# Patient Record
Sex: FEMALE | Race: WHITE | NOT HISPANIC OR LATINO
[De-identification: names, ages, dates, MRNs, and addresses within clinical notes are randomized per-mention and may not be internally consistent; named-entity substitution may affect disease eponyms.]

---

## 2021-08-23 PROBLEM — Z00.00 ENCOUNTER FOR PREVENTIVE HEALTH EXAMINATION: Status: ACTIVE | Noted: 2021-08-23

## 2021-11-02 ENCOUNTER — APPOINTMENT (OUTPATIENT)
Dept: UROLOGY | Facility: CLINIC | Age: 81
End: 2021-11-02
Payer: MEDICARE

## 2021-11-02 DIAGNOSIS — R32 UNSPECIFIED URINARY INCONTINENCE: ICD-10-CM

## 2021-11-02 DIAGNOSIS — R63.4 ABNORMAL WEIGHT LOSS: ICD-10-CM

## 2021-11-02 DIAGNOSIS — R15.9 FULL INCONTINENCE OF FECES: ICD-10-CM

## 2021-11-02 DIAGNOSIS — Z85.41 PERSONAL HISTORY OF MALIGNANT NEOPLASM OF CERVIX UTERI: ICD-10-CM

## 2021-11-02 DIAGNOSIS — R20.0 ANESTHESIA OF SKIN: ICD-10-CM

## 2021-11-02 PROCEDURE — 99203 OFFICE O/P NEW LOW 30 MIN: CPT

## 2021-11-02 NOTE — HISTORY OF PRESENT ILLNESS
[FreeTextEntry1] : Patient is a 81 year old whom presents for evaluation for Urinary incontinence. Patient reports 6 months ago she had full control of her urinary bladder and bowels. Patient reports that 6 months ago she had began to have urinary urgency and if she was unable to make get to bathroom quick enough she would leak urine. Patient reports that his has gotten progressively worse and now she leaks urine throughout the day. Patient reports using approximately 20 absorbant pads daily. \par Patient states that around the time the urinary leaking began she also started to have difficulty controlling her bowels. Patient states that when she feels she needs to defecate she leaks watery stool. Patient reports this has happened every day for the past months. \par Patient and family member report that she also started to get numbness in her lower extremities and worsening arthritis. Patient also reports loss of balance. Patient has also had over 20 lbs unintentional weight loss over the last 6 months. Patient does have a history of cervical cancer. \par \par Patient states that she feels this all began aftermoderna covid  Vaccination. \par \par Patient reports that she will be following up with a Primary Medical Doctor. \par \par Patient denies dysuria and gross hematuria. Denies flank pain.

## 2021-11-02 NOTE — END OF VISIT
[FreeTextEntry3] : I participated in obtaining history, performed a physical exam, discussed treatment plan with patient and her sisters and agree with the above transcription by the physicians assistant

## 2021-11-02 NOTE — PHYSICAL EXAM
[General Appearance - In No Acute Distress] : no acute distress [] : no respiratory distress [Abdomen Soft] : soft [Abdomen Tenderness] : non-tender [Urinary Bladder Findings] : the bladder was normal on palpation [Oriented To Time, Place, And Person] : oriented to person, place, and time [FreeTextEntry1] : Ambulates with asssistance from family members.

## 2021-11-02 NOTE — ASSESSMENT
[FreeTextEntry1] : 81 year old whom presents for evaluation for urinary incontinence, fecal incontinence, lower extremity numbness, and unintentional weight loss. \par Patient made aware of treatments for overactive bladder. Patient also made aware of possibility of overflow incontinence. Patient will get Urinary bladder US to assess bladder emptying. \par As for fecal and urinary incontinence, as we as loss of balance and lower extremity weakness, I recommend that patient follow up with Neurologist. Referral order placed. \par As for unintentional weight loss and history of cervical cancer, will obtain a CT scan of abdomen and pelvis. \par \par Plan\par -Patient to follow up 3-4 weeks to review above.

## 2021-12-02 ENCOUNTER — OUTPATIENT (OUTPATIENT)
Dept: OUTPATIENT SERVICES | Facility: HOSPITAL | Age: 81
LOS: 1 days | Discharge: HOME | End: 2021-12-02
Payer: MEDICARE

## 2021-12-02 ENCOUNTER — RESULT REVIEW (OUTPATIENT)
Age: 81
End: 2021-12-02

## 2021-12-02 DIAGNOSIS — R32 UNSPECIFIED URINARY INCONTINENCE: ICD-10-CM

## 2021-12-02 DIAGNOSIS — R20.0 ANESTHESIA OF SKIN: ICD-10-CM

## 2021-12-02 DIAGNOSIS — R63.4 ABNORMAL WEIGHT LOSS: ICD-10-CM

## 2021-12-02 PROCEDURE — 74176 CT ABD & PELVIS W/O CONTRAST: CPT | Mod: 26,MH

## 2021-12-02 PROCEDURE — 76857 US EXAM PELVIC LIMITED: CPT | Mod: 26

## 2021-12-07 ENCOUNTER — APPOINTMENT (OUTPATIENT)
Dept: UROLOGY | Facility: CLINIC | Age: 81
End: 2021-12-07

## 2021-12-10 ENCOUNTER — NON-APPOINTMENT (OUTPATIENT)
Age: 81
End: 2021-12-10

## 2022-01-05 ENCOUNTER — APPOINTMENT (OUTPATIENT)
Dept: UROLOGY | Facility: CLINIC | Age: 82
End: 2022-01-05

## 2023-01-21 ENCOUNTER — EMERGENCY (EMERGENCY)
Facility: HOSPITAL | Age: 83
LOS: 0 days | Discharge: HOME | End: 2023-01-21
Attending: EMERGENCY MEDICINE | Admitting: EMERGENCY MEDICINE
Payer: MEDICARE

## 2023-01-21 VITALS
SYSTOLIC BLOOD PRESSURE: 153 MMHG | RESPIRATION RATE: 18 BRPM | HEART RATE: 65 BPM | DIASTOLIC BLOOD PRESSURE: 69 MMHG | TEMPERATURE: 99 F | WEIGHT: 130.07 LBS | OXYGEN SATURATION: 99 %

## 2023-01-21 VITALS
SYSTOLIC BLOOD PRESSURE: 154 MMHG | DIASTOLIC BLOOD PRESSURE: 70 MMHG | HEART RATE: 69 BPM | OXYGEN SATURATION: 98 % | RESPIRATION RATE: 18 BRPM

## 2023-01-21 DIAGNOSIS — D72.829 ELEVATED WHITE BLOOD CELL COUNT, UNSPECIFIED: ICD-10-CM

## 2023-01-21 DIAGNOSIS — N39.0 URINARY TRACT INFECTION, SITE NOT SPECIFIED: ICD-10-CM

## 2023-01-21 DIAGNOSIS — Z85.41 PERSONAL HISTORY OF MALIGNANT NEOPLASM OF CERVIX UTERI: ICD-10-CM

## 2023-01-21 DIAGNOSIS — N93.9 ABNORMAL UTERINE AND VAGINAL BLEEDING, UNSPECIFIED: ICD-10-CM

## 2023-01-21 DIAGNOSIS — Z92.21 PERSONAL HISTORY OF ANTINEOPLASTIC CHEMOTHERAPY: ICD-10-CM

## 2023-01-21 DIAGNOSIS — Z92.3 PERSONAL HISTORY OF IRRADIATION: ICD-10-CM

## 2023-01-21 DIAGNOSIS — M19.90 UNSPECIFIED OSTEOARTHRITIS, UNSPECIFIED SITE: ICD-10-CM

## 2023-01-21 DIAGNOSIS — K64.9 UNSPECIFIED HEMORRHOIDS: ICD-10-CM

## 2023-01-21 DIAGNOSIS — R10.9 UNSPECIFIED ABDOMINAL PAIN: ICD-10-CM

## 2023-01-21 LAB
ALBUMIN SERPL ELPH-MCNC: 3.8 G/DL — SIGNIFICANT CHANGE UP (ref 3.5–5.2)
ALP SERPL-CCNC: 72 U/L — SIGNIFICANT CHANGE UP (ref 30–115)
ALT FLD-CCNC: 9 U/L — SIGNIFICANT CHANGE UP (ref 0–41)
ANION GAP SERPL CALC-SCNC: 11 MMOL/L — SIGNIFICANT CHANGE UP (ref 7–14)
APPEARANCE UR: ABNORMAL
AST SERPL-CCNC: 23 U/L — SIGNIFICANT CHANGE UP (ref 0–41)
BACTERIA # UR AUTO: NEGATIVE — SIGNIFICANT CHANGE UP
BASOPHILS # BLD AUTO: 0.16 K/UL — SIGNIFICANT CHANGE UP (ref 0–0.2)
BASOPHILS NFR BLD AUTO: 1 % — SIGNIFICANT CHANGE UP (ref 0–1)
BILIRUB DIRECT SERPL-MCNC: <0.2 MG/DL — SIGNIFICANT CHANGE UP (ref 0–0.3)
BILIRUB INDIRECT FLD-MCNC: >0.1 MG/DL — LOW (ref 0.2–1.2)
BILIRUB SERPL-MCNC: 0.3 MG/DL — SIGNIFICANT CHANGE UP (ref 0.2–1.2)
BILIRUB UR-MCNC: NEGATIVE — SIGNIFICANT CHANGE UP
BUN SERPL-MCNC: 20 MG/DL — SIGNIFICANT CHANGE UP (ref 10–20)
CALCIUM SERPL-MCNC: 9.2 MG/DL — SIGNIFICANT CHANGE UP (ref 8.4–10.5)
CHLORIDE SERPL-SCNC: 110 MMOL/L — SIGNIFICANT CHANGE UP (ref 98–110)
CO2 SERPL-SCNC: 20 MMOL/L — SIGNIFICANT CHANGE UP (ref 17–32)
COLOR SPEC: ABNORMAL
CREAT SERPL-MCNC: 0.9 MG/DL — SIGNIFICANT CHANGE UP (ref 0.7–1.5)
DIFF PNL FLD: ABNORMAL
EGFR: 65 ML/MIN/1.73M2 — SIGNIFICANT CHANGE UP
EOSINOPHIL # BLD AUTO: 0 K/UL — SIGNIFICANT CHANGE UP (ref 0–0.7)
EOSINOPHIL NFR BLD AUTO: 0 % — SIGNIFICANT CHANGE UP (ref 0–8)
EPI CELLS # UR: 0 /HPF — SIGNIFICANT CHANGE UP (ref 0–5)
GLUCOSE SERPL-MCNC: 85 MG/DL — SIGNIFICANT CHANGE UP (ref 70–99)
GLUCOSE UR QL: NEGATIVE — SIGNIFICANT CHANGE UP
HCT VFR BLD CALC: 36.4 % — LOW (ref 37–47)
HGB BLD-MCNC: 12.4 G/DL — SIGNIFICANT CHANGE UP (ref 12–16)
HYALINE CASTS # UR AUTO: 0 /LPF — SIGNIFICANT CHANGE UP (ref 0–7)
KETONES UR-MCNC: NEGATIVE — SIGNIFICANT CHANGE UP
LACTATE SERPL-SCNC: 0.8 MMOL/L — SIGNIFICANT CHANGE UP (ref 0.7–2)
LEUKOCYTE ESTERASE UR-ACNC: NEGATIVE — SIGNIFICANT CHANGE UP
LIDOCAIN IGE QN: 26 U/L — SIGNIFICANT CHANGE UP (ref 7–60)
LYMPHOCYTES # BLD AUTO: 24 % — SIGNIFICANT CHANGE UP (ref 20.5–51.1)
LYMPHOCYTES # BLD AUTO: 3.95 K/UL — HIGH (ref 1.2–3.4)
MACROCYTES BLD QL: SLIGHT — SIGNIFICANT CHANGE UP
MANUAL SMEAR VERIFICATION: SIGNIFICANT CHANGE UP
MCHC RBC-ENTMCNC: 31 PG — SIGNIFICANT CHANGE UP (ref 27–31)
MCHC RBC-ENTMCNC: 34.1 G/DL — SIGNIFICANT CHANGE UP (ref 32–37)
MCV RBC AUTO: 91 FL — SIGNIFICANT CHANGE UP (ref 81–99)
MONOCYTES # BLD AUTO: 0.16 K/UL — SIGNIFICANT CHANGE UP (ref 0.1–0.6)
MONOCYTES NFR BLD AUTO: 1 % — LOW (ref 1.7–9.3)
NEUTROPHILS # BLD AUTO: 11.52 K/UL — HIGH (ref 1.4–6.5)
NEUTROPHILS NFR BLD AUTO: 70 % — SIGNIFICANT CHANGE UP (ref 42.2–75.2)
NITRITE UR-MCNC: NEGATIVE — SIGNIFICANT CHANGE UP
NRBC # BLD: 1 /100 — HIGH (ref 0–0)
NRBC # BLD: SIGNIFICANT CHANGE UP /100 WBCS (ref 0–0)
PH UR: 7 — SIGNIFICANT CHANGE UP (ref 5–8)
PLAT MORPH BLD: NORMAL — SIGNIFICANT CHANGE UP
PLATELET # BLD AUTO: 100 K/UL — LOW (ref 130–400)
POTASSIUM SERPL-MCNC: 4.6 MMOL/L — SIGNIFICANT CHANGE UP (ref 3.5–5)
POTASSIUM SERPL-SCNC: 4.6 MMOL/L — SIGNIFICANT CHANGE UP (ref 3.5–5)
PROT SERPL-MCNC: 5.7 G/DL — LOW (ref 6–8)
PROT UR-MCNC: ABNORMAL
RBC # BLD: 4 M/UL — LOW (ref 4.2–5.4)
RBC # FLD: 13.4 % — SIGNIFICANT CHANGE UP (ref 11.5–14.5)
RBC BLD AUTO: ABNORMAL
RBC CASTS # UR COMP ASSIST: 135 /HPF — HIGH (ref 0–4)
SODIUM SERPL-SCNC: 141 MMOL/L — SIGNIFICANT CHANGE UP (ref 135–146)
SP GR SPEC: 1.01 — SIGNIFICANT CHANGE UP (ref 1.01–1.03)
UROBILINOGEN FLD QL: SIGNIFICANT CHANGE UP
VARIANT LYMPHS # BLD: 4 % — SIGNIFICANT CHANGE UP (ref 0–5)
WBC # BLD: 16.45 K/UL — HIGH (ref 4.8–10.8)
WBC # FLD AUTO: 16.45 K/UL — HIGH (ref 4.8–10.8)
WBC UR QL: 10 /HPF — HIGH (ref 0–5)

## 2023-01-21 PROCEDURE — 74177 CT ABD & PELVIS W/CONTRAST: CPT | Mod: 26,MA

## 2023-01-21 PROCEDURE — 76830 TRANSVAGINAL US NON-OB: CPT | Mod: 26

## 2023-01-21 PROCEDURE — 99284 EMERGENCY DEPT VISIT MOD MDM: CPT | Mod: FS

## 2023-01-21 RX ORDER — LEVOFLOXACIN 5 MG/ML
1 INJECTION, SOLUTION INTRAVENOUS
Qty: 7 | Refills: 0
Start: 2023-01-21 | End: 2023-01-27

## 2023-01-21 RX ORDER — SODIUM CHLORIDE 9 MG/ML
1000 INJECTION INTRAMUSCULAR; INTRAVENOUS; SUBCUTANEOUS ONCE
Refills: 0 | Status: COMPLETED | OUTPATIENT
Start: 2023-01-21 | End: 2023-01-21

## 2023-01-21 RX ORDER — DIPHENHYDRAMINE HCL 50 MG
25 CAPSULE ORAL ONCE
Refills: 0 | Status: COMPLETED | OUTPATIENT
Start: 2023-01-21 | End: 2023-01-21

## 2023-01-21 RX ADMIN — SODIUM CHLORIDE 1000 MILLILITER(S): 9 INJECTION INTRAMUSCULAR; INTRAVENOUS; SUBCUTANEOUS at 14:42

## 2023-01-21 RX ADMIN — Medication 25 MILLIGRAM(S): at 18:53

## 2023-01-21 RX ADMIN — Medication 1 TABLET(S): at 19:17

## 2023-01-21 NOTE — ED PROVIDER NOTE - CONSIDERATION OF ADMISSION OBSERVATION
Patient with hematuria, appears to have cystitis on CAT scan,  Has leukocytosis but no lactic acid.  Patient has no fever.  Patient appears well and nontoxic.  Patient and family preferring to go home with oral antibiotics.  Patient is advised to follow-up with urology, urogynecology outpatient.  Placed in referral program.  Labs and EKG were ordered and reviewed.  Imaging was ordered and reviewed by me.  Appropriate medications for patient's presenting complaints were ordered and effects were reassessed.  Patient's records (prior hospital, ED visit, and/or nursing home notes if available) were reviewed.  Additional history was obtained from EMS, family, and/or PCP (where available).  Escalation to admission/observation was considered.  1) However patient feels much better and is comfortable with discharge.  Appropriate follow-up was arranged.  Pt was given strict return to ED precautions and pt indicated that he/she understood. Consideration of Admission/Observation

## 2023-01-21 NOTE — ED PROVIDER NOTE - CLINICAL SUMMARY MEDICAL DECISION MAKING FREE TEXT BOX
Patient with hematuria, appears to have cystitis on CAT scan,  Has leukocytosis but no lactic acid.  Patient has no fever.  Patient appears well and nontoxic.  Patient and family preferring to go home with oral antibiotics.  Patient is advised to follow-up with urology, urogynecology outpatient.  Placed in referral program.  Labs and EKG were ordered and reviewed.  Imaging was ordered and reviewed by me.  Appropriate medications for patient's presenting complaints were ordered and effects were reassessed.  Patient's records (prior hospital, ED visit, and/or nursing home notes if available) were reviewed.  Additional history was obtained from EMS, family, and/or PCP (where available).  Escalation to admission/observation was considered.  1) However patient feels much better and is comfortable with discharge.  Appropriate follow-up was arranged.  Pt was given strict return to ED precautions and pt indicated that he/she understood.

## 2023-01-21 NOTE — ED PROVIDER NOTE - CARE PROVIDER_API CALL
Savannah Gutierrez)  Female Pelvic [facility] S[facility]; Obstetrics and Gynecology  [address] [place] [address]  Phone: [phone]  Fax: [phone]  Follow Up Time:

## 2023-01-21 NOTE — ED PROVIDER NOTE - ATTENDING APP SHARED VISIT CONTRIBUTION OF CARE
82-year-old female with past medical history of cervical cancer, in remission, presents with complaints of vaginal bleeding for 2 to 3 days.  Patient also having left flank pain.  Patient admits she has been having some urinary incontinence, previously mild but more severe in the last 2 to 3 days.  Family says she almost cannot hold her urine.  Denies any fever or chills.  Patient denies vomiting or diarrhea.  Patient has seen Dr. Herrera in the past but family does not know what kind of testing she has had.  Exam: nad, ncat, perrl, eomi, mmm, rrr, ctab, abd soft, nt, nd aox3, pelvic and HEATH as per PA: No vaginal bleeding, negative HEATH, positive hemorrhoid impression: Patient with bleeding when urinating, no evidence of vaginal or rectal bleeding likely hematuria, ruled out UA, CT and ultrasound

## 2023-01-21 NOTE — ED PROVIDER NOTE - PATIENT PORTAL LINK FT
You can access the FollowMyHealth Patient Portal offered by [facility] by registering at the following website: [email or web address]. By joining [employer]’s FollowMyHealth portal, you will also be able to view your health information using other applications (apps) compatible with our system. You can access the FollowMyHealth Patient Portal offered by [facility] by registering at the following website: [email or web address]. By joining "[employer]"’s FollowMyHealth portal, you will also be able to view your health information using other applications (apps) compatible with our system.

## 2023-01-21 NOTE — ED PROVIDER NOTE - OBJECTIVE STATEMENT
82-year-old female with history of cervical CA status postchemotherapy, radiation, arthritis presents to the ED complaining of vaginal bleeding for 2 to 3 days and some left flank pain.  Patient denies any abdominal pain, nausea, vomiting, diarrhea, fever, chills or urinary symptoms.

## 2023-01-21 NOTE — ED PROVIDER NOTE - NS ED ATTENDING STATEMENT MOD
This was a shared visit with the MARLENI. I reviewed and verified the documentation and independently performed the documented:

## 2023-01-21 NOTE — ED PROVIDER NOTE - PROGRESS NOTE DETAILS
pt ordered levaquin, started to itch.  stopped and given benadryl pt ordered levaquin, started to itch.  stopped and given benadryl  no OP swelling, no wheezing, no sob, no dizziness pt offered option of admission v. trial of bactrim at home.  due to c/o of flank pain, unable to trial macrobid as poor renal coverage.  trial oral bactrim in ED to monitor for allergic reaction.  pt had no reaction.  will dc with bactrim.

## 2023-01-21 NOTE — ED ADULT NURSE NOTE - OBJECTIVE STATEMENT
pt presents to ED c/o vaginal bleeding since yesterday, pt reporting some small clots. pt associating bleeding with mild abdominal cramping  also reports urinary and bowel incontinence  denies anti coag use

## 2023-01-21 NOTE — ED PROVIDER NOTE - CARE PLAN
1 Principal Discharge DX:	Urinary tract infection with hematuria  Secondary Diagnosis:	Leukocytosis

## 2023-01-21 NOTE — ED PROVIDER NOTE - NS ED ROS FT
Constitutional: (-) fever  Eyes/ENT: (-) blurry vision, (-) epistaxis  Cardiovascular: (-) chest pain, (-) syncope  Respiratory: (-) cough, (-) shortness of breath  Gastrointestinal: (-) vomiting, (-) diarrhea  : (+)vaginal bleeding  Musculoskeletal: (-) neck pain, (-) back pain, (-) joint pain  Integumentary: (-) rash, (-) edema  Neurological: (-) headache, (-) altered mental status  Psychiatric: (-) hallucinations

## 2023-01-21 NOTE — ED PROVIDER NOTE - PHYSICAL EXAMINATION
Vital Signs: I have reviewed the initial vital signs.  Constitutional: NAD, well-nourished, appears stated age, no acute distress.  HEENT: Airway patent, moist MM, no erythema/swelling/deformity of oral structures. EOMI, PERRLA.  CV: regular rate, regular rhythm, well-perfused extremities, 2+ b/l DP and radial pulses equal.  Lungs: BCTA, no increased WOB.  ABD: NT, ND, no guarding or rebound, no pulsatile mass, no hernias.  : no active bleeding   Rectal: +hemmorhoid; no bleeding  MSK: Neck supple, nontender, nl ROM, no stepoff. Chest nontender. Back nontender. Ext nontender, nl rom, no deformity.   INTEG: Skin warm, dry, no rash.  NEURO: A&Ox3, normal strength, nl sensation throughout, normal speech.   PSYCH: Calm, cooperative, normal affect and interaction.

## 2023-01-21 NOTE — ED ADULT TRIAGE NOTE - CHIEF COMPLAINT QUOTE
pt presents to ED c/o vaginal bleeding since yesterday, pt reporting some small clots. pt associating bleeding with mild abdominal cramping

## 2025-01-12 ENCOUNTER — INPATIENT (INPATIENT)
Facility: HOSPITAL | Age: 85
LOS: 9 days | Discharge: HOME | DRG: 195 | End: 2025-01-22
Attending: STUDENT IN AN ORGANIZED HEALTH CARE EDUCATION/TRAINING PROGRAM | Admitting: FAMILY MEDICINE
Payer: MEDICARE

## 2025-01-12 VITALS
HEART RATE: 88 BPM | SYSTOLIC BLOOD PRESSURE: 146 MMHG | RESPIRATION RATE: 18 BRPM | TEMPERATURE: 99 F | OXYGEN SATURATION: 93 % | DIASTOLIC BLOOD PRESSURE: 71 MMHG

## 2025-01-12 DIAGNOSIS — Z88.0 ALLERGY STATUS TO PENICILLIN: ICD-10-CM

## 2025-01-12 DIAGNOSIS — C91.10 CHRONIC LYMPHOCYTIC LEUKEMIA OF B-CELL TYPE NOT HAVING ACHIEVED REMISSION: ICD-10-CM

## 2025-01-12 DIAGNOSIS — I50.32 CHRONIC DIASTOLIC (CONGESTIVE) HEART FAILURE: ICD-10-CM

## 2025-01-12 DIAGNOSIS — I35.0 NONRHEUMATIC AORTIC (VALVE) STENOSIS: ICD-10-CM

## 2025-01-12 DIAGNOSIS — Z88.8 ALLERGY STATUS TO OTHER DRUGS, MEDICAMENTS AND BIOLOGICAL SUBSTANCES: ICD-10-CM

## 2025-01-12 DIAGNOSIS — I27.20 PULMONARY HYPERTENSION, UNSPECIFIED: ICD-10-CM

## 2025-01-12 DIAGNOSIS — J10.1 INFLUENZA DUE TO OTHER IDENTIFIED INFLUENZA VIRUS WITH OTHER RESPIRATORY MANIFESTATIONS: ICD-10-CM

## 2025-01-12 DIAGNOSIS — E43 UNSPECIFIED SEVERE PROTEIN-CALORIE MALNUTRITION: ICD-10-CM

## 2025-01-12 DIAGNOSIS — N13.6 PYONEPHROSIS: ICD-10-CM

## 2025-01-12 LAB
ALBUMIN SERPL ELPH-MCNC: 3.6 G/DL — SIGNIFICANT CHANGE UP (ref 3.5–5.2)
ALP SERPL-CCNC: 72 U/L — SIGNIFICANT CHANGE UP (ref 30–115)
ALT FLD-CCNC: 10 U/L — SIGNIFICANT CHANGE UP (ref 0–41)
ANION GAP SERPL CALC-SCNC: 12 MMOL/L — SIGNIFICANT CHANGE UP (ref 7–14)
APPEARANCE UR: CLEAR — SIGNIFICANT CHANGE UP
APTT BLD: 30.2 SEC — SIGNIFICANT CHANGE UP (ref 27–39.2)
AST SERPL-CCNC: 32 U/L — SIGNIFICANT CHANGE UP (ref 0–41)
BACTERIA # UR AUTO: ABNORMAL /HPF
BASOPHILS # BLD AUTO: 0 K/UL — SIGNIFICANT CHANGE UP (ref 0–0.2)
BASOPHILS NFR BLD AUTO: 0 % — SIGNIFICANT CHANGE UP (ref 0–1)
BILIRUB SERPL-MCNC: 0.7 MG/DL — SIGNIFICANT CHANGE UP (ref 0.2–1.2)
BILIRUB UR-MCNC: NEGATIVE — SIGNIFICANT CHANGE UP
BUN SERPL-MCNC: 15 MG/DL — SIGNIFICANT CHANGE UP (ref 10–20)
CALCIUM SERPL-MCNC: 9.1 MG/DL — SIGNIFICANT CHANGE UP (ref 8.4–10.5)
CHLORIDE SERPL-SCNC: 106 MMOL/L — SIGNIFICANT CHANGE UP (ref 98–110)
CK SERPL-CCNC: 1053 U/L — HIGH (ref 0–225)
CO2 SERPL-SCNC: 23 MMOL/L — SIGNIFICANT CHANGE UP (ref 17–32)
COLOR SPEC: YELLOW — SIGNIFICANT CHANGE UP
CREAT SERPL-MCNC: 1 MG/DL — SIGNIFICANT CHANGE UP (ref 0.7–1.5)
DIFF PNL FLD: ABNORMAL
EGFR: 56 ML/MIN/1.73M2 — LOW
EOSINOPHIL NFR BLD AUTO: 0 % — SIGNIFICANT CHANGE UP (ref 0–8)
EPI CELLS # UR: PRESENT
FLUAV AG NPH QL: DETECTED
FLUBV AG NPH QL: SIGNIFICANT CHANGE UP
GLUCOSE SERPL-MCNC: 111 MG/DL — HIGH (ref 70–99)
GLUCOSE UR QL: NEGATIVE MG/DL — SIGNIFICANT CHANGE UP
HCT VFR BLD CALC: 34.2 % — LOW (ref 37–47)
HGB BLD-MCNC: 11.5 G/DL — LOW (ref 12–16)
INR BLD: 1.23 RATIO — SIGNIFICANT CHANGE UP (ref 0.65–1.3)
KETONES UR-MCNC: NEGATIVE MG/DL — SIGNIFICANT CHANGE UP
LACTATE SERPL-SCNC: 0.9 MMOL/L — SIGNIFICANT CHANGE UP (ref 0.7–2)
LEUKOCYTE ESTERASE UR-ACNC: ABNORMAL
LYMPHOCYTES # BLD AUTO: 30.39 K/UL — HIGH (ref 1.2–3.4)
LYMPHOCYTES # BLD AUTO: 69 % — HIGH (ref 20.5–51.1)
MAGNESIUM SERPL-MCNC: 1.5 MG/DL — LOW (ref 1.8–2.4)
MANUAL DIF COMMENT BLD-IMP: SIGNIFICANT CHANGE UP
MANUAL SMEAR VERIFICATION: SIGNIFICANT CHANGE UP
MCHC RBC-ENTMCNC: 30.4 PG — SIGNIFICANT CHANGE UP (ref 27–31)
MCHC RBC-ENTMCNC: 33.6 G/DL — SIGNIFICANT CHANGE UP (ref 32–37)
MCV RBC AUTO: 90.5 FL — SIGNIFICANT CHANGE UP (ref 81–99)
MONOCYTES # BLD AUTO: 0.44 K/UL — SIGNIFICANT CHANGE UP (ref 0.1–0.6)
MONOCYTES NFR BLD AUTO: 1 % — LOW (ref 1.7–9.3)
NEUTROPHILS # BLD AUTO: 6.61 K/UL — HIGH (ref 1.4–6.5)
NEUTROPHILS NFR BLD AUTO: 15 % — LOW (ref 42.2–75.2)
NITRITE UR-MCNC: NEGATIVE — SIGNIFICANT CHANGE UP
NRBC # BLD: 0 /100 WBCS — SIGNIFICANT CHANGE UP (ref 0–0)
NRBC # BLD: SIGNIFICANT CHANGE UP /100 WBCS (ref 0–0)
NRBC BLD-RTO: 0 /100 WBCS — SIGNIFICANT CHANGE UP (ref 0–0)
NRBC BLD-RTO: SIGNIFICANT CHANGE UP /100 WBCS (ref 0–0)
NT-PROBNP SERPL-SCNC: 2624 PG/ML — HIGH (ref 0–300)
PH UR: 6.5 — SIGNIFICANT CHANGE UP (ref 5–8)
PLAT MORPH BLD: NORMAL — SIGNIFICANT CHANGE UP
PLATELET # BLD AUTO: 113 K/UL — LOW (ref 130–400)
PMV BLD: 10.6 FL — HIGH (ref 7.4–10.4)
POTASSIUM SERPL-MCNC: 3.5 MMOL/L — SIGNIFICANT CHANGE UP (ref 3.5–5)
POTASSIUM SERPL-SCNC: 3.5 MMOL/L — SIGNIFICANT CHANGE UP (ref 3.5–5)
PROT SERPL-MCNC: 5.6 G/DL — LOW (ref 6–8)
PROT UR-MCNC: 100 MG/DL
PROTHROM AB SERPL-ACNC: 14.6 SEC — HIGH (ref 9.95–12.87)
RBC # BLD: 3.78 M/UL — LOW (ref 4.2–5.4)
RBC # FLD: 13.5 % — SIGNIFICANT CHANGE UP (ref 11.5–14.5)
RBC BLD AUTO: NORMAL — SIGNIFICANT CHANGE UP
RBC CASTS # UR COMP ASSIST: 50 /HPF — HIGH (ref 0–4)
RSV RNA NPH QL NAA+NON-PROBE: SIGNIFICANT CHANGE UP
SARS-COV-2 RNA SPEC QL NAA+PROBE: SIGNIFICANT CHANGE UP
SMUDGE CELLS # BLD: PRESENT — SIGNIFICANT CHANGE UP
SODIUM SERPL-SCNC: 141 MMOL/L — SIGNIFICANT CHANGE UP (ref 135–146)
SP GR SPEC: 1.02 — SIGNIFICANT CHANGE UP (ref 1–1.03)
SQUAMOUS # UR AUTO: 5 /HPF — SIGNIFICANT CHANGE UP (ref 0–5)
UROBILINOGEN FLD QL: 0.2 MG/DL — SIGNIFICANT CHANGE UP (ref 0.2–1)
VARIANT LYMPHS # BLD: 15 % — HIGH (ref 0–5)
VARIANT LYMPHS NFR BLD MANUAL: 15 % — HIGH (ref 0–5)
WBC # BLD: 44.05 K/UL — CRITICAL HIGH (ref 4.8–10.8)
WBC # FLD AUTO: 44.05 K/UL — CRITICAL HIGH (ref 4.8–10.8)
WBC UR QL: 20 /HPF — HIGH (ref 0–5)

## 2025-01-12 PROCEDURE — 74177 CT ABD & PELVIS W/CONTRAST: CPT | Mod: MC

## 2025-01-12 PROCEDURE — 88237 TISSUE CULTURE BONE MARROW: CPT

## 2025-01-12 PROCEDURE — 88264 CHROMOSOME ANALYSIS 20-25: CPT

## 2025-01-12 PROCEDURE — 88275 CYTOGENETICS 100-300: CPT

## 2025-01-12 PROCEDURE — 82550 ASSAY OF CK (CPK): CPT

## 2025-01-12 PROCEDURE — 93970 EXTREMITY STUDY: CPT

## 2025-01-12 PROCEDURE — 99285 EMERGENCY DEPT VISIT HI MDM: CPT | Mod: FS

## 2025-01-12 PROCEDURE — 72125 CT NECK SPINE W/O DYE: CPT | Mod: 26

## 2025-01-12 PROCEDURE — 83880 ASSAY OF NATRIURETIC PEPTIDE: CPT

## 2025-01-12 PROCEDURE — 93306 TTE W/DOPPLER COMPLETE: CPT

## 2025-01-12 PROCEDURE — 81001 URINALYSIS AUTO W/SCOPE: CPT

## 2025-01-12 PROCEDURE — 70450 CT HEAD/BRAIN W/O DYE: CPT | Mod: 26

## 2025-01-12 PROCEDURE — 85025 COMPLETE CBC W/AUTO DIFF WBC: CPT

## 2025-01-12 PROCEDURE — 70450 CT HEAD/BRAIN W/O DYE: CPT | Mod: MC

## 2025-01-12 PROCEDURE — 86901 BLOOD TYPING SEROLOGIC RH(D): CPT

## 2025-01-12 PROCEDURE — 88185 FLOWCYTOMETRY/TC ADD-ON: CPT

## 2025-01-12 PROCEDURE — 87449 NOS EACH ORGANISM AG IA: CPT

## 2025-01-12 PROCEDURE — 88280 CHROMOSOME KARYOTYPE STUDY: CPT

## 2025-01-12 PROCEDURE — 80053 COMPREHEN METABOLIC PANEL: CPT

## 2025-01-12 PROCEDURE — 84100 ASSAY OF PHOSPHORUS: CPT

## 2025-01-12 PROCEDURE — 76770 US EXAM ABDO BACK WALL COMP: CPT

## 2025-01-12 PROCEDURE — 86900 BLOOD TYPING SEROLOGIC ABO: CPT

## 2025-01-12 PROCEDURE — 88271 CYTOGENETICS DNA PROBE: CPT

## 2025-01-12 PROCEDURE — 85027 COMPLETE CBC AUTOMATED: CPT

## 2025-01-12 PROCEDURE — 87205 SMEAR GRAM STAIN: CPT

## 2025-01-12 PROCEDURE — 99223 1ST HOSP IP/OBS HIGH 75: CPT | Mod: FS

## 2025-01-12 PROCEDURE — 36415 COLL VENOUS BLD VENIPUNCTURE: CPT

## 2025-01-12 PROCEDURE — 88285 CHROMOSOME COUNT ADDITIONAL: CPT

## 2025-01-12 PROCEDURE — 80048 BASIC METABOLIC PNL TOTAL CA: CPT

## 2025-01-12 PROCEDURE — 93925 LOWER EXTREMITY STUDY: CPT

## 2025-01-12 PROCEDURE — 72170 X-RAY EXAM OF PELVIS: CPT

## 2025-01-12 PROCEDURE — 88184 FLOWCYTOMETRY/ TC 1 MARKER: CPT

## 2025-01-12 PROCEDURE — 87507 IADNA-DNA/RNA PROBE TQ 12-25: CPT

## 2025-01-12 PROCEDURE — 83735 ASSAY OF MAGNESIUM: CPT

## 2025-01-12 PROCEDURE — 97162 PT EVAL MOD COMPLEX 30 MIN: CPT | Mod: GP

## 2025-01-12 PROCEDURE — 71045 X-RAY EXAM CHEST 1 VIEW: CPT | Mod: 26

## 2025-01-12 PROCEDURE — 87324 CLOSTRIDIUM AG IA: CPT

## 2025-01-12 PROCEDURE — 86850 RBC ANTIBODY SCREEN: CPT

## 2025-01-12 RX ORDER — ONDANSETRON 4 MG/1
4 TABLET, ORALLY DISINTEGRATING ORAL EVERY 8 HOURS
Refills: 0 | Status: DISCONTINUED | OUTPATIENT
Start: 2025-01-12 | End: 2025-01-22

## 2025-01-12 RX ORDER — MAGNESIUM, ALUMINUM HYDROXIDE 200-225/5
30 SUSPENSION, ORAL (FINAL DOSE FORM) ORAL EVERY 4 HOURS
Refills: 0 | Status: DISCONTINUED | OUTPATIENT
Start: 2025-01-12 | End: 2025-01-22

## 2025-01-12 RX ORDER — ACETAMINOPHEN 160 MG/5ML
650 SUSPENSION ORAL EVERY 6 HOURS
Refills: 0 | Status: DISCONTINUED | OUTPATIENT
Start: 2025-01-12 | End: 2025-01-22

## 2025-01-12 RX ORDER — OSELTAMIVIR PHOSPHATE 75 MG/1
75 CAPSULE ORAL
Refills: 0 | Status: DISCONTINUED | OUTPATIENT
Start: 2025-01-12 | End: 2025-01-16

## 2025-01-12 RX ORDER — AZTREONAM 500 MG
2000 VIAL (EA) INJECTION EVERY 8 HOURS
Refills: 0 | Status: COMPLETED | OUTPATIENT
Start: 2025-01-12 | End: 2025-01-15

## 2025-01-12 RX ORDER — ACETAMINOPHEN, DIPHENHYDRAMINE HCL, PHENYLEPHRINE HCL 325; 25; 5 MG/1; MG/1; MG/1
3 TABLET ORAL AT BEDTIME
Refills: 0 | Status: DISCONTINUED | OUTPATIENT
Start: 2025-01-12 | End: 2025-01-22

## 2025-01-12 RX ORDER — ACETAMINOPHEN 160 MG/5ML
975 SUSPENSION ORAL ONCE
Refills: 0 | Status: COMPLETED | OUTPATIENT
Start: 2025-01-12 | End: 2025-01-12

## 2025-01-12 RX ORDER — ENOXAPARIN SODIUM 100 MG/ML
40 INJECTION SUBCUTANEOUS EVERY 24 HOURS
Refills: 0 | Status: DISCONTINUED | OUTPATIENT
Start: 2025-01-12 | End: 2025-01-22

## 2025-01-12 RX ORDER — BACTERIOSTATIC SODIUM CHLORIDE 0.9 %
1000 VIAL (ML) INJECTION ONCE
Refills: 0 | Status: COMPLETED | OUTPATIENT
Start: 2025-01-12 | End: 2025-01-12

## 2025-01-12 RX ORDER — MAGNESIUM SULFATE 0.8 MEQ/ML
1 AMPUL (ML) INJECTION ONCE
Refills: 0 | Status: COMPLETED | OUTPATIENT
Start: 2025-01-12 | End: 2025-01-12

## 2025-01-12 RX ADMIN — ACETAMINOPHEN 975 MILLIGRAM(S): 160 SUSPENSION ORAL at 17:55

## 2025-01-12 RX ADMIN — Medication 25 GRAM(S): at 19:05

## 2025-01-12 RX ADMIN — Medication 1000 MILLILITER(S): at 17:55

## 2025-01-12 RX ADMIN — ENOXAPARIN SODIUM 40 MILLIGRAM(S): 100 INJECTION SUBCUTANEOUS at 23:59

## 2025-01-12 NOTE — H&P ADULT - HISTORY OF PRESENT ILLNESS
Patient is an 84 year old female with a history of cervical cancer who presents to the Ed after being found on floor by family today. Patient reports increased weakness and lethargy since yesterday. She fell, hit her head, and could not get up, but does not remember what time she fell. Her sisters found her on the floor this morning. Last known contact with patient was last night. Patient states that she has had a cough and congestion for the past week. She also refers to urinary incontinence and burning sensation with urination. Patient is noncompliant with medical care. She denies chest pain, SOB, fever, chills, dizziness, nausea, vomiting, diarrhea, and abdominal pain. Patient is an 84 year old female with a history of cervical cancer who presents to the ED after being found on floor by family today. Patient reports increased weakness and lethargy since yesterday. She fell, hit her head, and could not get up, but does not remember what time she fell. Her sisters found her on the floor this morning. Last known contact with patient was last night. Patient states that she has had a cough and congestion for the past week. Patient lives alone and walks with walker. She also refers to urinary incontinence and burning sensation with urination. Patient is noncompliant with medical care and has not seen a doctor in several years. She denies chest pain, SOB, fever, chills, dizziness, nausea, vomiting and abdominal pain. Patient now with diarrhea since receiving magnesium supplementation.

## 2025-01-12 NOTE — H&P ADULT - NSHPLABSRESULTS_GEN_ALL_CORE
11.5   44.05 )-----------( 113      ( 12 Jan 2025 17:15 )             34.2 11.5   44.05 )-----------( 113      ( 2025 17:15 )             34.2           141  |  106  |  15  ----------------------------<  111[H]  3.5   |  23  |  1.0    Ca    9.1      2025 17:15  Mg     1.5         TPro  5.6[L]  /  Alb  3.6  /  TBili  0.7  /  DBili  x   /  AST  32  /  ALT  10  /  AlkPhos  72            Magnesium: 1.5 mg/dL (25 @ 17:15)          Urinalysis Basic - ( 2025 20:41 )    Color: Yellow / Appearance: Clear / S.017 / pH: x  Gluc: x / Ketone: Negative mg/dL  / Bili: Negative / Urobili: 0.2 mg/dL   Blood: x / Protein: 100 mg/dL / Nitrite: Negative   Leuk Esterase: Small / RBC: 50 /HPF / WBC 20 /HPF   Sq Epi: x / Non Sq Epi: 5 /HPF / Bacteria: Many /HPF        PT/INR - ( 2025 17:15 )   PT: 14.60 sec;   INR: 1.23 ratio         PTT - ( 2025 17:15 )  PTT:30.2 sec    Lactate Trend   @ 17:15 Lactate:0.9       < from: CT Cervical Spine No Cont (25 @ 17:40) >      CT HEAD:  1.No evidence of acute intracranial pathology.    CT CERVICAL SPINE:  1.  No evidence of acute cervical spine traumatic injury.      < end of copied text >

## 2025-01-12 NOTE — ED PROVIDER NOTE - PHYSICAL EXAMINATION
Vital Signs: I have reviewed the initial vital signs.  Constitutional: thin , frail   Eyes: PERRLA, EOMI,  clear conjunctiva  ENT: MMM,   Cardiovascular: regular rate, regular rhythm, no murmur appreciated  Respiratory: unlabored respiratory effort, wheezing b/l   Gastrointestinal: soft, non-tender, non-distended  abdomen, no pulsatile mass  Musculoskeletal: supple neck, no lower extremity edema, no bony tenderness, no cervical tenderness, +pelvis stable   Integumentary: warm, dry, no rash  Neurologic: awake, alert, cranial nerves II-XII grossly intact, extremities’ motor and sensory functions grossly intact, no focal deficits

## 2025-01-12 NOTE — ED PROVIDER NOTE - ATTENDING APP SHARED VISIT CONTRIBUTION OF CARE
I personally evaluated the patient. I reviewed the Resident's or Physician Assistant's note (as assigned above), and agree with the findings and plan except as documented in my note.    84-year-old female presents to the ED for weakness lethargy and cough found at home on the floor by family.  Prior history of cervical cancer.    GENERAL: female in no distress.   HEENT: EOMI mucosa moist  CHEST: normal work of breathing noted  CV: pulses intact   EXTR: FROM  Abdomen: Soft, no rebound  NEURO: AAO 3 no focal deficits  SKIN: normal no pallor   PSYCH: normal mood & mentation      Impression: Syncope    Plan: IV, labs, imaging, supportive care & reevaluation

## 2025-01-12 NOTE — ED ADULT TRIAGE NOTE - CHIEF COMPLAINT QUOTE
[facility] states " Pt family called they found her on the floor. She has increase weakness and unable to hold urine over the past two weeks"

## 2025-01-12 NOTE — H&P ADULT - ASSESSMENT
Assessment:    Patient is an 84 year old female with a history of cervical cancer who presents to the Ed after being found on floor by family today.     Plan:    #Influenza A  - Tamiflu  - Isolation precautions    #UTI  - Follow up urine cultures  - Follow up blood cultures 84 year old female with a history of cervical cancer presenting for lethargy and fall found on floor by family.     Plan:  #severe leukocytosis 44k  #Influenza A  #uti  - Tamiflu x5 days   - Isolation precautions  - Follow up urine cultures  - Follow up blood cultures  - azactam   - f/u AM cbc, if still elevated, consider hematology eval concerning underlying malignancy     #b/l lower extremity edema   - check echo  - BNP  - US LE     #elevated ck level  - s/p 1L in ER  - encourage po hydration  - caution IV fluid until echo and BNP resulted    84 year old female with a history of cervical cancer presenting for lethargy and fall found on floor by family.     Plan:  #severe leukocytosis 44k  #Influenza A  #uti  - Tamiflu x5 days   - Isolation precautions  - Follow up urine cultures  - Follow up blood cultures  - azactam   - f/u AM cbc, if still elevated, consider hematology eval concerning underlying malignancy     #b/l lower extremity edema   - check echo  - BNP  - US LE     #elevated ck level  - s/p 1L in ER  - encourage po hydration  - caution IV fluid until echo and BNP resulted     #fall   - pt eval   - fall risk protocol   - ct head and neck negative    84 year old female with a history of cervical cancer presenting for lethargy and fall found on floor by family.     Plan:  #severe leukocytosis 44k  #Influenza A  #uti  - Tamiflu x5 days   - Isolation precautions  - Follow up urine cultures  - Follow up blood cultures  - azactam   - f/u AM cbc, if still elevated, consider hematology eval concerning underlying malignancy     #b/l lower extremity edema   - check echo  - BNP  - US LE     #elevated ck level  - s/p 1L in ER  - encourage po hydration  - caution IV fluid until echo and BNP resulted     #fall   - pt eval   - fall risk protocol   - ct head and neck negative     #Progress Note Handoff  Pending (specify):  as above   Family discussion:  plan of care was discussed with patient and family in details.  all questions were answered.  seems to understand, and in agreement  Disposition:  PT

## 2025-01-12 NOTE — H&P ADULT - NSHPPHYSICALEXAM_GEN_ALL_CORE
T(C): 37.4 (01-12-25 @ 16:18), Max: 37.4 (01-12-25 @ 16:18)  HR: 88 (01-12-25 @ 16:18) (88 - 88)  BP: 146/71 (01-12-25 @ 16:18) (146/71 - 146/71)  RR: 18 (01-12-25 @ 16:18) (18 - 18)  SpO2: 93% (01-12-25 @ 16:18) (93% - 93%)    CONSTITUTIONAL: Well groomed, no apparent distress  EYES: PERRLA and symmetric, EOMI, No conjunctival or scleral injection, non-icteric  ENMT: Oral mucosa with moist membranes. Normal dentition; no pharyngeal injection or exudates             NECK: Supple, symmetric and without tracheal deviation   RESP: No respiratory distress, no use of accessory muscles; wheezing and mild rhonchi in b/l lower lungs  CV: RRR, +S1S2, no MRG; no JVD; 2+ pitting edema in bilaterally in lower extremities  GI: Soft, NT, ND, no rebound, no guarding; no palpable masses; no hepatosplenomegaly; no hernia palpated  LYMPH: No cervical LAD or tenderness; no axillary LAD or tenderness; no inguinal LAD or tenderness  MSK: Normal gait; No digital clubbing or cyanosis; examination of the (head/neck/spine/ribs/pelvis, RUE, LUE, RLE, LLE) without misalignment,            Normal ROM without pain, no spinal tenderness, normal muscle strength/tone  SKIN: No rashes or ulcers noted; no subcutaneous nodules or induration palpable  NEURO: CN II-XII intact; normal reflexes in upper and lower extremities, sensation intact in upper and lower extremities b/l to light touch   PSYCH: Appropriate insight/judgment; A+O x 3, mood and affect appropriate, recent/remote memory intact VITALS:   T(C): 37.4 (01-12-25 @ 16:18), Max: 37.4 (01-12-25 @ 16:18)  HR: 88 (01-12-25 @ 16:18) (88 - 88)  BP: 146/71 (01-12-25 @ 16:18) (146/71 - 146/71)  RR: 18 (01-12-25 @ 16:18) (18 - 18)  SpO2: 93% (01-12-25 @ 16:18) (93% - 93%)    GENERAL: NAD, lying in bed comfortably  HEAD:  Atraumatic, normocephalic  EYES: EOMI, PERRLA, conjunctiva and sclera clear  ENT: Moist mucous membranes  HEART: Regular rate and rhythm,   LUNGS: Unlabored respirations.  + wheeze RLL, b/l rhonchi   ABDOMEN: Soft, nontender, nondistended,  EXTREMITIES:  bilateral 2+ pitting edema   NERVOUS SYSTEM:  A&Ox3  SKIN: No rashes or lesions

## 2025-01-12 NOTE — ED PROVIDER NOTE - OBJECTIVE STATEMENT
83 y/o female with hx of cervical cancer in past with ratx , lives alone , non complaint with any medications or medical appts presents to the Ed after being found on floor by family today. patient with increased weakness and lethargy yesterday. no vomiting or diarrhea. patient with cough and congestion. no abdominal pain . patient found on floor by family today , incontinent of foul smelling urine. no leg pain , neck or back pain.

## 2025-01-12 NOTE — ED PROVIDER NOTE - CLINICAL SUMMARY MEDICAL DECISION MAKING FREE TEXT BOX
84-year-old female presents to the ED for syncope from home.  In the emergency department had screening exam, labs and imaging, found to be flu positive.  Will treat with supportive care, IV fluids and admit to inpatient setting for continued management.

## 2025-01-12 NOTE — PATIENT PROFILE ADULT - FALL HARM RISK - RISK INTERVENTIONS

## 2025-01-13 ENCOUNTER — RESULT REVIEW (OUTPATIENT)
Age: 85
End: 2025-01-13

## 2025-01-13 PROBLEM — M19.90 UNSPECIFIED OSTEOARTHRITIS, UNSPECIFIED SITE: Chronic | Status: ACTIVE | Noted: 2023-01-21

## 2025-01-13 PROBLEM — C53.9 MALIGNANT NEOPLASM OF CERVIX UTERI, UNSPECIFIED: Chronic | Status: ACTIVE | Noted: 2023-01-21

## 2025-01-13 LAB
ANION GAP SERPL CALC-SCNC: 13 MMOL/L — SIGNIFICANT CHANGE UP (ref 7–14)
BASOPHILS # BLD AUTO: 0.02 K/UL — SIGNIFICANT CHANGE UP (ref 0–0.2)
BASOPHILS NFR BLD AUTO: 0.1 % — SIGNIFICANT CHANGE UP (ref 0–1)
BUN SERPL-MCNC: 14 MG/DL — SIGNIFICANT CHANGE UP (ref 10–20)
CALCIUM SERPL-MCNC: 8.8 MG/DL — SIGNIFICANT CHANGE UP (ref 8.4–10.5)
CHLORIDE SERPL-SCNC: 106 MMOL/L — SIGNIFICANT CHANGE UP (ref 98–110)
CO2 SERPL-SCNC: 23 MMOL/L — SIGNIFICANT CHANGE UP (ref 17–32)
CREAT SERPL-MCNC: 0.9 MG/DL — SIGNIFICANT CHANGE UP (ref 0.7–1.5)
EGFR: 64 ML/MIN/1.73M2 — SIGNIFICANT CHANGE UP
EOSINOPHIL # BLD AUTO: 0.02 K/UL — SIGNIFICANT CHANGE UP (ref 0–0.7)
EOSINOPHIL NFR BLD AUTO: 0.1 % — SIGNIFICANT CHANGE UP (ref 0–8)
GLUCOSE SERPL-MCNC: 92 MG/DL — SIGNIFICANT CHANGE UP (ref 70–99)
HCT VFR BLD CALC: 34.5 % — LOW (ref 37–47)
HGB BLD-MCNC: 11.5 G/DL — LOW (ref 12–16)
IMM GRANULOCYTES NFR BLD AUTO: 0.1 % — SIGNIFICANT CHANGE UP (ref 0.1–0.3)
LYMPHOCYTES # BLD AUTO: 34.27 K/UL — HIGH (ref 1.2–3.4)
LYMPHOCYTES # BLD AUTO: 87.2 % — HIGH (ref 20.5–51.1)
MCHC RBC-ENTMCNC: 30.6 PG — SIGNIFICANT CHANGE UP (ref 27–31)
MCHC RBC-ENTMCNC: 33.3 G/DL — SIGNIFICANT CHANGE UP (ref 32–37)
MCV RBC AUTO: 91.8 FL — SIGNIFICANT CHANGE UP (ref 81–99)
MONOCYTES # BLD AUTO: 1.3 K/UL — HIGH (ref 0.1–0.6)
MONOCYTES NFR BLD AUTO: 3.3 % — SIGNIFICANT CHANGE UP (ref 1.7–9.3)
NEUTROPHILS # BLD AUTO: 3.64 K/UL — SIGNIFICANT CHANGE UP (ref 1.4–6.5)
NEUTROPHILS NFR BLD AUTO: 9.2 % — LOW (ref 42.2–75.2)
NRBC # BLD: 0 /100 WBCS — SIGNIFICANT CHANGE UP (ref 0–0)
NRBC BLD-RTO: 0 /100 WBCS — SIGNIFICANT CHANGE UP (ref 0–0)
PLATELET # BLD AUTO: 96 K/UL — LOW (ref 130–400)
PMV BLD: 10.8 FL — HIGH (ref 7.4–10.4)
POTASSIUM SERPL-MCNC: 3.3 MMOL/L — LOW (ref 3.5–5)
POTASSIUM SERPL-SCNC: 3.3 MMOL/L — LOW (ref 3.5–5)
RBC # BLD: 3.76 M/UL — LOW (ref 4.2–5.4)
RBC # FLD: 13.7 % — SIGNIFICANT CHANGE UP (ref 11.5–14.5)
SODIUM SERPL-SCNC: 142 MMOL/L — SIGNIFICANT CHANGE UP (ref 135–146)
WBC # BLD: 39.3 K/UL — HIGH (ref 4.8–10.8)
WBC # FLD AUTO: 39.3 K/UL — HIGH (ref 4.8–10.8)

## 2025-01-13 PROCEDURE — 93306 TTE W/DOPPLER COMPLETE: CPT | Mod: 26

## 2025-01-13 PROCEDURE — 93970 EXTREMITY STUDY: CPT | Mod: 26

## 2025-01-13 PROCEDURE — 99232 SBSQ HOSP IP/OBS MODERATE 35: CPT

## 2025-01-13 RX ORDER — POTASSIUM CHLORIDE 750 MG/1
40 TABLET, EXTENDED RELEASE ORAL ONCE
Refills: 0 | Status: COMPLETED | OUTPATIENT
Start: 2025-01-13 | End: 2025-01-13

## 2025-01-13 RX ADMIN — Medication 100 MILLIGRAM(S): at 14:52

## 2025-01-13 RX ADMIN — Medication 100 MILLIGRAM(S): at 00:39

## 2025-01-13 RX ADMIN — POTASSIUM CHLORIDE 40 MILLIEQUIVALENT(S): 750 TABLET, EXTENDED RELEASE ORAL at 14:42

## 2025-01-13 RX ADMIN — Medication 100 MILLIGRAM(S): at 08:48

## 2025-01-13 RX ADMIN — Medication 100 MILLIGRAM(S): at 22:42

## 2025-01-13 RX ADMIN — OSELTAMIVIR PHOSPHATE 75 MILLIGRAM(S): 75 CAPSULE ORAL at 06:34

## 2025-01-13 RX ADMIN — OSELTAMIVIR PHOSPHATE 75 MILLIGRAM(S): 75 CAPSULE ORAL at 18:19

## 2025-01-13 RX ADMIN — OSELTAMIVIR PHOSPHATE 75 MILLIGRAM(S): 75 CAPSULE ORAL at 08:48

## 2025-01-13 NOTE — PHYSICAL THERAPY INITIAL EVALUATION ADULT - WORK/LEISURE ACTIVITY, REHAB EVAL
November 21, 2023        Re: Beth Sims  [address] [address]      To whom it may concern:    This is to certify that Beth Sims is a patient of Dr. Cora Rosales at  [facility]. Beth is enrolled in the CLTS waiver program and I recommend the following items to help with her needs as noted below per item.     1.[employer]: Ult[employer]opp Pop up Sensory Tent for Kids, Extra Big [id number] Black out Calming [place]elle Den, Calm Down Fort for Kid Autism Anxiety ADHD SPD, Collapsible Autistic Sensory Room Play Tents with Door & Window : Toys & Games   Beth tends to get overstimulated and has been seeking out quiet, dark  spaces to regulate her emotions and thoughts. This would be extremely helpful in this regard.      2. [employer]: flybold  Sensory Bubble Tube Lamp - Artificial Fish Tank with Moving Fish - [place] Aquarium Lamp - Living Aquarium Lamp - Calming Autism Sensory Led - 10 Fishes & 20 Color Remote - 4ft [place] Lamp : Home & Kitchen   Her sleep patterns are scattered and many nights she wakes in the middle of the night for many hours. This will help her keep calm and hopefully assist with her falling back asleep.      3. [employer]: Autism & [place] Kids Bedtime & Sleep Calming Ocean Wave Projector Autistic Children ASD Boys Girl Teen No 1-3 Toddlers Age 3 4 5-7 8-12 Products Special Needs Room Sensory Toys Game LED Light Lamp : Baby   This will assist with her disturbed sleep patterns.     4. [employer]: LST LISKTO Busy Board Dress Learning Boards for Fine Motor Skills & Learn to Dress, Basic Life Skills Sensory Board, Learn to Zip, Snap, Tie Shoe Laces and Buckle (Type1, Orange) : Toys & Games  She is unable to independently dress herself and cannot do many of the tactile skills this learning board provides. This would assist her with learning these basic life skills required for getting dressed.       5. [employer]: FoamTouch Black Crash Pad with Foam  Blocks for Kids and Adults - Therapeutic Sensory Pad with Water Resistance Cover - 40\" x 40\" : Sports & Outdoors   As we are learning about Beth's sensory needs, we are finding that she likes to jump or pretend fall as a way to receive sensory input. This would be helpful while preventing potential for injuries.      6. [employer]: IGLU Yellow Banana Toddler Rocking Toy Child Riding Toy Toddler Rocker Rocking Horse for Toddlers 2-5 Ride on Horse Toddler Rocking Horse Hobby Horse Kids Rocking Horse   This rocking toy will help her when she is seeking sensory input. She enjoys rocking, wobbling, wiggling, etc.      Sincerely,        Cora Rosales MD  [address] [facility] [address] [phone]                 RW/needs device

## 2025-01-13 NOTE — PHYSICAL THERAPY INITIAL EVALUATION ADULT - PERTINENT HX OF CURRENT PROBLEM, REHAB EVAL
84 year old female with a history of cervical cancer presenting for lethargy and fall found on floor by family.

## 2025-01-13 NOTE — PHYSICAL THERAPY INITIAL EVALUATION ADULT - ADDITIONAL COMMENTS
pt lives alone in apartment building with elevator without stairs, was able to amb with RW prior to admission, stated her sister used to help her but she is sick at the moment

## 2025-01-13 NOTE — PROGRESS NOTE ADULT - SUBJECTIVE AND OBJECTIVE BOX
FELICE LOMBARDI 84y Female  MRN#: [id number]     SUBJECTIVE  Patient is a 84y old Female who presents with a chief complaint of weakness (12 Jan 2025 21:46)    Interval/Overnight Events:    Today is hospital day 1d, and this morning she is lying in bed without distress.   No acute overnight events.     OBJECTIVE  PAST MEDICAL & SURGICAL HISTORY  Arthritis    Cervical cancer      ALLERGIES:  ampicillin (Rash)  levofloxacin (Rash)    MEDICATIONS:  STANDING MEDICATIONS  aztreonam  IVPB 2000 milliGRAM(s) IV Intermittent every 8 hours  enoxaparin Injectable 40 milliGRAM(s) SubCutaneous every 24 hours  oseltamivir 75 milliGRAM(s) Oral two times a day    PRN MEDICATIONS  acetaminophen     Tablet .. 650 milliGRAM(s) Oral every 6 hours PRN  aluminum hydroxide/magnesium hydroxide/simethicone Suspension 30 milliLiter(s) Oral every 4 hours PRN  melatonin 3 milliGRAM(s) Oral at bedtime PRN  ondansetron Injectable 4 milliGRAM(s) IV Push every 8 hours PRN    HOME MEDICATIONS  Home Medications:      LABS:                        11.5   39.30 )-----------( 96       ( 13 Jan 2025 06:22 )             34.5     01-13    142  |  106  |  14  ----------------------------<  92  3.3[L]   |  23  |  0.9    Ca    8.8      13 Jan 2025 06:22  Mg     1.5     01-12    TPro  5.6[L]  /  Alb  3.6  /  TBili  0.7  /  DBili  x   /  AST  32  /  ALT  10  /  AlkPhos  72  01-12    LIVER FUNCTIONS - ( 12 Jan 2025 17:15 )  Alb: 3.6 g/dL / Pro: 5.6 g/dL / ALK PHOS: 72 U/L / ALT: 10 U/L / AST: 32 U/L / GGT: x           PT/INR - ( 12 Jan 2025 17:15 )   PT: 14.60 sec;   INR: 1.23 ratio         PTT - ( 12 Jan 2025 17:15 )  PTT:30.2 sec  Urinalysis Basic - ( 13 Jan 2025 06:22 )    Color: x / Appearance: x / SG: x / pH: x  Gluc: 92 mg/dL / Ketone: x  / Bili: x / Urobili: x   Blood: x / Protein: x / Nitrite: x   Leuk Esterase: x / RBC: x / WBC x   Sq Epi: x / Non Sq Epi: x / Bacteria: x            Urinalysis with Rflx Culture (collected 12 Jan 2025 20:41)          CAPILLARY BLOOD GLUCOSE      POCT Blood Glucose.: 104 mg/dL (12 Jan 2025 17:11)      PHYSICAL EXAM:  T(C): 37.7 (01-13-25 @ 14:04), Max: 38.2 (01-13-25 @ 05:14)  HR: 82 (01-13-25 @ 14:04) (73 - 82)  BP: 147/67 (01-13-25 @ 14:04) (137/74 - 157/65)  RR: 18 (01-13-25 @ 14:04) (18 - 18)  SpO2: 93% (01-13-25 @ 05:14) (93% - 96%)    GENERAL: NAD, lying in bed comfortably  HEAD:  Atraumatic, normocephalic  EYES: EOMI, PERRLA, conjunctiva and sclera clear  ENT: Moist mucous membranes  HEART: Regular rate and rhythm,   LUNGS: Unlabored respirations.  + wheeze RLL, b/l rhonchi   ABDOMEN: Soft, nontender, nondistended,  EXTREMITIES:  bilateral 2+ pitting edema   NERVOUS SYSTEM:  A&Ox3  SKIN: No rashes or lesions    ADMISSION SUMMARY  Patient is a 84y old Female who presents with a chief complaint of weakness (12 Jan 2025 21:46)

## 2025-01-13 NOTE — PHYSICAL THERAPY INITIAL EVALUATION ADULT - GENERAL OBSERVATIONS, REHAB EVAL
10;10-10.40 pt was seen for PT IE at bed side, pt is agreeable, chart thoroughly reviewed, RN Leeanna is aware.  Pt was received semi north in bed, in no apparent distress, +hep lock, +primafit, +call bell within reach, bed side table at reach

## 2025-01-14 LAB
ANION GAP SERPL CALC-SCNC: 12 MMOL/L — SIGNIFICANT CHANGE UP (ref 7–14)
BUN SERPL-MCNC: 13 MG/DL — SIGNIFICANT CHANGE UP (ref 10–20)
C DIFF GDH STL QL: NEGATIVE — SIGNIFICANT CHANGE UP
C DIFF GDH STL QL: SIGNIFICANT CHANGE UP
CALCIUM SERPL-MCNC: 9.2 MG/DL — SIGNIFICANT CHANGE UP (ref 8.4–10.5)
CHLORIDE SERPL-SCNC: 106 MMOL/L — SIGNIFICANT CHANGE UP (ref 98–110)
CO2 SERPL-SCNC: 23 MMOL/L — SIGNIFICANT CHANGE UP (ref 17–32)
CREAT SERPL-MCNC: 0.9 MG/DL — SIGNIFICANT CHANGE UP (ref 0.7–1.5)
CULTURE RESULTS: SIGNIFICANT CHANGE UP
EGFR: 64 ML/MIN/1.73M2 — SIGNIFICANT CHANGE UP
GLUCOSE SERPL-MCNC: 100 MG/DL — HIGH (ref 70–99)
HCT VFR BLD CALC: 38.1 % — SIGNIFICANT CHANGE UP (ref 37–47)
HGB BLD-MCNC: 12.7 G/DL — SIGNIFICANT CHANGE UP (ref 12–16)
MAGNESIUM SERPL-MCNC: 1.8 MG/DL — SIGNIFICANT CHANGE UP (ref 1.8–2.4)
MCHC RBC-ENTMCNC: 30.5 PG — SIGNIFICANT CHANGE UP (ref 27–31)
MCHC RBC-ENTMCNC: 33.3 G/DL — SIGNIFICANT CHANGE UP (ref 32–37)
MCV RBC AUTO: 91.4 FL — SIGNIFICANT CHANGE UP (ref 81–99)
NRBC # BLD: 0 /100 WBCS — SIGNIFICANT CHANGE UP (ref 0–0)
NRBC BLD-RTO: 0 /100 WBCS — SIGNIFICANT CHANGE UP (ref 0–0)
PLATELET # BLD AUTO: 112 K/UL — LOW (ref 130–400)
PMV BLD: 10.7 FL — HIGH (ref 7.4–10.4)
POTASSIUM SERPL-MCNC: 3.9 MMOL/L — SIGNIFICANT CHANGE UP (ref 3.5–5)
POTASSIUM SERPL-SCNC: 3.9 MMOL/L — SIGNIFICANT CHANGE UP (ref 3.5–5)
RBC # BLD: 4.17 M/UL — LOW (ref 4.2–5.4)
RBC # FLD: 13.8 % — SIGNIFICANT CHANGE UP (ref 11.5–14.5)
SODIUM SERPL-SCNC: 141 MMOL/L — SIGNIFICANT CHANGE UP (ref 135–146)
SPECIMEN SOURCE: SIGNIFICANT CHANGE UP
WBC # BLD: 57.59 K/UL — CRITICAL HIGH (ref 4.8–10.8)
WBC # FLD AUTO: 57.59 K/UL — CRITICAL HIGH (ref 4.8–10.8)

## 2025-01-14 PROCEDURE — 99233 SBSQ HOSP IP/OBS HIGH 50: CPT

## 2025-01-14 PROCEDURE — 99222 1ST HOSP IP/OBS MODERATE 55: CPT | Mod: FS

## 2025-01-14 RX ORDER — LOPERAMIDE HYDROCHLORIDE 2 MG/1
2 CAPSULE ORAL EVERY 6 HOURS
Refills: 0 | Status: DISCONTINUED | OUTPATIENT
Start: 2025-01-14 | End: 2025-01-18

## 2025-01-14 RX ORDER — BACTERIOSTATIC SODIUM CHLORIDE 0.9 %
1000 VIAL (ML) INJECTION
Refills: 0 | Status: DISCONTINUED | OUTPATIENT
Start: 2025-01-14 | End: 2025-01-18

## 2025-01-14 RX ADMIN — Medication 20 MILLIGRAM(S): at 06:00

## 2025-01-14 RX ADMIN — Medication 100 MILLIGRAM(S): at 06:00

## 2025-01-14 RX ADMIN — Medication 100 MILLIGRAM(S): at 13:24

## 2025-01-14 RX ADMIN — Medication 75 MILLILITER(S): at 21:30

## 2025-01-14 RX ADMIN — Medication 100 MILLIGRAM(S): at 21:30

## 2025-01-14 RX ADMIN — OSELTAMIVIR PHOSPHATE 75 MILLIGRAM(S): 75 CAPSULE ORAL at 06:00

## 2025-01-14 RX ADMIN — OSELTAMIVIR PHOSPHATE 75 MILLIGRAM(S): 75 CAPSULE ORAL at 17:02

## 2025-01-14 RX ADMIN — Medication 75 MILLILITER(S): at 07:05

## 2025-01-14 NOTE — CONSULT NOTE ADULT - SUBJECTIVE AND OBJECTIVE BOX
Heme/Onc Consult Note:    HPI:  Patient is an 84 year old female with a history of cervical cancer who presents to the ED after being found on floor by family today. Patient reports increased weakness and lethargy since yesterday. She fell, hit her head, and could not get up, but does not remember what time she fell. Her sisters found her on the floor this morning. Last known contact with patient was last night. Patient states that she has had a cough and congestion for the past week. Patient lives alone and walks with walker. She also refers to urinary incontinence and burning sensation with urination. Patient is noncompliant with medical care and has not seen a doctor in several years. She denies chest pain, SOB, fever, chills, dizziness, nausea, vomiting and abdominal pain. Patient now with diarrhea since receiving magnesium supplementation.  (12 Jan 2025 21:46)    Heme/Onc consulted for severe leukocytosis:  This is an 83y/o with PMhx cervical cancer x 10 years ago. Pt is noncompliant with her medical care and hasn't seen a doctor in several years. Pt is now admitted for +influenza, UTI and diarrhea. Pt mentions she has been experiencing nonbloody diarrhea x 1-2 weeks. Denies any fevers/chills/neausea, vomiting or abdominal pain. Denies melena or hematochezia. Admits weight loss about 15 lbs in 2 months. Denies any hx of blood cancer. Pt never had a colonoscopy. Denies smoking or etoh.      Allergies    ampicillin (Rash)  levofloxacin (Rash)    Intolerances        MEDICATIONS  (STANDING):  aztreonam  IVPB 2000 milliGRAM(s) IV Intermittent every 8 hours  enoxaparin Injectable 40 milliGRAM(s) SubCutaneous every 24 hours  furosemide    Tablet 20 milliGRAM(s) Oral daily  oseltamivir 75 milliGRAM(s) Oral two times a day  sodium chloride 0.9%. 1000 milliLiter(s) (75 mL/Hr) IV Continuous <Continuous>    MEDICATIONS  (PRN):  acetaminophen     Tablet .. 650 milliGRAM(s) Oral every 6 hours PRN Temp greater or equal to 38C (100.4F), Mild Pain (1 - 3)  aluminum hydroxide/magnesium hydroxide/simethicone Suspension 30 milliLiter(s) Oral every 4 hours PRN Dyspepsia  melatonin 3 milliGRAM(s) Oral at bedtime PRN Insomnia  ondansetron Injectable 4 milliGRAM(s) IV Push every 8 hours PRN Nausea and/or Vomiting      PAST MEDICAL & SURGICAL HISTORY:  Arthritis      Cervical cancer      FAMILY HISTORY:  son: brain cancer  Mother: breast cancer      SOCIAL HISTORY: No EtOH, no tobacco    REVIEW OF SYSTEMS:    CONSTITUTIONAL: +weakness, no fevers or chills  EYES/ENT: No visual changes;  No vertigo or throat pain   NECK: No pain or stiffness  RESPIRATORY: mild cough, + wheezing, no hemoptysis; No shortness of breath  CARDIOVASCULAR: No chest pain or palpitations  GASTROINTESTINAL: No abdominal or epigastric pain. No nausea, vomiting, or hematemesis; No diarrhea or constipation. No melena or hematochezia.  GENITOURINARY: No dysuria, frequency or hematuria  NEUROLOGICAL: No numbness or weakness  SKIN: No itching, burning, rashes, or lesions   All other review of systems is negative unless indicated above.        T(F): 97.6 (01-14-25 @ 04:30), Max: 100.2 (01-13-25 @ 21:39)  HR: 65 (01-14-25 @ 04:30)  BP: 133/72 (01-14-25 @ 04:30)  RR: 18 (01-14-25 @ 04:30)  SpO2: 96% (01-14-25 @ 04:30)  Wt(kg): --    GENERAL: NAD, well-developed  HEAD:  Atraumatic, Normocephalic  EYES: EOMI, PERRLA, conjunctiva and sclera clear  NECK: Supple, No JVD  CHEST/LUNG: Clear to auscultation bilaterally; No wheeze  HEART: Regular rate and rhythm; No murmurs, rubs, or gallops  ABDOMEN: Soft, Nontender, Nondistended; Bowel sounds present  EXTREMITIES:  2+ Peripheral Pulses, No clubbing, cyanosis, or edema  NEUROLOGY: non-focal  SKIN: No rashes or lesions                          12.7   57.59 )-----------( 112      ( 14 Jan 2025 07:57 )             38.1       01-14    141  |  106  |  13  ----------------------------<  100[H]  3.9   |  23  |  0.9    Ca    9.2      14 Jan 2025 07:57  Mg     1.8     01-14    TPro  5.6[L]  /  Alb  3.6  /  TBili  0.7  /  DBili  x   /  AST  32  /  ALT  10  /  AlkPhos  72  01-12      Magnesium: 1.8 mg/dL (01-14 @ 07:57)

## 2025-01-14 NOTE — PROGRESS NOTE ADULT - SUBJECTIVE AND OBJECTIVE BOX
FELICE LOMBARDI 84y Female  MRN#: [id number]     SUBJECTIVE  Patient is a 84y old Female who presents with a chief complaint of weakness (14 Jan 2025 13:07)    Interval/Overnight Events:    Today is hospital day 2d, and this morning she is lying in bed without distress.   No acute overnight events.     OBJECTIVE  PAST MEDICAL & SURGICAL HISTORY  Arthritis    Cervical cancer      ALLERGIES:  ampicillin (Rash)  levofloxacin (Rash)    MEDICATIONS:  STANDING MEDICATIONS  aztreonam  IVPB 2000 milliGRAM(s) IV Intermittent every 8 hours  enoxaparin Injectable 40 milliGRAM(s) SubCutaneous every 24 hours  furosemide    Tablet 20 milliGRAM(s) Oral daily  oseltamivir 75 milliGRAM(s) Oral two times a day  sodium chloride 0.9%. 1000 milliLiter(s) IV Continuous <Continuous>    PRN MEDICATIONS  acetaminophen     Tablet .. 650 milliGRAM(s) Oral every 6 hours PRN  aluminum hydroxide/magnesium hydroxide/simethicone Suspension 30 milliLiter(s) Oral every 4 hours PRN  melatonin 3 milliGRAM(s) Oral at bedtime PRN  ondansetron Injectable 4 milliGRAM(s) IV Push every 8 hours PRN    HOME MEDICATIONS  Home Medications:      LABS:                        12.7   57.59 )-----------( 112      ( 14 Jan 2025 07:57 )             38.1     01-14    141  |  106  |  13  ----------------------------<  100[H]  3.9   |  23  |  0.9    Ca    9.2      14 Jan 2025 07:57  Mg     1.8     01-14    TPro  5.6[L]  /  Alb  3.6  /  TBili  0.7  /  DBili  x   /  AST  32  /  ALT  10  /  AlkPhos  72  01-12    LIVER FUNCTIONS - ( 12 Jan 2025 17:15 )  Alb: 3.6 g/dL / Pro: 5.6 g/dL / ALK PHOS: 72 U/L / ALT: 10 U/L / AST: 32 U/L / GGT: x           PT/INR - ( 12 Jan 2025 17:15 )   PT: 14.60 sec;   INR: 1.23 ratio         PTT - ( 12 Jan 2025 17:15 )  PTT:30.2 sec  Urinalysis Basic - ( 14 Jan 2025 07:57 )    Color: x / Appearance: x / SG: x / pH: x  Gluc: 100 mg/dL / Ketone: x  / Bili: x / Urobili: x   Blood: x / Protein: x / Nitrite: x   Leuk Esterase: x / RBC: x / WBC x   Sq Epi: x / Non Sq Epi: x / Bacteria: x            Urinalysis with Rflx Culture (collected 12 Jan 2025 20:41)    Culture - Urine (collected 12 Jan 2025 20:41)  Source: Clean Catch None  Final Report (14 Jan 2025 12:51):    >=3 organisms. Probable collection contamination.    Culture - Blood (collected 12 Jan 2025 17:15)  Source: .Blood BLOOD  Preliminary Report (13 Jan 2025 23:01):    No growth at 24 hours    Culture - Blood (collected 12 Jan 2025 17:15)  Source: .Blood BLOOD  Preliminary Report (13 Jan 2025 23:01):    No growth at 24 hours          CAPILLARY BLOOD GLUCOSE      POCT Blood Glucose.: 104 mg/dL (12 Jan 2025 17:11)      PHYSICAL EXAM:  T(C): 37 (01-14-25 @ 13:47), Max: 37.9 (01-13-25 @ 21:39)  HR: 73 (01-14-25 @ 13:47) (65 - 86)  BP: 149/79 (01-14-25 @ 13:47) (133/72 - 149/79)  RR: 18 (01-14-25 @ 04:30) (18 - 18)  SpO2: 95% (01-14-25 @ 13:47) (95% - 96%)    GENERAL: NAD, lying in bed comfortably  HEAD:  Atraumatic, normocephalic  EYES: EOMI, PERRLA, conjunctiva and sclera clear  ENT: Moist mucous membranes  HEART: Regular rate and rhythm,   LUNGS: Unlabored respirations.  + wheeze RLL, b/l rhonchi   ABDOMEN: Soft, nontender, nondistended,  EXTREMITIES:  bilateral 2+ pitting edema   NERVOUS SYSTEM:  A&Ox3  SKIN: No rashes or lesions      ADMISSION SUMMARY  Patient is a 84y old Female who presents with a chief complaint of weakness (14 Jan 2025 13:07)

## 2025-01-14 NOTE — CONSULT NOTE ADULT - NS ATTEND AMEND GEN_ALL_CORE FT
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  I explained that she had this for several years.  We only treat CLL if she is symptomatic of lymphocyte doubling count is less than 6 months once white blood cell count is greater than 30,000.  Recommend she follows up as outpatient depending how she is doing we will decide if she needs active treatment or surveillance although favoring surveillance

## 2025-01-15 LAB
ANION GAP SERPL CALC-SCNC: 12 MMOL/L — SIGNIFICANT CHANGE UP (ref 7–14)
BUN SERPL-MCNC: 12 MG/DL — SIGNIFICANT CHANGE UP (ref 10–20)
CALCIUM SERPL-MCNC: 7.8 MG/DL — LOW (ref 8.4–10.5)
CHLORIDE SERPL-SCNC: 107 MMOL/L — SIGNIFICANT CHANGE UP (ref 98–110)
CO2 SERPL-SCNC: 22 MMOL/L — SIGNIFICANT CHANGE UP (ref 17–32)
CREAT SERPL-MCNC: 0.9 MG/DL — SIGNIFICANT CHANGE UP (ref 0.7–1.5)
EGFR: 64 ML/MIN/1.73M2 — SIGNIFICANT CHANGE UP
GI PCR PANEL: SIGNIFICANT CHANGE UP
GLUCOSE SERPL-MCNC: 113 MG/DL — HIGH (ref 70–99)
HCT VFR BLD CALC: 36 % — LOW (ref 37–47)
HGB BLD-MCNC: 11.6 G/DL — LOW (ref 12–16)
MAGNESIUM SERPL-MCNC: 1.5 MG/DL — LOW (ref 1.8–2.4)
MCHC RBC-ENTMCNC: 29.5 PG — SIGNIFICANT CHANGE UP (ref 27–31)
MCHC RBC-ENTMCNC: 32.2 G/DL — SIGNIFICANT CHANGE UP (ref 32–37)
MCV RBC AUTO: 91.6 FL — SIGNIFICANT CHANGE UP (ref 81–99)
NRBC # BLD: 0 /100 WBCS — SIGNIFICANT CHANGE UP (ref 0–0)
NRBC BLD-RTO: 0 /100 WBCS — SIGNIFICANT CHANGE UP (ref 0–0)
PLATELET # BLD AUTO: 105 K/UL — LOW (ref 130–400)
PMV BLD: 10.7 FL — HIGH (ref 7.4–10.4)
POTASSIUM SERPL-MCNC: 3.1 MMOL/L — LOW (ref 3.5–5)
POTASSIUM SERPL-SCNC: 3.1 MMOL/L — LOW (ref 3.5–5)
RBC # BLD: 3.93 M/UL — LOW (ref 4.2–5.4)
RBC # FLD: 13.4 % — SIGNIFICANT CHANGE UP (ref 11.5–14.5)
SODIUM SERPL-SCNC: 141 MMOL/L — SIGNIFICANT CHANGE UP (ref 135–146)
WBC # BLD: 49.64 K/UL — CRITICAL HIGH (ref 4.8–10.8)
WBC # FLD AUTO: 49.64 K/UL — CRITICAL HIGH (ref 4.8–10.8)

## 2025-01-15 PROCEDURE — 99232 SBSQ HOSP IP/OBS MODERATE 35: CPT

## 2025-01-15 RX ORDER — MAGNESIUM SULFATE 0.8 MEQ/ML
2 AMPUL (ML) INJECTION EVERY 6 HOURS
Refills: 0 | Status: COMPLETED | OUTPATIENT
Start: 2025-01-15 | End: 2025-01-15

## 2025-01-15 RX ORDER — ANTISEPTIC SURGICAL SCRUB 0.04 MG/ML
1 SOLUTION TOPICAL
Refills: 0 | Status: DISCONTINUED | OUTPATIENT
Start: 2025-01-15 | End: 2025-01-22

## 2025-01-15 RX ORDER — POTASSIUM CHLORIDE 750 MG/1
40 TABLET, EXTENDED RELEASE ORAL EVERY 4 HOURS
Refills: 0 | Status: COMPLETED | OUTPATIENT
Start: 2025-01-15 | End: 2025-01-15

## 2025-01-15 RX ADMIN — Medication 100 MILLIGRAM(S): at 21:30

## 2025-01-15 RX ADMIN — Medication 12.5 GRAM(S): at 12:37

## 2025-01-15 RX ADMIN — ENOXAPARIN SODIUM 40 MILLIGRAM(S): 100 INJECTION SUBCUTANEOUS at 06:13

## 2025-01-15 RX ADMIN — OSELTAMIVIR PHOSPHATE 75 MILLIGRAM(S): 75 CAPSULE ORAL at 06:14

## 2025-01-15 RX ADMIN — Medication 100 MILLIGRAM(S): at 14:50

## 2025-01-15 RX ADMIN — Medication 12.5 GRAM(S): at 17:36

## 2025-01-15 RX ADMIN — OSELTAMIVIR PHOSPHATE 75 MILLIGRAM(S): 75 CAPSULE ORAL at 17:35

## 2025-01-15 RX ADMIN — POTASSIUM CHLORIDE 40 MILLIEQUIVALENT(S): 750 TABLET, EXTENDED RELEASE ORAL at 12:35

## 2025-01-15 RX ADMIN — POTASSIUM CHLORIDE 40 MILLIEQUIVALENT(S): 750 TABLET, EXTENDED RELEASE ORAL at 17:35

## 2025-01-15 RX ADMIN — Medication 100 MILLIGRAM(S): at 06:14

## 2025-01-15 RX ADMIN — Medication 20 MILLIGRAM(S): at 06:14

## 2025-01-15 NOTE — DIETITIAN INITIAL EVALUATION ADULT - CALCULATED TO (G/KG)
74.48 Render Note In Bullet Format When Appropriate: No Medical Necessity Clause: This procedure was medically necessary because the lesions that were treated were: Anesthesia Volume In Cc: 0.5 Anesthesia Type: 2% lidocaine without epinephrine Post-Care Instructions: I reviewed with the patient in detail post-care instructions. Patient is to wear sunprotection, and avoid picking at any of the treated lesions. Pt may apply Vaseline to crusted or scabbing areas. Treatment Number (Will Not Render If 0): 0 Consent: The patient's consent was obtained including but not limited to risks of crusting, scabbing, blistering, scarring, darker or lighter pigmentary change, recurrence, incomplete removal and infection. Detail Level: Detailed Medical Necessity Information: It is in your best interest to select a reason for this procedure from the list below. All of these items fulfill various CMS LCD requirements except the new and changing color options.

## 2025-01-15 NOTE — DIETITIAN INITIAL EVALUATION ADULT - PERTINENT MEDS FT
MEDICATIONS  (STANDING):  chlorhexidine 2% Cloths 1 Application(s) Topical <User Schedule>  enoxaparin Injectable 40 milliGRAM(s) SubCutaneous every 24 hours  furosemide    Tablet 20 milliGRAM(s) Oral daily  oseltamivir 75 milliGRAM(s) Oral two times a day  sodium chloride 0.9%. 1000 milliLiter(s) (75 mL/Hr) IV Continuous <Continuous>    MEDICATIONS  (PRN):  acetaminophen     Tablet .. 650 milliGRAM(s) Oral every 6 hours PRN Temp greater or equal to 38C (100.4F), Mild Pain (1 - 3)  aluminum hydroxide/magnesium hydroxide/simethicone Suspension 30 milliLiter(s) Oral every 4 hours PRN Dyspepsia  loperamide 2 milliGRAM(s) Oral every 6 hours PRN Diarrhea  melatonin 3 milliGRAM(s) Oral at bedtime PRN Insomnia  ondansetron Injectable 4 milliGRAM(s) IV Push every 8 hours PRN Nausea and/or Vomiting

## 2025-01-15 NOTE — CONSULT NOTE ADULT - SUBJECTIVE AND OBJECTIVE BOX
INFECTIOUS DISEASE CONSULT NOTE    Patient is a 84y old  Female who presents with a chief complaint of weakness (14 Jan 2025 13:07)    HPI:  Patient is an 84 year old female with a history of cervical cancer who presents to the ED after being found on floor by family today. Patient reports increased weakness and lethargy since yesterday. She fell, hit her head, and could not get up, but does not remember what time she fell. Her sisters found her on the floor this morning. Last known contact with patient was last night. Patient states that she has had a cough and congestion for the past week. Patient lives alone and walks with walker. She also refers to urinary incontinence and burning sensation with urination. Patient is noncompliant with medical care and has not seen a doctor in several years. She denies chest pain, SOB, fever, chills, dizziness, nausea, vomiting and abdominal pain. Patient now with diarrhea since receiving magnesium supplementation.  (12 Jan 2025 21:46)         Prior hospital charts reviewed [Yes]  Primary team notes reviewed [Yes]  Other consultant notes reviewed [Yes]    REVIEW OF SYSTEMS:      PAST MEDICAL & SURGICAL HISTORY:  Arthritis      Cervical cancer          SOCIAL HISTORY:  - Born in _____, migrated to  in 19XX  - Currently working as / Retired  - Lives with _____; no pets  - No recent travel  - Denies tobacco use  - Denies alcohol use  - Denies illicit drug use  - Currently sexually active, has one male/female sexual partner    FAMILY HISTORY:      Allergies:  ampicillin (Rash)  levofloxacin (Rash)      ANTIMICROBIALS:  aztreonam  IVPB 2000 every 8 hours  oseltamivir 75 two times a day      ANTIMICROBIALS (past 90 days):  MEDICATIONS  (STANDING):  aztreonam  IVPB   100 mL/Hr IV Intermittent (01-15-25 @ 06:14)   100 mL/Hr IV Intermittent (01-14-25 @ 21:30)   100 mL/Hr IV Intermittent (01-14-25 @ 13:24)   100 mL/Hr IV Intermittent (01-14-25 @ 06:00)   100 mL/Hr IV Intermittent (01-13-25 @ 22:42)   100 mL/Hr IV Intermittent (01-13-25 @ 14:52)   100 mL/Hr IV Intermittent (01-13-25 @ 08:48)   100 mL/Hr IV Intermittent (01-13-25 @ 00:39)    oseltamivir   75 milliGRAM(s) Oral (01-15-25 @ 06:14)   75 milliGRAM(s) Oral (01-14-25 @ 17:02)   75 milliGRAM(s) Oral (01-14-25 @ 06:00)   75 milliGRAM(s) Oral (01-13-25 @ 18:19)   75 milliGRAM(s) Oral (01-13-25 @ 08:48)   75 milliGRAM(s) Oral (01-13-25 @ 06:34)        OTHER MEDS:   MEDICATIONS  (STANDING):  acetaminophen     Tablet .. 650 every 6 hours PRN  aluminum hydroxide/magnesium hydroxide/simethicone Suspension 30 every 4 hours PRN  enoxaparin Injectable 40 every 24 hours  furosemide    Tablet 20 daily  loperamide 2 every 6 hours PRN  melatonin 3 at bedtime PRN  ondansetron Injectable 4 every 8 hours PRN      VITALS:  Vital Signs Last 24 Hrs  T(F): 97.8 (01-15-25 @ 05:26), Max: 100.7 (01-13-25 @ 05:14)    Vital Signs Last 24 Hrs  HR: 80 (01-15-25 @ 05:26) (73 - 80)  BP: 114/61 (01-15-25 @ 05:26) (114/61 - 162/65)  RR: 16 (01-15-25 @ 05:26)  SpO2: 96% (01-15-25 @ 05:26) (94% - 96%)  Wt(kg): --    EXAM:    Labs:                        11.6   49.64 )-----------( 105      ( 15 Marcos 2025 07:33 )             36.0     01-15    141  |  107  |  12  ----------------------------<  113[H]  3.1[L]   |  22  |  0.9    Ca    7.8[L]      15 Marcos 2025 07:33  Mg     1.5     01-15        WBC Trend:  WBC Count: 49.64 (01-15-25 @ 07:33)  WBC Count: 57.59 (01-14-25 @ 07:57)  WBC Count: 39.30 (01-13-25 @ 06:22)  WBC Count: 44.05 (01-12-25 @ 17:15)      Auto Neutrophil #: 3.64 K/uL (01-13-25 @ 06:22)  Auto Neutrophil #: 6.61 K/uL (01-12-25 @ 17:15)      Creatine Trend:  Creatinine: 0.9 (01-15)  Creatinine: 0.9 (01-14)  Creatinine: 0.9 (01-13)  Creatinine: 1.0 (01-12)      Liver Biochemical Testing Trend:  Alanine Aminotransferase (ALT/SGPT): 10 (01-12)  Aspartate Aminotransferase (AST/SGOT): 32 (01-12-25 @ 17:15)  Bilirubin Total: 0.7 (01-12)      Trend LDH      Auto Eosinophil %: 0.1 % (01-13-25 @ 06:22)  Auto Eosinophil %: 0.0 % (01-12-25 @ 17:15)      Urinalysis Basic - ( 15 Marcos 2025 07:33 )    Color: x / Appearance: x / SG: x / pH: x  Gluc: 113 mg/dL / Ketone: x  / Bili: x / Urobili: x   Blood: x / Protein: x / Nitrite: x   Leuk Esterase: x / RBC: x / WBC x   Sq Epi: x / Non Sq Epi: x / Bacteria: x          MICROBIOLOGY:        Urinalysis with Rflx Culture (collected 12 Jan 2025 20:41)    Culture - Urine (collected 12 Jan 2025 20:41)  Source: Clean Catch None  Final Report (14 Jan 2025 12:51):    >=3 organisms. Probable collection contamination.    Culture - Blood (collected 12 Jan 2025 17:15)  Source: .Blood BLOOD  Preliminary Report (14 Jan 2025 23:02):    No growth at 48 Hours    Culture - Blood (collected 12 Jan 2025 17:15)  Source: .Blood BLOOD  Preliminary Report (14 Jan 2025 23:02):    No growth at 48 Hours                                                    Lactate, Blood: 0.9 (01-12 @ 17:15)          RADIOLOGY:  imaging below personally reviewed   INFECTIOUS DISEASE CONSULT NOTE    Patient is a 84y old  Female who presents with a chief complaint of weakness (14 Jan 2025 13:07)    HPI:  Patient is an 84 year old female with a history of cervical cancer who presents to the ED after being found on floor by family today. Patient reports increased weakness and lethargy since yesterday. She fell, hit her head, and could not get up, but does not remember what time she fell. Her sisters found her on the floor this morning. Last known contact with patient was last night. Patient states that she has had a cough and congestion for the past week. Patient lives alone and walks with walker. She also refers to urinary incontinence and burning sensation with urination. Patient is noncompliant with medical care and has not seen a doctor in several years. She denies chest pain, SOB, fever, chills, dizziness, nausea, vomiting and abdominal pain. Patient now with diarrhea since receiving magnesium supplementation.  (12 Jan 2025 21:46)      Prior hospital charts reviewed [Yes]  Primary team notes reviewed [Yes]  Other consultant notes reviewed [Yes]    REVIEW OF SYSTEMS:  CONSTITUTIONAL: No fever or chills  HEAD: No lesion on scalp  EYES: No visual disturbance  ENT: No sore throat  RESPIRATORY: mild cough, no shortness of breath  CARDIOVASCULAR: No chest pain or palpitations  GASTROINTESTINAL: No abdominal or epigastric pain; no more diarrhea  GENITOURINARY: No dysuria  NEUROLOGICAL: No headache/dizziness  MUSCULOSKELETAL: No joint pain, erythema, or swelling; no back pain  SKIN: No itching, rashes  All other ROS negative except noted above      PAST MEDICAL & SURGICAL HISTORY:  Arthritis      Cervical cancer      SOCIAL HISTORY:  - No recent travel  - Denies tobacco use  - Denies alcohol use  - Denies illicit drug use    FAMILY HISTORY:  No family history of hematologic malignancy    Allergies:  ampicillin (Rash)  levofloxacin (Rash)      ANTIMICROBIALS:  aztreonam  IVPB 2000 every 8 hours  oseltamivir 75 two times a day      ANTIMICROBIALS (past 90 days):  MEDICATIONS  (STANDING):  aztreonam  IVPB   100 mL/Hr IV Intermittent (01-15-25 @ 06:14)   100 mL/Hr IV Intermittent (01-14-25 @ 21:30)   100 mL/Hr IV Intermittent (01-14-25 @ 13:24)   100 mL/Hr IV Intermittent (01-14-25 @ 06:00)   100 mL/Hr IV Intermittent (01-13-25 @ 22:42)   100 mL/Hr IV Intermittent (01-13-25 @ 14:52)   100 mL/Hr IV Intermittent (01-13-25 @ 08:48)   100 mL/Hr IV Intermittent (01-13-25 @ 00:39)    oseltamivir   75 milliGRAM(s) Oral (01-15-25 @ 06:14)   75 milliGRAM(s) Oral (01-14-25 @ 17:02)   75 milliGRAM(s) Oral (01-14-25 @ 06:00)   75 milliGRAM(s) Oral (01-13-25 @ 18:19)   75 milliGRAM(s) Oral (01-13-25 @ 08:48)   75 milliGRAM(s) Oral (01-13-25 @ 06:34)        OTHER MEDS:   MEDICATIONS  (STANDING):  acetaminophen     Tablet .. 650 every 6 hours PRN  aluminum hydroxide/magnesium hydroxide/simethicone Suspension 30 every 4 hours PRN  enoxaparin Injectable 40 every 24 hours  furosemide    Tablet 20 daily  loperamide 2 every 6 hours PRN  melatonin 3 at bedtime PRN  ondansetron Injectable 4 every 8 hours PRN      VITALS:  Vital Signs Last 24 Hrs  T(F): 97.8 (01-15-25 @ 05:26), Max: 100.7 (01-13-25 @ 05:14)    Vital Signs Last 24 Hrs  HR: 80 (01-15-25 @ 05:26) (73 - 80)  BP: 114/61 (01-15-25 @ 05:26) (114/61 - 162/65)  RR: 16 (01-15-25 @ 05:26)  SpO2: 96% (01-15-25 @ 05:26) (94% - 96%)  Wt(kg): --    EXAM:  GENERAL: NAD, lying in bed  HEAD: No head lesions  EYES: Conjunctiva pink and cornea white  EAR, NOSE, MOUTH, THROAT: Normal external ears and nose, no discharges; moist mucous membranes  NECK: Supple, nontender to palpation; no JVD  RESPIRATORY: Clear to auscultation bilaterally  CARDIOVASCULAR: S1 S2  GASTROINTESTINAL: Soft, nontender, nondistended; normoactive bowel sounds  GENITOURINARY: No bee catheter, no CVA tenderness  EXTREMITIES: No clubbing, cyanosis, or petal edema  NERVOUS SYSTEM: Alert and oriented to person, time, place and situation, speech clear. No focal deficits   MUSCULOSKELETAL: No joint erythema, swelling or pain  SKIN: No rashes or lesions, no superficial thrombophlebitis  PSYCH: Normal affect    Labs:                        11.6   49.64 )-----------( 105      ( 15 Marcos 2025 07:33 )             36.0     01-15    141  |  107  |  12  ----------------------------<  113[H]  3.1[L]   |  22  |  0.9    Ca    7.8[L]      15 Marcos 2025 07:33  Mg     1.5     01-15        WBC Trend:  WBC Count: 49.64 (01-15-25 @ 07:33)  WBC Count: 57.59 (01-14-25 @ 07:57)  WBC Count: 39.30 (01-13-25 @ 06:22)  WBC Count: 44.05 (01-12-25 @ 17:15)      Auto Neutrophil #: 3.64 K/uL (01-13-25 @ 06:22)  Auto Neutrophil #: 6.61 K/uL (01-12-25 @ 17:15)      Creatine Trend:  Creatinine: 0.9 (01-15)  Creatinine: 0.9 (01-14)  Creatinine: 0.9 (01-13)  Creatinine: 1.0 (01-12)      Liver Biochemical Testing Trend:  Alanine Aminotransferase (ALT/SGPT): 10 (01-12)  Aspartate Aminotransferase (AST/SGOT): 32 (01-12-25 @ 17:15)  Bilirubin Total: 0.7 (01-12)      Trend LDH      Auto Eosinophil %: 0.1 % (01-13-25 @ 06:22)  Auto Eosinophil %: 0.0 % (01-12-25 @ 17:15)      Urinalysis Basic - ( 15 Marcos 2025 07:33 )    Color: x / Appearance: x / SG: x / pH: x  Gluc: 113 mg/dL / Ketone: x  / Bili: x / Urobili: x   Blood: x / Protein: x / Nitrite: x   Leuk Esterase: x / RBC: x / WBC x   Sq Epi: x / Non Sq Epi: x / Bacteria: x          MICROBIOLOGY:        Urinalysis with Rflx Culture (collected 12 Jan 2025 20:41)    Culture - Urine (collected 12 Jan 2025 20:41)  Source: Clean Catch None  Final Report (14 Jan 2025 12:51):    >=3 organisms. Probable collection contamination.    Culture - Blood (collected 12 Jan 2025 17:15)  Source: .Blood BLOOD  Preliminary Report (14 Jan 2025 23:02):    No growth at 48 Hours    Culture - Blood (collected 12 Jan 2025 17:15)  Source: .Blood BLOOD  Preliminary Report (14 Jan 2025 23:02):    No growth at 48 Hours    Lactate, Blood: 0.9 (01-12 @ 17:15)      RADIOLOGY:  imaging below personally reviewed    < from: [facility] Duplex Lower Ext Vein Scan, Bilat (01.13.25 @ 11:29) >    IMPRESSION:  No evidence of deep venous thrombosis in either lower extremity.    < end of copied text >    < from: Xray Chest 1 View-PORTABLE IMMEDIATE (Xray Chest 1 View-PORTABLE IMMEDIATE .) (01.12.25 @ 18:09) >    IMPRESSION:    Prominent bibasilar reticular interstitial opacities.    < end of copied text >

## 2025-01-15 NOTE — DIETITIAN INITIAL EVALUATION ADULT - PERTINENT LABORATORY DATA
01-15    141  |  107  |  12  ----------------------------<  113[H]  3.1[L]   |  22  |  0.9    Ca    7.8[L]      15 Marcos 2025 07:33  Mg     1.5     01-15

## 2025-01-15 NOTE — PROGRESS NOTE ADULT - SUBJECTIVE AND OBJECTIVE BOX
Patient is a 84y old  Female who presents with a chief complaint of weakness (15 Marcos 2025 10:04)      MEDICATIONS  (STANDING):  aztreonam  IVPB 2000 milliGRAM(s) IV Intermittent every 8 hours  enoxaparin Injectable 40 milliGRAM(s) SubCutaneous every 24 hours  furosemide    Tablet 20 milliGRAM(s) Oral daily  magnesium sulfate  IVPB 2 Gram(s) IV Intermittent every 6 hours  oseltamivir 75 milliGRAM(s) Oral two times a day  potassium chloride   Powder 40 milliEquivalent(s) Oral every 4 hours  sodium chloride 0.9%. 1000 milliLiter(s) (75 mL/Hr) IV Continuous <Continuous>    MEDICATIONS  (PRN):  acetaminophen     Tablet .. 650 milliGRAM(s) Oral every 6 hours PRN Temp greater or equal to 38C (100.4F), Mild Pain (1 - 3)  aluminum hydroxide/magnesium hydroxide/simethicone Suspension 30 milliLiter(s) Oral every 4 hours PRN Dyspepsia  loperamide 2 milliGRAM(s) Oral every 6 hours PRN Diarrhea  melatonin 3 milliGRAM(s) Oral at bedtime PRN Insomnia  ondansetron Injectable 4 milliGRAM(s) IV Push every 8 hours PRN Nausea and/or Vomiting      CAPILLARY BLOOD GLUCOSE  I&O's Summary      PHYSICAL EXAM:  Vital Signs Last 24 Hrs  T(C): 36.6 (15 Marcos 2025 05:26), Max: 37.3 (14 Jan 2025 21:03)  T(F): 97.8 (15 Marcos 2025 05:26), Max: 99.2 (14 Jan 2025 21:03)  HR: 80 (15 Marcos 2025 05:26) (73 - 80)  BP: 114/61 (15 Marcos 2025 05:26) (114/61 - 162/65)  BP(mean): --  RR: 16 (15 Marcos 2025 05:26) (16 - 18)  SpO2: 96% (15 Marcos 2025 05:26) (94% - 96%)        GENERAL: No acute distress, well-developed  HEAD:  Atraumatic, Normocephalic  EYES:  conjunctiva and sclera clear  NECK: Supple,  no JVD  CHEST/LUNG: CTAB; No wheezes, rales, or rhonchi  HEART: Regular rate and rhythm; No murmurs,   ABDOMEN: Soft, non-tender, non-distended;   EXTREMITIES: , No clubbing, cyanosis, or edema  NEUROLOGY: A&O x 3, no focal deficits  SKIN: No rashes or lesions    LABS:                        11.6   49.64 )-----------( 105      ( 15 Marcos 2025 07:33 )             36.0     01-15    141  |  107  |  12  ----------------------------<  113[H]  3.1[L]   |  22  |  0.9    Ca    7.8[L]      15 Marcos 2025 07:33  Mg     1.5     01-15        Urinalysis Basic - ( 15 Marcos 2025 07:33 )    Color: x / Appearance: x / SG: x / pH: x  Gluc: 113 mg/dL / Ketone: x  / Bili: x / Urobili: x   Blood: x / Protein: x / Nitrite: x   Leuk Esterase: x / RBC: x / WBC x   Sq Epi: x / Non Sq Epi: x / Bacteria: x      Urinalysis with Rflx Culture (collected 12 Jan 2025 20:41)    Culture - Urine (collected 12 Jan 2025 20:41)  Source: Clean Catch None  Final Report (14 Jan 2025 12:51):    >=3 organisms. Probable collection contamination.    Culture - Blood (collected 12 Jan 2025 17:15)  Source: .Blood BLOOD  Preliminary Report (14 Jan 2025 23:02):    No growth at 48 Hours    Culture - Blood (collected 12 Jan 2025 17:15)  Source: .Blood BLOOD  Preliminary Report (14 Jan 2025 23:02):    No growth at 48 Hours

## 2025-01-15 NOTE — DIETITIAN INITIAL EVALUATION ADULT - CALCULATED FROM (ML/KG)
----- Message from JONY Colón sent at 5/26/2019 10:06 AM CDT -----  Your wound culture was positive for staph aureus and is susceptible to the doxycycline previously prescribed. Please continue the antibiotic as prescribed. Please follow up with your primary care provider for re-evaluation of symptoms.   [id number]

## 2025-01-15 NOTE — DIETITIAN INITIAL EVALUATION ADULT - ORAL INTAKE PTA/DIET HISTORY
as per pt consumes a regular diet with good po intake PTA, NKFA for intolerances noted. pt states she has lost some weight PTA but is uncertain of amount. as per HEME/ONC note pt with 15# weight loss in last 2 months. EMR review has 59 kgs in 2023 vs 53.2 kgs today      presently on a regular diet tolerating well pt consumes 75% of dinner. outside foods also noted at bedside.

## 2025-01-15 NOTE — DIETITIAN INITIAL EVALUATION ADULT - OTHER INFO
pt is 84 year old female with hx of cervical CA diagnosed 10 years ago, pt has been non-compliant with medical care. p/w fall,  lethargy and diarrhea.  pt admitted with severe leukocytosis, + FLU, + UTI. CDIFF negative. as per heme/onc pt with high suspicion for CLL.

## 2025-01-16 LAB
ANION GAP SERPL CALC-SCNC: 10 MMOL/L — SIGNIFICANT CHANGE UP (ref 7–14)
BASOPHILS # BLD AUTO: 0.03 K/UL — SIGNIFICANT CHANGE UP (ref 0–0.2)
BASOPHILS NFR BLD AUTO: 0.1 % — SIGNIFICANT CHANGE UP (ref 0–1)
BUN SERPL-MCNC: 10 MG/DL — SIGNIFICANT CHANGE UP (ref 10–20)
CALCIUM SERPL-MCNC: 7.9 MG/DL — LOW (ref 8.4–10.5)
CHLORIDE SERPL-SCNC: 108 MMOL/L — SIGNIFICANT CHANGE UP (ref 98–110)
CO2 SERPL-SCNC: 23 MMOL/L — SIGNIFICANT CHANGE UP (ref 17–32)
CREAT SERPL-MCNC: 0.9 MG/DL — SIGNIFICANT CHANGE UP (ref 0.7–1.5)
EGFR: 64 ML/MIN/1.73M2 — SIGNIFICANT CHANGE UP
EOSINOPHIL # BLD AUTO: 0.12 K/UL — SIGNIFICANT CHANGE UP (ref 0–0.7)
EOSINOPHIL NFR BLD AUTO: 0.2 % — SIGNIFICANT CHANGE UP (ref 0–8)
GLUCOSE SERPL-MCNC: 93 MG/DL — SIGNIFICANT CHANGE UP (ref 70–99)
HCT VFR BLD CALC: 34.6 % — LOW (ref 37–47)
HGB BLD-MCNC: 11.6 G/DL — LOW (ref 12–16)
IMM GRANULOCYTES NFR BLD AUTO: 0.1 % — SIGNIFICANT CHANGE UP (ref 0.1–0.3)
LYMPHOCYTES # BLD AUTO: 44 K/UL — HIGH (ref 1.2–3.4)
LYMPHOCYTES # BLD AUTO: 89.9 % — HIGH (ref 20.5–51.1)
MAGNESIUM SERPL-MCNC: 2.2 MG/DL — SIGNIFICANT CHANGE UP (ref 1.8–2.4)
MCHC RBC-ENTMCNC: 30.6 PG — SIGNIFICANT CHANGE UP (ref 27–31)
MCHC RBC-ENTMCNC: 33.5 G/DL — SIGNIFICANT CHANGE UP (ref 32–37)
MCV RBC AUTO: 91.3 FL — SIGNIFICANT CHANGE UP (ref 81–99)
MONOCYTES # BLD AUTO: 1.99 K/UL — HIGH (ref 0.1–0.6)
MONOCYTES NFR BLD AUTO: 4.1 % — SIGNIFICANT CHANGE UP (ref 1.7–9.3)
NEUTROPHILS # BLD AUTO: 2.75 K/UL — SIGNIFICANT CHANGE UP (ref 1.4–6.5)
NEUTROPHILS NFR BLD AUTO: 5.6 % — LOW (ref 42.2–75.2)
NRBC # BLD: 0 /100 WBCS — SIGNIFICANT CHANGE UP (ref 0–0)
NRBC BLD-RTO: 0 /100 WBCS — SIGNIFICANT CHANGE UP (ref 0–0)
PLATELET # BLD AUTO: 104 K/UL — LOW (ref 130–400)
PMV BLD: 11.1 FL — HIGH (ref 7.4–10.4)
POTASSIUM SERPL-MCNC: 4.1 MMOL/L — SIGNIFICANT CHANGE UP (ref 3.5–5)
POTASSIUM SERPL-SCNC: 4.1 MMOL/L — SIGNIFICANT CHANGE UP (ref 3.5–5)
RBC # BLD: 3.79 M/UL — LOW (ref 4.2–5.4)
RBC # FLD: 13.6 % — SIGNIFICANT CHANGE UP (ref 11.5–14.5)
SODIUM SERPL-SCNC: 141 MMOL/L — SIGNIFICANT CHANGE UP (ref 135–146)
TM INTERPRETATION: SIGNIFICANT CHANGE UP
WBC # BLD: 48.94 K/UL — CRITICAL HIGH (ref 4.8–10.8)
WBC # FLD AUTO: 48.94 K/UL — CRITICAL HIGH (ref 4.8–10.8)

## 2025-01-16 PROCEDURE — 72170 X-RAY EXAM OF PELVIS: CPT | Mod: 26

## 2025-01-16 PROCEDURE — 76770 US EXAM ABDO BACK WALL COMP: CPT | Mod: 26

## 2025-01-16 PROCEDURE — 70450 CT HEAD/BRAIN W/O DYE: CPT | Mod: 26

## 2025-01-16 PROCEDURE — 99232 SBSQ HOSP IP/OBS MODERATE 35: CPT

## 2025-01-16 PROCEDURE — 74177 CT ABD & PELVIS W/CONTRAST: CPT | Mod: 26

## 2025-01-16 RX ORDER — IOHEXOL 350 MG/ML
30 INJECTION, SOLUTION INTRAVENOUS ONCE
Refills: 0 | Status: DISCONTINUED | OUTPATIENT
Start: 2025-01-16 | End: 2025-01-21

## 2025-01-16 RX ORDER — OSELTAMIVIR PHOSPHATE 75 MG/1
30 CAPSULE ORAL EVERY 12 HOURS
Refills: 0 | Status: COMPLETED | OUTPATIENT
Start: 2025-01-17 | End: 2025-01-17

## 2025-01-16 RX ADMIN — OSELTAMIVIR PHOSPHATE 75 MILLIGRAM(S): 75 CAPSULE ORAL at 05:22

## 2025-01-16 RX ADMIN — ANTISEPTIC SURGICAL SCRUB 1 APPLICATION(S): 0.04 SOLUTION TOPICAL at 05:23

## 2025-01-16 RX ADMIN — LOPERAMIDE HYDROCHLORIDE 2 MILLIGRAM(S): 2 CAPSULE ORAL at 05:25

## 2025-01-16 RX ADMIN — Medication 75 MILLILITER(S): at 05:29

## 2025-01-16 RX ADMIN — ENOXAPARIN SODIUM 40 MILLIGRAM(S): 100 INJECTION SUBCUTANEOUS at 05:22

## 2025-01-16 RX ADMIN — Medication 20 MILLIGRAM(S): at 05:22

## 2025-01-16 NOTE — PROGRESS NOTE ADULT - SUBJECTIVE AND OBJECTIVE BOX
Patient is a 84y old  Female who presents with a chief complaint of Influenza with other respiratory manifestations, other influenza virus identified        MEDICATIONS  (STANDING):  chlorhexidine 2% Cloths 1 Application(s) Topical <User Schedule>  enoxaparin Injectable 40 milliGRAM(s) SubCutaneous every 24 hours  furosemide    Tablet 20 milliGRAM(s) Oral daily  sodium chloride 0.9%. 1000 milliLiter(s) (75 mL/Hr) IV Continuous <Continuous>    MEDICATIONS  (PRN):  acetaminophen     Tablet .. 650 milliGRAM(s) Oral every 6 hours PRN Temp greater or equal to 38C (100.4F), Mild Pain (1 - 3)  aluminum hydroxide/magnesium hydroxide/simethicone Suspension 30 milliLiter(s) Oral every 4 hours PRN Dyspepsia  loperamide 2 milliGRAM(s) Oral every 6 hours PRN Diarrhea  melatonin 3 milliGRAM(s) Oral at bedtime PRN Insomnia  ondansetron Injectable 4 milliGRAM(s) IV Push every 8 hours PRN Nausea and/or Vomiting      CAPILLARY BLOOD GLUCOSE    I&O's Summary    15 Marcos 2025 07:01  -  16 Jan 2025 07:00  --------------------------------------------------------  IN: 0 mL / OUT: 850 mL / NET: -850 mL        PHYSICAL EXAM:  Vital Signs Last 24 Hrs  T(C): 36.7 (16 Jan 2025 10:50), Max: 37.1 (15 Marcos 2025 20:45)  T(F): 98.1 (16 Jan 2025 10:50), Max: 98.7 (15 Marcos 2025 20:45)  HR: 89 (16 Jan 2025 10:50) (74 - 89)  BP: 135/64 (16 Jan 2025 10:50) (135/64 - 152/70)  BP(mean): --  RR: 18 (16 Jan 2025 04:30) (18 - 18)  SpO2: 95% (16 Jan 2025 10:50) (94% - 95%)    Parameters below as of 16 Jan 2025 10:50  Patient On (Oxygen Delivery Method): room air        GENERAL: No acute distress, well-developed  HEAD:  Atraumatic, Normocephalic  EYES: conjunctiva and sclera clear  NECK: Supple, , no JVD  CHEST/LUNG: CTAB; No wheezes,   HEART: Regular rate and rhythm; No murmurs,  ABDOMEN: Soft, non-tender, non-distended;s  EXTREMITIES:  , No clubbing, cyanosis, or edema  GENITOURINARY: Gross hematuria in canister   NEUROLOGY: A&O x 3, no focal deficits  SKIN: No rashes or lesions    LABS:                        11.6   48.94 )-----------( 104      ( 16 Jan 2025 06:19 )             34.6     01-16    141  |  108  |  10  ----------------------------<  93  4.1   |  23  |  0.9    Ca    7.9[L]      16 Jan 2025 06:19  Mg     2.2     01-16        Urinalysis Basic - ( 16 Jan 2025 06:19 )    Color: x / Appearance: x / SG: x / pH: x  Gluc: 93 mg/dL / Ketone: x  / Bili: x / Urobili: x   Blood: x / Protein: x / Nitrite: x   Leuk Esterase: x / RBC: x / WBC x   Sq Epi: x / Non Sq Epi: x / Bacteria: x     Patient is a 84y old  Female who presents with a chief complaint of Influenza with other respiratory manifestations, other influenza virus identified        MEDICATIONS  (STANDING):  chlorhexidine 2% Cloths 1 Application(s) Topical <User Schedule>  enoxaparin Injectable 40 milliGRAM(s) SubCutaneous every 24 hours  furosemide    Tablet 20 milliGRAM(s) Oral daily  sodium chloride 0.9%. 1000 milliLiter(s) (75 mL/Hr) IV Continuous <Continuous>    MEDICATIONS  (PRN):  acetaminophen     Tablet .. 650 milliGRAM(s) Oral every 6 hours PRN Temp greater or equal to 38C (100.4F), Mild Pain (1 - 3)  aluminum hydroxide/magnesium hydroxide/simethicone Suspension 30 milliLiter(s) Oral every 4 hours PRN Dyspepsia  loperamide 2 milliGRAM(s) Oral every 6 hours PRN Diarrhea  melatonin 3 milliGRAM(s) Oral at bedtime PRN Insomnia  ondansetron Injectable 4 milliGRAM(s) IV Push every 8 hours PRN Nausea and/or Vomiting      CAPILLARY BLOOD GLUCOSE    I&O's Summary    15 Marcos 2025 07:01  -  16 Jan 2025 07:00  --------------------------------------------------------  IN: 0 mL / OUT: 850 mL / NET: -850 mL        PHYSICAL EXAM:  Vital Signs Last 24 Hrs  T(C): 36.7 (16 Jan 2025 10:50), Max: 37.1 (15 Marcos 2025 20:45)  T(F): 98.1 (16 Jan 2025 10:50), Max: 98.7 (15 Marcos 2025 20:45)  HR: 89 (16 Jan 2025 10:50) (74 - 89)  BP: 135/64 (16 Jan 2025 10:50) (135/64 - 152/70)  BP(mean): --  RR: 18 (16 Jan 2025 04:30) (18 - 18)  SpO2: 95% (16 Jan 2025 10:50) (94% - 95%)    Parameters below as of 16 Jan 2025 10:50  Patient On (Oxygen Delivery Method): room air        GENERAL: No acute distress, well-developed  HEAD:  Atraumatic, Normocephalic  EYES: conjunctiva and sclera clear  NECK: Supple, , no JVD  CHEST/LUNG: CTAB; No wheezes,   HEART: Regular rate and rhythm; No murmurs,  ABDOMEN: Soft, non-tender, non-distended  EXTREMITIES:  , No clubbing, cyanosis, or edema  GENITOURINARY: Gross hematuria in canister   NEUROLOGY: A&O x 3, no focal deficits  SKIN: No rashes or lesions    LABS:                        11.6   48.94 )-----------( 104      ( 16 Jan 2025 06:19 )             34.6     01-16    141  |  108  |  10  ----------------------------<  93  4.1   |  23  |  0.9    Ca    7.9[L]      16 Jan 2025 06:19  Mg     2.2     01-16        Urinalysis Basic - ( 16 Jan 2025 06:19 )    Color: x / Appearance: x / SG: x / pH: x  Gluc: 93 mg/dL / Ketone: x  / Bili: x / Urobili: x   Blood: x / Protein: x / Nitrite: x   Leuk Esterase: x / RBC: x / WBC x   Sq Epi: x / Non Sq Epi: x / Bacteria: x

## 2025-01-17 LAB
ALBUMIN SERPL ELPH-MCNC: 3.5 G/DL — SIGNIFICANT CHANGE UP (ref 3.5–5.2)
ALP SERPL-CCNC: 67 U/L — SIGNIFICANT CHANGE UP (ref 30–115)
ALT FLD-CCNC: 30 U/L — SIGNIFICANT CHANGE UP (ref 0–41)
ANION GAP SERPL CALC-SCNC: 12 MMOL/L — SIGNIFICANT CHANGE UP (ref 7–14)
AST SERPL-CCNC: 55 U/L — HIGH (ref 0–41)
BASOPHILS # BLD AUTO: 0.05 K/UL — SIGNIFICANT CHANGE UP (ref 0–0.2)
BASOPHILS NFR BLD AUTO: 0.1 % — SIGNIFICANT CHANGE UP (ref 0–1)
BILIRUB SERPL-MCNC: 0.3 MG/DL — SIGNIFICANT CHANGE UP (ref 0.2–1.2)
BLD GP AB SCN SERPL QL: SIGNIFICANT CHANGE UP
BUN SERPL-MCNC: 8 MG/DL — LOW (ref 10–20)
CALCIUM SERPL-MCNC: 8.6 MG/DL — SIGNIFICANT CHANGE UP (ref 8.4–10.5)
CHLORIDE SERPL-SCNC: 105 MMOL/L — SIGNIFICANT CHANGE UP (ref 98–110)
CO2 SERPL-SCNC: 24 MMOL/L — SIGNIFICANT CHANGE UP (ref 17–32)
CREAT SERPL-MCNC: 0.9 MG/DL — SIGNIFICANT CHANGE UP (ref 0.7–1.5)
CULTURE RESULTS: SIGNIFICANT CHANGE UP
CULTURE RESULTS: SIGNIFICANT CHANGE UP
EGFR: 64 ML/MIN/1.73M2 — SIGNIFICANT CHANGE UP
EOSINOPHIL # BLD AUTO: 0.15 K/UL — SIGNIFICANT CHANGE UP (ref 0–0.7)
EOSINOPHIL NFR BLD AUTO: 0.2 % — SIGNIFICANT CHANGE UP (ref 0–8)
GLUCOSE SERPL-MCNC: 157 MG/DL — HIGH (ref 70–99)
HCT VFR BLD CALC: 36.2 % — LOW (ref 37–47)
HGB BLD-MCNC: 12 G/DL — SIGNIFICANT CHANGE UP (ref 12–16)
IMM GRANULOCYTES NFR BLD AUTO: 0.2 % — SIGNIFICANT CHANGE UP (ref 0.1–0.3)
LYMPHOCYTES # BLD AUTO: 58.19 K/UL — HIGH (ref 1.2–3.4)
LYMPHOCYTES # BLD AUTO: 89.2 % — HIGH (ref 20.5–51.1)
MCHC RBC-ENTMCNC: 30.4 PG — SIGNIFICANT CHANGE UP (ref 27–31)
MCHC RBC-ENTMCNC: 33.1 G/DL — SIGNIFICANT CHANGE UP (ref 32–37)
MCV RBC AUTO: 91.6 FL — SIGNIFICANT CHANGE UP (ref 81–99)
MONOCYTES # BLD AUTO: 2.47 K/UL — HIGH (ref 0.1–0.6)
MONOCYTES NFR BLD AUTO: 3.8 % — SIGNIFICANT CHANGE UP (ref 1.7–9.3)
NEUTROPHILS # BLD AUTO: 4.24 K/UL — SIGNIFICANT CHANGE UP (ref 1.4–6.5)
NEUTROPHILS NFR BLD AUTO: 6.5 % — LOW (ref 42.2–75.2)
NRBC # BLD: 0 /100 WBCS — SIGNIFICANT CHANGE UP (ref 0–0)
NRBC BLD-RTO: 0 /100 WBCS — SIGNIFICANT CHANGE UP (ref 0–0)
PLATELET # BLD AUTO: 128 K/UL — LOW (ref 130–400)
PMV BLD: 10.8 FL — HIGH (ref 7.4–10.4)
POTASSIUM SERPL-MCNC: 3.4 MMOL/L — LOW (ref 3.5–5)
POTASSIUM SERPL-SCNC: 3.4 MMOL/L — LOW (ref 3.5–5)
PROT SERPL-MCNC: 5.3 G/DL — LOW (ref 6–8)
RBC # BLD: 3.95 M/UL — LOW (ref 4.2–5.4)
RBC # FLD: 13.5 % — SIGNIFICANT CHANGE UP (ref 11.5–14.5)
SODIUM SERPL-SCNC: 141 MMOL/L — SIGNIFICANT CHANGE UP (ref 135–146)
SPECIMEN SOURCE: SIGNIFICANT CHANGE UP
SPECIMEN SOURCE: SIGNIFICANT CHANGE UP
WBC # BLD: 65.21 K/UL — CRITICAL HIGH (ref 4.8–10.8)
WBC # FLD AUTO: 65.21 K/UL — CRITICAL HIGH (ref 4.8–10.8)

## 2025-01-17 PROCEDURE — 99233 SBSQ HOSP IP/OBS HIGH 50: CPT

## 2025-01-17 RX ORDER — POTASSIUM CHLORIDE 750 MG/1
40 TABLET, EXTENDED RELEASE ORAL ONCE
Refills: 0 | Status: COMPLETED | OUTPATIENT
Start: 2025-01-17 | End: 2025-01-17

## 2025-01-17 RX ADMIN — POTASSIUM CHLORIDE 40 MILLIEQUIVALENT(S): 750 TABLET, EXTENDED RELEASE ORAL at 16:44

## 2025-01-17 RX ADMIN — ANTISEPTIC SURGICAL SCRUB 1 APPLICATION(S): 0.04 SOLUTION TOPICAL at 05:24

## 2025-01-17 RX ADMIN — LOPERAMIDE HYDROCHLORIDE 2 MILLIGRAM(S): 2 CAPSULE ORAL at 10:29

## 2025-01-17 RX ADMIN — OSELTAMIVIR PHOSPHATE 30 MILLIGRAM(S): 75 CAPSULE ORAL at 17:03

## 2025-01-17 RX ADMIN — OSELTAMIVIR PHOSPHATE 30 MILLIGRAM(S): 75 CAPSULE ORAL at 05:03

## 2025-01-17 RX ADMIN — Medication 75 MILLILITER(S): at 05:06

## 2025-01-17 RX ADMIN — Medication 20 MILLIGRAM(S): at 05:04

## 2025-01-17 NOTE — PROGRESS NOTE ADULT - SUBJECTIVE AND OBJECTIVE BOX
LENGTH OF HOSPITAL STAY: 5d    CHIEF COMPLAINT:    Patient is a 84y old  Female who presents with a chief complaint of weakness (16 Jan 2025 14:30)      OVERNIGHT EVENTS:    - no overnight events  - pt examined at bedside, in great spirits; updated, answered questions  - tolerating PO, having BMs, making urine without urinary sxs (still with some hematuria)    HPI:    Patient is an 84 year old female with a history of cervical cancer who presents to the ED after being found on floor by family today. Patient reports increased weakness and lethargy since yesterday. She fell, hit her head, and could not get up, but does not remember what time she fell. Her sisters found her on the floor this morning. Last known contact with patient was last night. Patient states that she has had a cough and congestion for the past week. Patient lives alone and walks with walker. She also refers to urinary incontinence and burning sensation with urination. Patient is noncompliant with medical care and has not seen a doctor in several years. She denies chest pain, SOB, fever, chills, dizziness, nausea, vomiting and abdominal pain. Patient now with diarrhea since receiving magnesium supplementation.  (12 Jan 2025 21:46)      ALLERGIES:    ampicillin (Rash)  levofloxacin (Rash)      PMHx:    Arthritis  Cervical cancer    SOCIAL Hx:    former smoker denies etoh and recreational drug use  lives in 2nd floor apartment has walker lives by self  no pets    MEDICATIONS:  STANDING MEDICATIONS  chlorhexidine 2% Cloths 1 Application(s) Topical <User Schedule>  enoxaparin Injectable 40 milliGRAM(s) SubCutaneous every 24 hours  furosemide    Tablet 20 milliGRAM(s) Oral daily  iohexol 300 mG (iodine)/mL Oral Solution 30 milliLiter(s) Oral once  oseltamivir 30 milliGRAM(s) Oral every 12 hours  sodium chloride 0.9%. 1000 milliLiter(s) IV Continuous <Continuous>    PRN MEDICATIONS  acetaminophen     Tablet .. 650 milliGRAM(s) Oral every 6 hours PRN  aluminum hydroxide/magnesium hydroxide/simethicone Suspension 30 milliLiter(s) Oral every 4 hours PRN  loperamide 2 milliGRAM(s) Oral every 6 hours PRN  melatonin 3 milliGRAM(s) Oral at bedtime PRN  ondansetron Injectable 4 milliGRAM(s) IV Push every 8 hours PRN      LABS:                        12.0   65.21 )-----------( 128      ( 17 Jan 2025 04:30 )             36.2     01-17    141  |  105  |  8[L]  ----------------------------<  157[H]  3.4[L]   |  24  |  0.9    Ca    8.6      17 Jan 2025 04:30  Mg     2.2     01-16    TPro  5.3[L]  /  Alb  3.5  /  TBili  0.3  /  DBili  x   /  AST  55[H]  /  ALT  30  /  AlkPhos  67  01-17      Urinalysis Basic - ( 17 Jan 2025 04:30 )    Color: x / Appearance: x / SG: x / pH: x  Gluc: 157 mg/dL / Ketone: x  / Bili: x / Urobili: x   Blood: x / Protein: x / Nitrite: x   Leuk Esterase: x / RBC: x / WBC x   Sq Epi: x / Non Sq Epi: x / Bacteria: x      VITALS:   T(F): 99.3  HR: 85  BP: 115/58  RR: 16  SpO2: 96%                               PHYSICAL EXAM:    Gen: NAD, resting in bed, thin  HEENT: Normocephalic, atraumatic  Neck: supple, no lymphadenopathy  CV: Regular rate & regular rhythm  Lungs: decreased BS at bases, no fremitus  Abdomen: Soft, BS present  Ext: Warm, well perfused  Neuro: moves all extremities against resistance, no droop, awake  Skin: no rash, no erythema

## 2025-01-18 LAB
ANION GAP SERPL CALC-SCNC: 9 MMOL/L — SIGNIFICANT CHANGE UP (ref 7–14)
APPEARANCE UR: ABNORMAL
BACTERIA # UR AUTO: ABNORMAL /HPF
BASOPHILS # BLD AUTO: 0.08 K/UL — SIGNIFICANT CHANGE UP (ref 0–0.2)
BASOPHILS NFR BLD AUTO: 0.1 % — SIGNIFICANT CHANGE UP (ref 0–1)
BILIRUB UR-MCNC: NEGATIVE — SIGNIFICANT CHANGE UP
BUN SERPL-MCNC: 9 MG/DL — LOW (ref 10–20)
CALCIUM SERPL-MCNC: 8.5 MG/DL — SIGNIFICANT CHANGE UP (ref 8.4–10.5)
CHLORIDE SERPL-SCNC: 107 MMOL/L — SIGNIFICANT CHANGE UP (ref 98–110)
CO2 SERPL-SCNC: 25 MMOL/L — SIGNIFICANT CHANGE UP (ref 17–32)
COD CRY URNS QL: PRESENT
COLOR SPEC: ABNORMAL
CREAT SERPL-MCNC: 0.8 MG/DL — SIGNIFICANT CHANGE UP (ref 0.7–1.5)
DIFF PNL FLD: ABNORMAL
EGFR: 74 ML/MIN/1.73M2 — SIGNIFICANT CHANGE UP
EOSINOPHIL # BLD AUTO: 0.17 K/UL — SIGNIFICANT CHANGE UP (ref 0–0.7)
EOSINOPHIL NFR BLD AUTO: 0.3 % — SIGNIFICANT CHANGE UP (ref 0–8)
EPI CELLS # UR: PRESENT
GLUCOSE SERPL-MCNC: 90 MG/DL — SIGNIFICANT CHANGE UP (ref 70–99)
GLUCOSE UR QL: NEGATIVE MG/DL — SIGNIFICANT CHANGE UP
HCT VFR BLD CALC: 35.5 % — LOW (ref 37–47)
HGB BLD-MCNC: 11.8 G/DL — LOW (ref 12–16)
IMM GRANULOCYTES NFR BLD AUTO: 0.2 % — SIGNIFICANT CHANGE UP (ref 0.1–0.3)
KETONES UR-MCNC: NEGATIVE MG/DL — SIGNIFICANT CHANGE UP
LEUKOCYTE ESTERASE UR-ACNC: ABNORMAL
LYMPHOCYTES # BLD AUTO: 49.34 K/UL — HIGH (ref 1.2–3.4)
LYMPHOCYTES # BLD AUTO: 90.3 % — HIGH (ref 20.5–51.1)
MAGNESIUM SERPL-MCNC: 1.6 MG/DL — LOW (ref 1.8–2.4)
MCHC RBC-ENTMCNC: 30.8 PG — SIGNIFICANT CHANGE UP (ref 27–31)
MCHC RBC-ENTMCNC: 33.2 G/DL — SIGNIFICANT CHANGE UP (ref 32–37)
MCV RBC AUTO: 92.7 FL — SIGNIFICANT CHANGE UP (ref 81–99)
MONOCYTES # BLD AUTO: 1.59 K/UL — HIGH (ref 0.1–0.6)
MONOCYTES NFR BLD AUTO: 2.9 % — SIGNIFICANT CHANGE UP (ref 1.7–9.3)
NEUTROPHILS # BLD AUTO: 3.38 K/UL — SIGNIFICANT CHANGE UP (ref 1.4–6.5)
NEUTROPHILS NFR BLD AUTO: 6.2 % — LOW (ref 42.2–75.2)
NITRITE UR-MCNC: NEGATIVE — SIGNIFICANT CHANGE UP
NRBC # BLD: 0 /100 WBCS — SIGNIFICANT CHANGE UP (ref 0–0)
NRBC BLD-RTO: 0 /100 WBCS — SIGNIFICANT CHANGE UP (ref 0–0)
PH UR: 5.5 — SIGNIFICANT CHANGE UP (ref 5–8)
PLATELET # BLD AUTO: 108 K/UL — LOW (ref 130–400)
PMV BLD: 11.7 FL — HIGH (ref 7.4–10.4)
POTASSIUM SERPL-MCNC: 4.5 MMOL/L — SIGNIFICANT CHANGE UP (ref 3.5–5)
POTASSIUM SERPL-SCNC: 4.5 MMOL/L — SIGNIFICANT CHANGE UP (ref 3.5–5)
PROT UR-MCNC: 100 MG/DL
RBC # BLD: 3.83 M/UL — LOW (ref 4.2–5.4)
RBC # FLD: 13.3 % — SIGNIFICANT CHANGE UP (ref 11.5–14.5)
RBC CASTS # UR COMP ASSIST: ABNORMAL /HPF
SODIUM SERPL-SCNC: 141 MMOL/L — SIGNIFICANT CHANGE UP (ref 135–146)
SP GR SPEC: 1.02 — SIGNIFICANT CHANGE UP (ref 1–1.03)
UROBILINOGEN FLD QL: 1 MG/DL — SIGNIFICANT CHANGE UP (ref 0.2–1)
WBC # BLD: 54.65 K/UL — CRITICAL HIGH (ref 4.8–10.8)
WBC # FLD AUTO: 54.65 K/UL — CRITICAL HIGH (ref 4.8–10.8)
WBC UR QL: 30 /HPF — HIGH (ref 0–5)

## 2025-01-18 PROCEDURE — 99233 SBSQ HOSP IP/OBS HIGH 50: CPT

## 2025-01-18 RX ORDER — BACTERIOSTATIC SODIUM CHLORIDE 0.9 %
1000 VIAL (ML) INJECTION
Refills: 0 | Status: DISCONTINUED | OUTPATIENT
Start: 2025-01-18 | End: 2025-01-20

## 2025-01-18 RX ORDER — LOPERAMIDE HYDROCHLORIDE 2 MG/1
2 CAPSULE ORAL EVERY 4 HOURS
Refills: 0 | Status: DISCONTINUED | OUTPATIENT
Start: 2025-01-18 | End: 2025-01-19

## 2025-01-18 RX ORDER — MAGNESIUM SULFATE 0.8 MEQ/ML
2 AMPUL (ML) INJECTION
Refills: 0 | Status: COMPLETED | OUTPATIENT
Start: 2025-01-18 | End: 2025-01-18

## 2025-01-18 RX ORDER — LOPERAMIDE HYDROCHLORIDE 2 MG/1
2 CAPSULE ORAL EVERY 6 HOURS
Refills: 0 | Status: DISCONTINUED | OUTPATIENT
Start: 2025-01-18 | End: 2025-01-19

## 2025-01-18 RX ADMIN — LOPERAMIDE HYDROCHLORIDE 2 MILLIGRAM(S): 2 CAPSULE ORAL at 12:02

## 2025-01-18 RX ADMIN — Medication 20 MILLIGRAM(S): at 05:45

## 2025-01-18 RX ADMIN — Medication 25 GRAM(S): at 20:39

## 2025-01-18 RX ADMIN — ANTISEPTIC SURGICAL SCRUB 1 APPLICATION(S): 0.04 SOLUTION TOPICAL at 05:50

## 2025-01-18 RX ADMIN — ENOXAPARIN SODIUM 40 MILLIGRAM(S): 100 INJECTION SUBCUTANEOUS at 05:45

## 2025-01-18 RX ADMIN — Medication 25 GRAM(S): at 18:30

## 2025-01-18 RX ADMIN — LOPERAMIDE HYDROCHLORIDE 2 MILLIGRAM(S): 2 CAPSULE ORAL at 17:36

## 2025-01-18 RX ADMIN — Medication 25 GRAM(S): at 17:35

## 2025-01-18 NOTE — PROGRESS NOTE ADULT - SUBJECTIVE AND OBJECTIVE BOX
LENGTH OF HOSPITAL STAY: 5d    CHIEF COMPLAINT:    Patient is a 84y old  Female who presents with a chief complaint of weakness (16 Jan 2025 14:30)      OVERNIGHT EVENTS:    - no overnight events  - pt examined at bedside, updated, answered questions  - tolerating PO, having significant, making urine without urinary sxs (still with some hematuria)    HPI:    Patient is an 84 year old female with a history of cervical cancer who presents to the ED after being found on floor by family today. Patient reports increased weakness and lethargy since yesterday. She fell, hit her head, and could not get up, but does not remember what time she fell. Her sisters found her on the floor this morning. Last known contact with patient was last night. Patient states that she has had a cough and congestion for the past week. Patient lives alone and walks with walker. She also refers to urinary incontinence and burning sensation with urination. Patient is noncompliant with medical care and has not seen a doctor in several years. She denies chest pain, SOB, fever, chills, dizziness, nausea, vomiting and abdominal pain. Patient now with diarrhea since receiving magnesium supplementation.  (12 Jan 2025 21:46)      ALLERGIES:    ampicillin (Rash)  levofloxacin (Rash)      PMHx:    Arthritis  Cervical cancer    SOCIAL Hx:    former smoker denies etoh and recreational drug use  lives in 2nd floor apartment has walker lives by self  no pets    MEDICATIONS:  STANDING MEDICATIONS  chlorhexidine 2% Cloths 1 Application(s) Topical <User Schedule>  enoxaparin Injectable 40 milliGRAM(s) SubCutaneous every 24 hours  furosemide    Tablet 20 milliGRAM(s) Oral daily  iohexol 300 mG (iodine)/mL Oral Solution 30 milliLiter(s) Oral once  loperamide 2 milliGRAM(s) Oral every 4 hours  magnesium sulfate  IVPB 2 Gram(s) IV Intermittent every 2 hours    PRN MEDICATIONS  acetaminophen     Tablet .. 650 milliGRAM(s) Oral every 6 hours PRN  aluminum hydroxide/magnesium hydroxide/simethicone Suspension 30 milliLiter(s) Oral every 4 hours PRN  loperamide 2 milliGRAM(s) Oral every 6 hours PRN  melatonin 3 milliGRAM(s) Oral at bedtime PRN  ondansetron Injectable 4 milliGRAM(s) IV Push every 8 hours PRN      LABS:                        11.8   54.65 )-----------( 108      ( 18 Jan 2025 07:53 )             35.5     01-18    141  |  107  |  9[L]  ----------------------------<  90  4.5   |  25  |  0.8    Ca    8.5      18 Jan 2025 07:53  Mg     1.6     01-18    TPro  5.3[L]  /  Alb  3.5  /  TBili  0.3  /  DBili  x   /  AST  55[H]  /  ALT  30  /  AlkPhos  67  01-17      Urinalysis Basic - ( 18 Jan 2025 07:53 )    Color: x / Appearance: x / SG: x / pH: x  Gluc: 90 mg/dL / Ketone: x  / Bili: x / Urobili: x   Blood: x / Protein: x / Nitrite: x   Leuk Esterase: x / RBC: x / WBC x   Sq Epi: x / Non Sq Epi: x / Bacteria: x      VITALS:   T(F): 99  HR: 51  BP: 78/40  RR: 16  SpO2: 96%        PHYSICAL EXAM:    Gen: NAD, resting in bed, thin  HEENT: Normocephalic, atraumatic  Neck: supple, no lymphadenopathy  CV: Regular rate & regular rhythm  Lungs: decreased BS at bases, no fremitus  Abdomen: Soft, BS present  Ext: Warm, well perfused  Neuro: moves all extremities against resistance, no droop, awake  Skin: no rash, no erythema

## 2025-01-19 LAB
ANION GAP SERPL CALC-SCNC: 12 MMOL/L — SIGNIFICANT CHANGE UP (ref 7–14)
BASOPHILS # BLD AUTO: 0 K/UL — SIGNIFICANT CHANGE UP (ref 0–0.2)
BASOPHILS NFR BLD AUTO: 0 % — SIGNIFICANT CHANGE UP (ref 0–1)
BUN SERPL-MCNC: 9 MG/DL — LOW (ref 10–20)
CALCIUM SERPL-MCNC: 8.6 MG/DL — SIGNIFICANT CHANGE UP (ref 8.4–10.5)
CHLORIDE SERPL-SCNC: 103 MMOL/L — SIGNIFICANT CHANGE UP (ref 98–110)
CO2 SERPL-SCNC: 25 MMOL/L — SIGNIFICANT CHANGE UP (ref 17–32)
CREAT SERPL-MCNC: 0.8 MG/DL — SIGNIFICANT CHANGE UP (ref 0.7–1.5)
EGFR: 74 ML/MIN/1.73M2 — SIGNIFICANT CHANGE UP
EOSINOPHIL # BLD AUTO: 0.5 K/UL — SIGNIFICANT CHANGE UP (ref 0–0.7)
EOSINOPHIL NFR BLD AUTO: 1 % — SIGNIFICANT CHANGE UP (ref 0–8)
GLUCOSE SERPL-MCNC: 90 MG/DL — SIGNIFICANT CHANGE UP (ref 70–99)
HCT VFR BLD CALC: 35.6 % — LOW (ref 37–47)
HGB BLD-MCNC: 11.5 G/DL — LOW (ref 12–16)
LYMPHOCYTES # BLD AUTO: 38.84 K/UL — HIGH (ref 1.2–3.4)
LYMPHOCYTES # BLD AUTO: 78 % — HIGH (ref 20.5–51.1)
MAGNESIUM SERPL-MCNC: 2.3 MG/DL — SIGNIFICANT CHANGE UP (ref 1.8–2.4)
MANUAL SMEAR VERIFICATION: SIGNIFICANT CHANGE UP
MCHC RBC-ENTMCNC: 30.2 PG — SIGNIFICANT CHANGE UP (ref 27–31)
MCHC RBC-ENTMCNC: 32.3 G/DL — SIGNIFICANT CHANGE UP (ref 32–37)
MCV RBC AUTO: 93.4 FL — SIGNIFICANT CHANGE UP (ref 81–99)
MONOCYTES # BLD AUTO: 2.49 K/UL — HIGH (ref 0.1–0.6)
MONOCYTES NFR BLD AUTO: 5 % — SIGNIFICANT CHANGE UP (ref 1.7–9.3)
NEUTROPHILS # BLD AUTO: 7.97 K/UL — HIGH (ref 1.4–6.5)
NEUTROPHILS NFR BLD AUTO: 14 % — LOW (ref 42.2–75.2)
NEUTS BAND # BLD: 2 % — SIGNIFICANT CHANGE UP (ref 0–6)
NEUTS BAND NFR BLD: 2 % — SIGNIFICANT CHANGE UP (ref 0–6)
NRBC # BLD: 0 /100 WBCS — SIGNIFICANT CHANGE UP (ref 0–0)
NRBC # BLD: SIGNIFICANT CHANGE UP /100 WBCS (ref 0–0)
NRBC BLD-RTO: 0 /100 WBCS — SIGNIFICANT CHANGE UP (ref 0–0)
NRBC BLD-RTO: SIGNIFICANT CHANGE UP /100 WBCS (ref 0–0)
OVALOCYTES BLD QL SMEAR: SLIGHT — SIGNIFICANT CHANGE UP
PHOSPHATE SERPL-MCNC: 3 MG/DL — SIGNIFICANT CHANGE UP (ref 2.1–4.9)
PLAT MORPH BLD: NORMAL — SIGNIFICANT CHANGE UP
PLATELET # BLD AUTO: 131 K/UL — SIGNIFICANT CHANGE UP (ref 130–400)
PLATELET COUNT - ESTIMATE: NORMAL — SIGNIFICANT CHANGE UP
PMV BLD: 11.3 FL — HIGH (ref 7.4–10.4)
POTASSIUM SERPL-MCNC: 3.8 MMOL/L — SIGNIFICANT CHANGE UP (ref 3.5–5)
POTASSIUM SERPL-SCNC: 3.8 MMOL/L — SIGNIFICANT CHANGE UP (ref 3.5–5)
RBC # BLD: 3.81 M/UL — LOW (ref 4.2–5.4)
RBC # FLD: 13.3 % — SIGNIFICANT CHANGE UP (ref 11.5–14.5)
RBC BLD AUTO: NORMAL — SIGNIFICANT CHANGE UP
SMUDGE CELLS # BLD: PRESENT — SIGNIFICANT CHANGE UP
SODIUM SERPL-SCNC: 140 MMOL/L — SIGNIFICANT CHANGE UP (ref 135–146)
TOXIC GRANULES BLD QL SMEAR: PRESENT — SIGNIFICANT CHANGE UP
WBC # BLD: 49.8 K/UL — CRITICAL HIGH (ref 4.8–10.8)
WBC # FLD AUTO: 49.8 K/UL — CRITICAL HIGH (ref 4.8–10.8)

## 2025-01-19 PROCEDURE — 99233 SBSQ HOSP IP/OBS HIGH 50: CPT

## 2025-01-19 RX ORDER — LOPERAMIDE HYDROCHLORIDE 2 MG/1
2 CAPSULE ORAL EVERY 6 HOURS
Refills: 0 | Status: DISCONTINUED | OUTPATIENT
Start: 2025-01-19 | End: 2025-01-22

## 2025-01-19 RX ORDER — LOPERAMIDE HYDROCHLORIDE 2 MG/1
2 CAPSULE ORAL
Refills: 0 | Status: COMPLETED | OUTPATIENT
Start: 2025-01-19 | End: 2025-01-22

## 2025-01-19 RX ADMIN — LOPERAMIDE HYDROCHLORIDE 2 MILLIGRAM(S): 2 CAPSULE ORAL at 10:46

## 2025-01-19 RX ADMIN — ENOXAPARIN SODIUM 40 MILLIGRAM(S): 100 INJECTION SUBCUTANEOUS at 05:16

## 2025-01-19 RX ADMIN — Medication 50 MILLILITER(S): at 17:43

## 2025-01-19 RX ADMIN — LOPERAMIDE HYDROCHLORIDE 2 MILLIGRAM(S): 2 CAPSULE ORAL at 17:42

## 2025-01-19 RX ADMIN — LOPERAMIDE HYDROCHLORIDE 2 MILLIGRAM(S): 2 CAPSULE ORAL at 21:22

## 2025-01-19 RX ADMIN — LOPERAMIDE HYDROCHLORIDE 2 MILLIGRAM(S): 2 CAPSULE ORAL at 23:48

## 2025-01-19 RX ADMIN — LOPERAMIDE HYDROCHLORIDE 2 MILLIGRAM(S): 2 CAPSULE ORAL at 02:50

## 2025-01-19 RX ADMIN — LOPERAMIDE HYDROCHLORIDE 2 MILLIGRAM(S): 2 CAPSULE ORAL at 19:19

## 2025-01-19 RX ADMIN — Medication 20 MILLIGRAM(S): at 05:17

## 2025-01-19 RX ADMIN — LOPERAMIDE HYDROCHLORIDE 2 MILLIGRAM(S): 2 CAPSULE ORAL at 05:16

## 2025-01-19 RX ADMIN — ANTISEPTIC SURGICAL SCRUB 1 APPLICATION(S): 0.04 SOLUTION TOPICAL at 05:45

## 2025-01-19 NOTE — PROGRESS NOTE ADULT - SUBJECTIVE AND OBJECTIVE BOX
LENGTH OF HOSPITAL STAY: 7d    CHIEF COMPLAINT:    Patient is a 84y old  Female who presents with a chief complaint of weakness (16 Jan 2025 14:30)      OVERNIGHT EVENTS:    - pt w/ episode of diarrhea this AM  - pt examined at bedside, updated, answered questions  - tolerating PO, significantly reduced # of BMs, making urine without urinary sxs (still with some hematuria)    HPI:    Patient is an 84 year old female with a history of cervical cancer who presents to the ED after being found on floor by family today. Patient reports increased weakness and lethargy since yesterday. She fell, hit her head, and could not get up, but does not remember what time she fell. Her sisters found her on the floor this morning. Last known contact with patient was last night. Patient states that she has had a cough and congestion for the past week. Patient lives alone and walks with walker. She also refers to urinary incontinence and burning sensation with urination. Patient is noncompliant with medical care and has not seen a doctor in several years. She denies chest pain, SOB, fever, chills, dizziness, nausea, vomiting and abdominal pain. Patient now with diarrhea since receiving magnesium supplementation.  (12 Jan 2025 21:46)      ALLERGIES:    ampicillin (Rash)  levofloxacin (Rash)      PMHx:    Arthritis  Cervical cancer    SOCIAL Hx:    former smoker denies etoh and recreational drug use  lives in 2nd floor apartment has walker lives by self  no pets    MEDICATIONS:  STANDING MEDICATIONS  chlorhexidine 2% Cloths 1 Application(s) Topical <User Schedule>  enoxaparin Injectable 40 milliGRAM(s) SubCutaneous every 24 hours  furosemide    Tablet 20 milliGRAM(s) Oral daily  iohexol 300 mG (iodine)/mL Oral Solution 30 milliLiter(s) Oral once  loperamide 2 milliGRAM(s) Oral every 4 hours  sodium chloride 0.9%. 1000 milliLiter(s) IV Continuous <Continuous>    PRN MEDICATIONS  acetaminophen     Tablet .. 650 milliGRAM(s) Oral every 6 hours PRN  aluminum hydroxide/magnesium hydroxide/simethicone Suspension 30 milliLiter(s) Oral every 4 hours PRN  loperamide 2 milliGRAM(s) Oral every 6 hours PRN  melatonin 3 milliGRAM(s) Oral at bedtime PRN  ondansetron Injectable 4 milliGRAM(s) IV Push every 8 hours PRN      LABS:                        11.5   49.80 )-----------( 131      ( 19 Jan 2025 06:24 )             35.6     01-19    140  |  103  |  9[L]  ----------------------------<  90  3.8   |  25  |  0.8    Ca    8.6      19 Jan 2025 06:24  Phos  3.0     01-19  Mg     2.3     01-19        Urinalysis Basic - ( 19 Jan 2025 06:24 )    Color: x / Appearance: x / SG: x / pH: x  Gluc: 90 mg/dL / Ketone: x  / Bili: x / Urobili: x   Blood: x / Protein: x / Nitrite: x   Leuk Esterase: x / RBC: x / WBC x   Sq Epi: x / Non Sq Epi: x / Bacteria: x      VITALS:   T(F): 98.3  HR: 83  BP: 127/64  RR: 16  SpO2: 95%          PHYSICAL EXAM:    Gen: NAD, resting in bed, thin  HEENT: Normocephalic, atraumatic  Neck: supple, no lymphadenopathy  CV: Regular rate & regular rhythm  Lungs: decreased BS at bases, no fremitus  Abdomen: Soft, BS present  Ext: Warm, well perfused  Neuro: moves all extremities against resistance, no droop, awake  Skin: no rash, no erythema

## 2025-01-19 NOTE — CONSULT NOTE ADULT - SUBJECTIVE AND OBJECTIVE BOX
84 year old female with a history of cervical cancer who presents to the ED after being found on floor by family today. Patient reports increased weakness and lethargy since yesterday. She fell, hit her head, and could not get up, but does not remember what time she fell. Her sisters found her on the floor this morning. Last known contact with patient was last night. Patient states that she has had a cough and congestion for the past week. Patient lives alone and walks with walker. She also refers to urinary incontinence and burning sensation with urination. Patient is noncompliant with medical care and has not seen a doctor in several years. She denies chest pain, SOB, fever, chills, dizziness, nausea, vomiting and abdominal pain. Patient now with diarrhea since receiving magnesium supplementation.  (12 Jan 2025 21:46)      ALLERGIES:    ampicillin (Rash)  levofloxacin (Rash)      PMHx:    Arthritis  Cervical cancer    SOCIAL Hx:    former smoker denies etoh and recreational drug use  lives in 2nd floor apartment has walker lives by self  no pets    MEDICATIONS:  STANDING MEDICATIONS  chlorhexidine 2% Cloths 1 Application(s) Topical <User Schedule>  enoxaparin Injectable 40 milliGRAM(s) SubCutaneous every 24 hours  furosemide    Tablet 20 milliGRAM(s) Oral daily  iohexol 300 mG (iodine)/mL Oral Solution 30 milliLiter(s) Oral once  loperamide 2 milliGRAM(s) Oral every 4 hours  sodium chloride 0.9%. 1000 milliLiter(s) IV Continuous <Continuous>    PRN MEDICATIONS  acetaminophen     Tablet .. 650 milliGRAM(s) Oral every 6 hours PRN  aluminum hydroxide/magnesium hydroxide/simethicone Suspension 30 milliLiter(s) Oral every 4 hours PRN  loperamide 2 milliGRAM(s) Oral every 6 hours PRN  melatonin 3 milliGRAM(s) Oral at bedtime PRN  ondansetron Injectable 4 milliGRAM(s) IV Push every 8 hours PRN    VITALS:   T(F): 98.3  HR: 83  BP: 127/64  RR: 16  SpO2: 95%    PHYSICAL EXAM:  Gen: NAD, resting in bed, thin  HEENT: Normocephalic, atraumatic  Neck: supple, no lymphadenopathy  CV: Regular rate & regular rhythm  Lungs: decreased BS at bases, no fremitus  Abdomen: Soft, BS present  Ext: Warm, well perfused  Neuro: moves all extremities against resistance, no droop, awake  Skin: no rash, no erythema    LABS:                        11.5   49.80 )-----------( 131      ( 19 Jan 2025 06:24 )             35.6     01-19    140  |  103  |  9[L]  ----------------------------<  90  3.8   |  25  |  0.8    Ca    8.6      19 Jan 2025 06:24  Phos  3.0     01-19  Mg     2.3     01-19        Urinalysis Basic - ( 19 Jan 2025 06:24 )    Color: x / Appearance: x / SG: x / pH: x  Gluc: 90 mg/dL / Ketone: x  / Bili: x / Urobili: x   Blood: x / Protein: x / Nitrite: x   Leuk Esterase: x / RBC: x / WBC x   Sq Epi: x / Non Sq Epi: x / Bacteria: x  ACC: [id number] EXAM:  CT ABDOMEN AND PELVIS IC   ORDERED BY: TAE LEWIS     PROCEDURE DATE:  01/16/2025          INTERPRETATION:  CLINICAL STATEMENT: Gross hematuria    TECHNIQUE: Contiguous axial CT images were obtained from the lower chest   to the pubic symphysis following administration of 95 cc Omnipaque 350   intravenous contrast.  Oral contrast was not administered.  Reformatted   images in the coronal and sagittal planes were acquired. 5 cc contrast   discarded.    COMPARISON: CT abdomen and pelvis 1/21/2023.    FINDINGS:    LOWER CHEST: Right lower lobe consolidative opacity, which may represent   pneumonia in the appropriate clinical setting. Trace right pleural   effusion.    HEPATOBILIARY: Post cholecystectomy. No significant biliary dilation.   Liver unremarkable.    SPLEEN: Splenic subcentimeter hypodensity, too small to characterize,   likely cyst or hemangioma.    PANCREAS: Unremarkable.    ADRENAL GLANDS: Unremarkable.    KIDNEYS: Mild right hydroureteronephrosis,without obstructing calculus.   Unchanged atrophic left kidney    ABDOMINOPELVIC NODES: Unremarkable.    PELVIC ORGANS: Diffuse urinary bladder wall mural enhancement, pericystic   inflammatory stranding. Post hysterectomy.    PERITONEUM/MESENTERY/BOWEL:Postsurgical change at cecum, may correspond   to appendectomy, unchanged. No ascites. No bowel obstruction.    BONES/SOFT TISSUES: Degenerative change of spine. Mild anterolisthesis of   L4 on L5..    VASCULAR: Unremarkable.    OTHER: Unremarkable.    IMPRESSION:    Findings compatible with urinary bladder infection, cystitis. Mild right   hydroureteronephrosis, without obstructing calculus; findings may be   attributed to right ascending urinary tract infection.    Right lower lobe consolidative opacity, which may represent pneumonia in   the appropriate clinical setting    --- End of Report ---    CASSY FERNANDEZ MD; Attending Radiologist  This document has been electronically signed. Jan 16 2025 10:17PM          Assessment and Plan:   · Assessment	  84 year old female with a history of cervical cancer presenting for lethargy and fall found on floor by family. Admitted for Flu and UTI.      Gross hematuria noted in canister   -Renal bladder sono with Mild-mod right hydronephrosis   - CT A/P with IV contrast  - hydroeureter and consolidation in lung, no stone  - suggestive of ascending cystitis / pyelo        84 year old female with a history of cervical cancer who presents to the ED after being found on floor by family today. Patient reports increased weakness and lethargy since yesterday. She fell, hit her head, and could not get up, but does not remember what time she fell. Her sisters found her on the floor this morning. Last known contact with patient was last night. Patient states that she has had a cough and congestion for the past week. Patient lives alone and walks with walker. She also refers to urinary incontinence and burning sensation with urination. Patient is noncompliant with medical care and has not seen a doctor in several years. She denies chest pain, SOB, fever, chills, dizziness, nausea, vomiting and abdominal pain. Patient now with diarrhea since receiving magnesium supplementation.  (12 Jan 2025 21:46)      ALLERGIES:    ampicillin (Rash)  levofloxacin (Rash)      PMHx:    Arthritis  Cervical cancer    SOCIAL Hx:    former smoker denies etoh and recreational drug use  lives in 2nd floor apartment has walker lives by self  no pets    MEDICATIONS:  STANDING MEDICATIONS  chlorhexidine 2% Cloths 1 Application(s) Topical <User Schedule>  enoxaparin Injectable 40 milliGRAM(s) SubCutaneous every 24 hours  furosemide    Tablet 20 milliGRAM(s) Oral daily  iohexol 300 mG (iodine)/mL Oral Solution 30 milliLiter(s) Oral once  loperamide 2 milliGRAM(s) Oral every 4 hours  sodium chloride 0.9%. 1000 milliLiter(s) IV Continuous <Continuous>    PRN MEDICATIONS  acetaminophen     Tablet .. 650 milliGRAM(s) Oral every 6 hours PRN  aluminum hydroxide/magnesium hydroxide/simethicone Suspension 30 milliLiter(s) Oral every 4 hours PRN  loperamide 2 milliGRAM(s) Oral every 6 hours PRN  melatonin 3 milliGRAM(s) Oral at bedtime PRN  ondansetron Injectable 4 milliGRAM(s) IV Push every 8 hours PRN    VITALS:   T(F): 98.3  HR: 83  BP: 127/64  RR: 16  SpO2: 95%    PHYSICAL EXAM:  Gen: NAD, resting in bed, thin  HEENT: Normocephalic, atraumatic  Neck: supple, no lymphadenopathy  CV: Regular rate & regular rhythm  Lungs: decreased BS at bases, no fremitus  Abdomen: Soft, BS present  Ext: Warm, well perfused  Neuro: moves all extremities against resistance, no droop, awake  Skin: no rash, no erythema    LABS:                        11.5   49.80 )-----------( 131      ( 19 Jan 2025 06:24 )             35.6     01-19    140  |  103  |  9[L]  ----------------------------<  90  3.8   |  25  |  0.8    Ca    8.6      19 Jan 2025 06:24  Phos  3.0     01-19  Mg     2.3     01-19        Urinalysis Basic - ( 19 Jan 2025 06:24 )    Color: x / Appearance: x / SG: x / pH: x  Gluc: 90 mg/dL / Ketone: x  / Bili: x / Urobili: x   Blood: x / Protein: x / Nitrite: x   Leuk Esterase: x / RBC: x / WBC x   Sq Epi: x / Non Sq Epi: x / Bacteria: x  ACC: [id number] EXAM:  CT ABDOMEN AND PELVIS IC   ORDERED BY: TAE LEWIS     PROCEDURE DATE:  01/16/2025          INTERPRETATION:  CLINICAL STATEMENT: Gross hematuria    TECHNIQUE: Contiguous axial CT images were obtained from the lower chest   to the pubic symphysis following administration of 95 cc Omnipaque 350   intravenous contrast.  Oral contrast was not administered.  Reformatted   images in the coronal and sagittal planes were acquired. 5 cc contrast   discarded.    COMPARISON: CT abdomen and pelvis 1/21/2023.    FINDINGS:    LOWER CHEST: Right lower lobe consolidative opacity, which may represent   pneumonia in the appropriate clinical setting. Trace right pleural   effusion.    HEPATOBILIARY: Post cholecystectomy. No significant biliary dilation.   Liver unremarkable.    SPLEEN: Splenic subcentimeter hypodensity, too small to characterize,   likely cyst or hemangioma.    PANCREAS: Unremarkable.    ADRENAL GLANDS: Unremarkable.    KIDNEYS: Mild right hydroureteronephrosis,without obstructing calculus.   Unchanged atrophic left kidney    ABDOMINOPELVIC NODES: Unremarkable.    PELVIC ORGANS: Diffuse urinary bladder wall mural enhancement, pericystic   inflammatory stranding. Post hysterectomy.    PERITONEUM/MESENTERY/BOWEL:Postsurgical change at cecum, may correspond   to appendectomy, unchanged. No ascites. No bowel obstruction.    BONES/SOFT TISSUES: Degenerative change of spine. Mild anterolisthesis of   L4 on L5..    VASCULAR: Unremarkable.    OTHER: Unremarkable.    IMPRESSION:    Findings compatible with urinary bladder infection, cystitis. Mild right   hydroureteronephrosis, without obstructing calculus; findings may be   attributed to right ascending urinary tract infection.    Right lower lobe consolidative opacity, which may represent pneumonia in   the appropriate clinical setting    --- End of Report ---    CASSY FERNANDEZ MD; Attending Radiologist  This document has been electronically signed. Jan 16 2025 10:17PM          Assessment and Plan:   · Assessment	  84 year old female with a history of cervical cancer presenting for lethargy and fall found on floor by family. Admitted for Flu and UTI.      Gross hematuria noted in canister   -Renal bladder sono with Mild-mod right hydronephrosis   - CT A/P with IV contrast  - hydroeureter and consolidation in lung, no stone  - suggestive of ascending cystitis / pyelo    Witnessed Mechanical fall in hospital on 1/16  Hit head and fell of pelvis   -CT head and X-ray pelvis: no evidence of trauma or fracture   -continue to monitor   - no further falls    Influenza A infection    -Tamiflu x5 days, completed abx course  - Blood cx negative x2    Severe Leukocytosis: Infectious with possible underlying  CLL  Heme-onc consult appreciated obtain flow cytometry.   outpatient with Dr. Davis to discuss the final diagnosis and treatment plan.    WILL FOLLOW        84 year old female with a history of cervical cancer who presents to the ED after being found on floor by family today. Patient reports increased weakness and lethargy since yesterday. She fell, hit her head, and could not get up, but does not remember what time she fell. Her sisters found her on the floor this morning. Last known contact with patient was last night. Patient states that she has had a cough and congestion for the past week. Patient lives alone and walks with walker. She also refers to urinary incontinence and burning sensation with urination. Patient is noncompliant with medical care and has not seen a doctor in several years. She denies chest pain, SOB, fever, chills, dizziness, nausea, vomiting and abdominal pain. Patient now with diarrhea since receiving magnesium supplementation.  (12 Jan 2025 21:46)      ALLERGIES:    ampicillin (Rash)  levofloxacin (Rash)      PMHx:    Arthritis  Cervical cancer    SOCIAL Hx:    former smoker denies etoh and recreational drug use  lives in 2nd floor apartment has walker lives by self  no pets    MEDICATIONS:  STANDING MEDICATIONS  chlorhexidine 2% Cloths 1 Application(s) Topical <User Schedule>  enoxaparin Injectable 40 milliGRAM(s) SubCutaneous every 24 hours  furosemide    Tablet 20 milliGRAM(s) Oral daily  iohexol 300 mG (iodine)/mL Oral Solution 30 milliLiter(s) Oral once  loperamide 2 milliGRAM(s) Oral every 4 hours  sodium chloride 0.9%. 1000 milliLiter(s) IV Continuous <Continuous>    PRN MEDICATIONS  acetaminophen     Tablet .. 650 milliGRAM(s) Oral every 6 hours PRN  aluminum hydroxide/magnesium hydroxide/simethicone Suspension 30 milliLiter(s) Oral every 4 hours PRN  loperamide 2 milliGRAM(s) Oral every 6 hours PRN  melatonin 3 milliGRAM(s) Oral at bedtime PRN  ondansetron Injectable 4 milliGRAM(s) IV Push every 8 hours PRN    VITALS:   T(F): 98.3  HR: 83  BP: 127/64  RR: 16  SpO2: 95%    PHYSICAL EXAM:  Gen: NAD, resting in bed, thin  HEENT: Normocephalic, atraumatic  Neck: supple, no lymphadenopathy  CV: Regular rate & regular rhythm  Lungs: decreased BS at bases, no fremitus  Abdomen: Soft, BS present  Ext: Warm, well perfused  Neuro: moves all extremities against resistance, no droop, awake  Skin: no rash, no erythema    LABS:                        11.5   49.80 )-----------( 131      ( 19 Jan 2025 06:24 )             35.6     01-19    140  |  103  |  9[L]  ----------------------------<  90  3.8   |  25  |  0.8    Ca    8.6      19 Jan 2025 06:24  Phos  3.0     01-19  Mg     2.3     01-19        Urinalysis Basic - ( 19 Jan 2025 06:24 )    Color: x / Appearance: x / SG: x / pH: x  Gluc: 90 mg/dL / Ketone: x  / Bili: x / Urobili: x   Blood: x / Protein: x / Nitrite: x   Leuk Esterase: x / RBC: x / WBC x   Sq Epi: x / Non Sq Epi: x / Bacteria: x  ACC: [id number] EXAM:  CT ABDOMEN AND PELVIS IC   ORDERED BY: TAE LEWIS     PROCEDURE DATE:  01/16/2025          INTERPRETATION:  CLINICAL STATEMENT: Gross hematuria    TECHNIQUE: Contiguous axial CT images were obtained from the lower chest   to the pubic symphysis following administration of 95 cc Omnipaque 350   intravenous contrast.  Oral contrast was not administered.  Reformatted   images in the coronal and sagittal planes were acquired. 5 cc contrast   discarded.    COMPARISON: CT abdomen and pelvis 1/21/2023.    FINDINGS:    LOWER CHEST: Right lower lobe consolidative opacity, which may represent   pneumonia in the appropriate clinical setting. Trace right pleural   effusion.    HEPATOBILIARY: Post cholecystectomy. No significant biliary dilation.   Liver unremarkable.    SPLEEN: Splenic subcentimeter hypodensity, too small to characterize,   likely cyst or hemangioma.    PANCREAS: Unremarkable.    ADRENAL GLANDS: Unremarkable.    KIDNEYS: Mild right hydroureteronephrosis,without obstructing calculus.   Unchanged atrophic left kidney    ABDOMINOPELVIC NODES: Unremarkable.    PELVIC ORGANS: Diffuse urinary bladder wall mural enhancement, pericystic   inflammatory stranding. Post hysterectomy.    PERITONEUM/MESENTERY/BOWEL:Postsurgical change at cecum, may correspond   to appendectomy, unchanged. No ascites. No bowel obstruction.    BONES/SOFT TISSUES: Degenerative change of spine. Mild anterolisthesis of   L4 on L5..    VASCULAR: Unremarkable.    OTHER: Unremarkable.    IMPRESSION:    Findings compatible with urinary bladder infection, cystitis. Mild right   hydroureteronephrosis, without obstructing calculus; findings may be   attributed to right ascending urinary tract infection.    Right lower lobe consolidative opacity, which may represent pneumonia in   the appropriate clinical setting    --- End of Report ---    CASSY FERNANDEZ MD; Attending Radiologist  This document has been electronically signed. Jan 16 2025 10:17PM          Assessment and Plan:   · Assessment	  84 year old female with a history of cervical cancer presenting for lethargy and fall found on floor by family. Admitted for Flu and UTI.      Gross hematuria  was noted in canister -purwick  now  urine  light pink in color  -Renal bladder sono with Mild-mod right hydronephrosis   - CT A/P with IV contrast  Findings compatible with urinary bladder infection, cystitis. Mild right   hydroureteronephrosis, without obstructing calculus; findings may be   attributed to right ascending urinary tract infection    Witnessed Mechanical fall in hospital on 1/16  Hit head and fell of pelvis   -CT head and X-ray pelvis: no evidence of trauma or fracture   -continue to monitor   - no further falls    Influenza A infection    -Tamiflu x5 days, completed abx course  - Blood cx negative x2    Severe Leukocytosis: Infectious with possible underlying  CLL  Heme-onc consult appreciated obtain flow cytometry.   outpatient with Dr. Davis to discuss the final diagnosis and treatment plan.    WILL FOLLOW        84 year old female with a history of cervical cancer who presents to the ED after being found on floor by family today. Patient reports increased weakness and lethargy since yesterday. She fell, hit her head, and could not get up, but does not remember what time she fell. Her sisters found her on the floor this morning. Last known contact with patient was last night. Patient states that she has had a cough and congestion for the past week. Patient lives alone and walks with walker. She also refers to urinary incontinence and burning sensation with urination. Patient is noncompliant with medical care and has not seen a doctor in several years. She denies chest pain, SOB, fever, chills, dizziness, nausea, vomiting and abdominal pain. Patient now with diarrhea since receiving magnesium supplementation.  (12 Jan 2025 21:46)      ALLERGIES:    ampicillin (Rash)  levofloxacin (Rash)      PMHx:    Arthritis  Cervical cancer    SOCIAL Hx:    former smoker denies etoh and recreational drug use  lives in 2nd floor apartment has walker lives by self  no pets    MEDICATIONS:  STANDING MEDICATIONS  chlorhexidine 2% Cloths 1 Application(s) Topical <User Schedule>  enoxaparin Injectable 40 milliGRAM(s) SubCutaneous every 24 hours  furosemide    Tablet 20 milliGRAM(s) Oral daily  iohexol 300 mG (iodine)/mL Oral Solution 30 milliLiter(s) Oral once  loperamide 2 milliGRAM(s) Oral every 4 hours  sodium chloride 0.9%. 1000 milliLiter(s) IV Continuous <Continuous>    PRN MEDICATIONS  acetaminophen     Tablet .. 650 milliGRAM(s) Oral every 6 hours PRN  aluminum hydroxide/magnesium hydroxide/simethicone Suspension 30 milliLiter(s) Oral every 4 hours PRN  loperamide 2 milliGRAM(s) Oral every 6 hours PRN  melatonin 3 milliGRAM(s) Oral at bedtime PRN  ondansetron Injectable 4 milliGRAM(s) IV Push every 8 hours PRN    VITALS:   T(F): 98.3  HR: 83  BP: 127/64  RR: 16  SpO2: 95%    PHYSICAL EXAM:  Gen: NAD, resting in bed, thin  HEENT: Normocephalic, atraumatic  Neck: supple, no lymphadenopathy  CV: Regular rate & regular rhythm  Lungs: decreased BS at bases, no fremitus  Abdomen: Soft, BS present  Ext: Warm, well perfused  Neuro: moves all extremities against resistance, no droop, awake  Skin: no rash, no erythema    LABS:                        11.5   49.80 )-----------( 131      ( 19 Jan 2025 06:24 )             35.6     01-19    140  |  103  |  9[L]  ----------------------------<  90  3.8   |  25  |  0.8    Ca    8.6      19 Jan 2025 06:24  Phos  3.0     01-19  Mg     2.3     01-19        Urinalysis Basic - ( 19 Jan 2025 06:24 )    Color: x / Appearance: x / SG: x / pH: x  Gluc: 90 mg/dL / Ketone: x  / Bili: x / Urobili: x   Blood: x / Protein: x / Nitrite: x   Leuk Esterase: x / RBC: x / WBC x   Sq Epi: x / Non Sq Epi: x / Bacteria: x  ACC: [id number] EXAM:  CT ABDOMEN AND PELVIS IC   ORDERED BY: TAE LEWIS     PROCEDURE DATE:  01/16/2025          INTERPRETATION:  CLINICAL STATEMENT: Gross hematuria    TECHNIQUE: Contiguous axial CT images were obtained from the lower chest   to the pubic symphysis following administration of 95 cc Omnipaque 350   intravenous contrast.  Oral contrast was not administered.  Reformatted   images in the coronal and sagittal planes were acquired. 5 cc contrast   discarded.    COMPARISON: CT abdomen and pelvis 1/21/2023.    FINDINGS:    LOWER CHEST: Right lower lobe consolidative opacity, which may represent   pneumonia in the appropriate clinical setting. Trace right pleural   effusion.    HEPATOBILIARY: Post cholecystectomy. No significant biliary dilation.   Liver unremarkable.    SPLEEN: Splenic subcentimeter hypodensity, too small to characterize,   likely cyst or hemangioma.    PANCREAS: Unremarkable.    ADRENAL GLANDS: Unremarkable.    KIDNEYS: Mild right hydroureteronephrosis,without obstructing calculus.   Unchanged atrophic left kidney    ABDOMINOPELVIC NODES: Unremarkable.    PELVIC ORGANS: Diffuse urinary bladder wall mural enhancement, pericystic   inflammatory stranding. Post hysterectomy.    PERITONEUM/MESENTERY/BOWEL:Postsurgical change at cecum, may correspond   to appendectomy, unchanged. No ascites. No bowel obstruction.    BONES/SOFT TISSUES: Degenerative change of spine. Mild anterolisthesis of   L4 on L5..    VASCULAR: Unremarkable.    OTHER: Unremarkable.    IMPRESSION:    Findings compatible with urinary bladder infection, cystitis. Mild right   hydroureteronephrosis, without obstructing calculus; findings may be   attributed to right ascending urinary tract infection.    Right lower lobe consolidative opacity, which may represent pneumonia in   the appropriate clinical setting    --- End of Report ---    CASSY FERNANDEZ MD; Attending Radiologist  This document has been electronically signed. Jan 16 2025 10:17PM          Assessment and Plan:   · Assessment	  84 year old female with a history of cervical cancer presenting for lethargy and fall found on floor by family. Admitted for Flu and UTI.      Gross hematuria  was noted in canister -purwick  now  urine  light pink in color  -Renal bladder sono with Mild-mod right hydronephrosis   - CT A/P with IV contrast  Findings compatible with urinary bladder infection, cystitis. Mild right   hydroureteronephrosis, without obstructing calculus; findings may be   attributed to right ascending urinary tract infection    Witnessed Mechanical fall in hospital on 1/16  Hit head and fell of pelvis   -CT head and X-ray pelvis: no evidence of trauma or fracture   -continue to monitor   - no further falls    Influenza A infection    -Tamiflu x5 days, completed abx course  - Blood cx negative x2    Severe Leukocytosis: Infectious with possible underlying  CLL  Heme-onc consult appreciated obtain flow cytometry.   outpatient with Dr. Davis to discuss the final diagnosis and treatment plan.  Treat UTI

## 2025-01-20 LAB
ANION GAP SERPL CALC-SCNC: 12 MMOL/L — SIGNIFICANT CHANGE UP (ref 7–14)
BASOPHILS # BLD AUTO: 0.08 K/UL — SIGNIFICANT CHANGE UP (ref 0–0.2)
BASOPHILS NFR BLD AUTO: 0.1 % — SIGNIFICANT CHANGE UP (ref 0–1)
BUN SERPL-MCNC: 13 MG/DL — SIGNIFICANT CHANGE UP (ref 10–20)
CALCIUM SERPL-MCNC: 9.3 MG/DL — SIGNIFICANT CHANGE UP (ref 8.4–10.5)
CHLORIDE SERPL-SCNC: 100 MMOL/L — SIGNIFICANT CHANGE UP (ref 98–110)
CO2 SERPL-SCNC: 24 MMOL/L — SIGNIFICANT CHANGE UP (ref 17–32)
CREAT SERPL-MCNC: 0.9 MG/DL — SIGNIFICANT CHANGE UP (ref 0.7–1.5)
EGFR: 64 ML/MIN/1.73M2 — SIGNIFICANT CHANGE UP
EOSINOPHIL # BLD AUTO: 0.11 K/UL — SIGNIFICANT CHANGE UP (ref 0–0.7)
EOSINOPHIL NFR BLD AUTO: 0.2 % — SIGNIFICANT CHANGE UP (ref 0–8)
GLUCOSE SERPL-MCNC: 104 MG/DL — HIGH (ref 70–99)
HCT VFR BLD CALC: 34.8 % — LOW (ref 37–47)
HGB BLD-MCNC: 11.1 G/DL — LOW (ref 12–16)
IMM GRANULOCYTES NFR BLD AUTO: 0.3 % — SIGNIFICANT CHANGE UP (ref 0.1–0.3)
LYMPHOCYTES # BLD AUTO: 45.77 K/UL — HIGH (ref 1.2–3.4)
LYMPHOCYTES # BLD AUTO: 85.8 % — HIGH (ref 20.5–51.1)
MAGNESIUM SERPL-MCNC: 2 MG/DL — SIGNIFICANT CHANGE UP (ref 1.8–2.4)
MCHC RBC-ENTMCNC: 29.8 PG — SIGNIFICANT CHANGE UP (ref 27–31)
MCHC RBC-ENTMCNC: 31.9 G/DL — LOW (ref 32–37)
MCV RBC AUTO: 93.3 FL — SIGNIFICANT CHANGE UP (ref 81–99)
MONOCYTES # BLD AUTO: 1.75 K/UL — HIGH (ref 0.1–0.6)
MONOCYTES NFR BLD AUTO: 3.3 % — SIGNIFICANT CHANGE UP (ref 1.7–9.3)
NEUTROPHILS # BLD AUTO: 5.47 K/UL — SIGNIFICANT CHANGE UP (ref 1.4–6.5)
NEUTROPHILS NFR BLD AUTO: 10.3 % — LOW (ref 42.2–75.2)
NRBC # BLD: 0 /100 WBCS — SIGNIFICANT CHANGE UP (ref 0–0)
NRBC BLD-RTO: 0 /100 WBCS — SIGNIFICANT CHANGE UP (ref 0–0)
PHOSPHATE SERPL-MCNC: 3.6 MG/DL — SIGNIFICANT CHANGE UP (ref 2.1–4.9)
PLATELET # BLD AUTO: 178 K/UL — SIGNIFICANT CHANGE UP (ref 130–400)
PMV BLD: 11.3 FL — HIGH (ref 7.4–10.4)
POTASSIUM SERPL-MCNC: 4 MMOL/L — SIGNIFICANT CHANGE UP (ref 3.5–5)
POTASSIUM SERPL-SCNC: 4 MMOL/L — SIGNIFICANT CHANGE UP (ref 3.5–5)
RBC # BLD: 3.73 M/UL — LOW (ref 4.2–5.4)
RBC # FLD: 13.3 % — SIGNIFICANT CHANGE UP (ref 11.5–14.5)
SODIUM SERPL-SCNC: 136 MMOL/L — SIGNIFICANT CHANGE UP (ref 135–146)
WBC # BLD: 53.35 K/UL — CRITICAL HIGH (ref 4.8–10.8)
WBC # FLD AUTO: 53.35 K/UL — CRITICAL HIGH (ref 4.8–10.8)

## 2025-01-20 PROCEDURE — 93925 LOWER EXTREMITY STUDY: CPT | Mod: 26

## 2025-01-20 PROCEDURE — 99232 SBSQ HOSP IP/OBS MODERATE 35: CPT

## 2025-01-20 PROCEDURE — 93970 EXTREMITY STUDY: CPT | Mod: 26

## 2025-01-20 RX ADMIN — Medication 20 MILLIGRAM(S): at 05:01

## 2025-01-20 RX ADMIN — ACETAMINOPHEN 650 MILLIGRAM(S): 160 SUSPENSION ORAL at 08:30

## 2025-01-20 RX ADMIN — LOPERAMIDE HYDROCHLORIDE 2 MILLIGRAM(S): 2 CAPSULE ORAL at 17:39

## 2025-01-20 RX ADMIN — ANTISEPTIC SURGICAL SCRUB 1 APPLICATION(S): 0.04 SOLUTION TOPICAL at 05:48

## 2025-01-20 RX ADMIN — LOPERAMIDE HYDROCHLORIDE 2 MILLIGRAM(S): 2 CAPSULE ORAL at 23:00

## 2025-01-20 RX ADMIN — ENOXAPARIN SODIUM 40 MILLIGRAM(S): 100 INJECTION SUBCUTANEOUS at 05:01

## 2025-01-20 RX ADMIN — LOPERAMIDE HYDROCHLORIDE 2 MILLIGRAM(S): 2 CAPSULE ORAL at 12:04

## 2025-01-20 RX ADMIN — LOPERAMIDE HYDROCHLORIDE 2 MILLIGRAM(S): 2 CAPSULE ORAL at 05:01

## 2025-01-20 RX ADMIN — ACETAMINOPHEN 650 MILLIGRAM(S): 160 SUSPENSION ORAL at 09:30

## 2025-01-20 NOTE — CHART NOTE - NSCHARTNOTEFT_GEN_A_CORE
Patient has continuously pulled out her IV's, will therefore discontinue IV fluids for now
Pt c/o diarrhea.  Plan IVF @ 75ml/h  x 8 hours for hydration
genetic testing consent completed by heme/onc PA.    Lab orders placed accordingly
Was notified about pt s/p witnessed fall.  Pt was standing up from a wheelchair and fell backwards hitting her head. No LOC.   Pt states she thought there was a chair behind her that she wanted to sit on.   She denies headache, blurry vision, back pain.  VITALS:   T(C): 36.4 (01-16-25 @ 04:30), Max: 37.1 (01-15-25 @ 20:45)  HR: 74 (01-16-25 @ 04:30) (74 - 92)  BP: 149/75 (01-16-25 @ 04:30) (125/73 - 152/70)  RR: 18 (01-16-25 @ 04:30) (16 - 18)  SpO2: 95% (01-16-25 @ 04:30) (91% - 95%)    GENERAL: female in NAD, sitting on a floor  HEAD:  Atraumatic, Normocephalic  EYES: EOMI  ENT: Moist mucous membranes  NECK: Supple  CHEST/LUNG: Clear to auscultation bilaterally; No wheezing  HEART: Regular rate and rhythm; No rubs, or gallops  ABDOMEN: Bowel sounds present; Soft, Nontender  EXTREMITIES: No clubbing, cyanosis, or edema  NERVOUS SYSTEM:  Alert & Oriented X3, speech clear  MSK: no paraspinal tenderness, FROM all 4 extremities      # Witnessed Fall  - urgent CT head  - urgent Xray pelvis  - fall precautions  - will follow    Case was discussed with Dr. Bob

## 2025-01-20 NOTE — PROGRESS NOTE ADULT - SUBJECTIVE AND OBJECTIVE BOX
LENGTH OF HOSPITAL STAY: 8d    CHIEF COMPLAINT:    Patient is a 84y old  Female who presents with a chief complaint of weakness (16 Jan 2025 14:30)    OVERNIGHT EVENTS:    - no episodes of diarrhea, will deescalate imodium again; pt w/ complaint of BL LE pain;   - pt examined at bedside, updated, answered questions  - tolerating PO, few solid BMs, making urine without urinary sxs (still with some hematuria)    HPI:    Patient is an 84 year old female with a history of cervical cancer who presents to the ED after being found on floor by family today. Patient reports increased weakness and lethargy since yesterday. She fell, hit her head, and could not get up, but does not remember what time she fell. Her sisters found her on the floor this morning. Last known contact with patient was last night. Patient states that she has had a cough and congestion for the past week. Patient lives alone and walks with walker. She also refers to urinary incontinence and burning sensation with urination. Patient is noncompliant with medical care and has not seen a doctor in several years. She denies chest pain, SOB, fever, chills, dizziness, nausea, vomiting and abdominal pain. Patient now with diarrhea since receiving magnesium supplementation.  (12 Jan 2025 21:46)      ALLERGIES:    ampicillin (Rash)  levofloxacin (Rash)      PMHx:    Arthritis  Cervical cancer    SOCIAL Hx:    former smoker denies etoh and recreational drug use  lives in 2nd floor apartment has walker lives by self  no pets    MEDICATIONS:  STANDING MEDICATIONS  chlorhexidine 2% Cloths 1 Application(s) Topical <User Schedule>  enoxaparin Injectable 40 milliGRAM(s) SubCutaneous every 24 hours  furosemide    Tablet 20 milliGRAM(s) Oral daily  iohexol 300 mG (iodine)/mL Oral Solution 30 milliLiter(s) Oral once  loperamide 2 milliGRAM(s) Oral four times a day  sodium chloride 0.9%. 1000 milliLiter(s) IV Continuous <Continuous>    PRN MEDICATIONS  acetaminophen     Tablet .. 650 milliGRAM(s) Oral every 6 hours PRN  aluminum hydroxide/magnesium hydroxide/simethicone Suspension 30 milliLiter(s) Oral every 4 hours PRN  loperamide 2 milliGRAM(s) Oral every 6 hours PRN  melatonin 3 milliGRAM(s) Oral at bedtime PRN  ondansetron Injectable 4 milliGRAM(s) IV Push every 8 hours PRN      LABS:                        11.1   53.35 )-----------( 178      ( 20 Jan 2025 07:08 )             34.8     01-20    136  |  100  |  13  ----------------------------<  104[H]  4.0   |  24  |  0.9    Ca    9.3      20 Jan 2025 07:08  Phos  3.6     01-20  Mg     2.0     01-20        Urinalysis Basic - ( 20 Jan 2025 07:08 )    Color: x / Appearance: x / SG: x / pH: x  Gluc: 104 mg/dL / Ketone: x  / Bili: x / Urobili: x   Blood: x / Protein: x / Nitrite: x   Leuk Esterase: x / RBC: x / WBC x   Sq Epi: x / Non Sq Epi: x / Bacteria: x      VITALS:   T(F): 100  HR: 84  BP: 116/65  RR: 16  SpO2: 93%      PHYSICAL EXAM:    Gen: NAD, resting in bed, thin  HEENT: Normocephalic, atraumatic  Neck: supple, no lymphadenopathy  CV: Regular rate & regular rhythm  Lungs: decreased BS at bases, no fremitus  Abdomen: Soft, BS present  Ext: Warm, well perfused  Neuro: moves all extremities against resistance, no droop, awake  Skin: no rash, no erythema

## 2025-01-21 LAB
ANION GAP SERPL CALC-SCNC: 12 MMOL/L — SIGNIFICANT CHANGE UP (ref 7–14)
BASOPHILS # BLD AUTO: 0.1 K/UL — SIGNIFICANT CHANGE UP (ref 0–0.2)
BASOPHILS NFR BLD AUTO: 0.2 % — SIGNIFICANT CHANGE UP (ref 0–1)
BUN SERPL-MCNC: 12 MG/DL — SIGNIFICANT CHANGE UP (ref 10–20)
CALCIUM SERPL-MCNC: 9.2 MG/DL — SIGNIFICANT CHANGE UP (ref 8.4–10.5)
CHLORIDE SERPL-SCNC: 100 MMOL/L — SIGNIFICANT CHANGE UP (ref 98–110)
CHROM ANALY INTERPHASE BLD FISH-IMP: SIGNIFICANT CHANGE UP
CO2 SERPL-SCNC: 27 MMOL/L — SIGNIFICANT CHANGE UP (ref 17–32)
CREAT SERPL-MCNC: 0.9 MG/DL — SIGNIFICANT CHANGE UP (ref 0.7–1.5)
EGFR: 64 ML/MIN/1.73M2 — SIGNIFICANT CHANGE UP
EOSINOPHIL # BLD AUTO: 0.1 K/UL — SIGNIFICANT CHANGE UP (ref 0–0.7)
EOSINOPHIL NFR BLD AUTO: 0.2 % — SIGNIFICANT CHANGE UP (ref 0–8)
GLUCOSE SERPL-MCNC: 93 MG/DL — SIGNIFICANT CHANGE UP (ref 70–99)
HCT VFR BLD CALC: 34.8 % — LOW (ref 37–47)
HGB BLD-MCNC: 11.2 G/DL — LOW (ref 12–16)
IMM GRANULOCYTES NFR BLD AUTO: 0.2 % — SIGNIFICANT CHANGE UP (ref 0.1–0.3)
LYMPHOCYTES # BLD AUTO: 39.42 K/UL — HIGH (ref 1.2–3.4)
LYMPHOCYTES # BLD AUTO: 86.8 % — HIGH (ref 20.5–51.1)
MAGNESIUM SERPL-MCNC: 1.7 MG/DL — LOW (ref 1.8–2.4)
MCHC RBC-ENTMCNC: 29.8 PG — SIGNIFICANT CHANGE UP (ref 27–31)
MCHC RBC-ENTMCNC: 32.2 G/DL — SIGNIFICANT CHANGE UP (ref 32–37)
MCV RBC AUTO: 92.6 FL — SIGNIFICANT CHANGE UP (ref 81–99)
MONOCYTES # BLD AUTO: 1.42 K/UL — HIGH (ref 0.1–0.6)
MONOCYTES NFR BLD AUTO: 3.1 % — SIGNIFICANT CHANGE UP (ref 1.7–9.3)
NEUTROPHILS # BLD AUTO: 4.28 K/UL — SIGNIFICANT CHANGE UP (ref 1.4–6.5)
NEUTROPHILS NFR BLD AUTO: 9.5 % — LOW (ref 42.2–75.2)
NRBC # BLD: 0 /100 WBCS — SIGNIFICANT CHANGE UP (ref 0–0)
NRBC BLD-RTO: 0 /100 WBCS — SIGNIFICANT CHANGE UP (ref 0–0)
PHOSPHATE SERPL-MCNC: 3.1 MG/DL — SIGNIFICANT CHANGE UP (ref 2.1–4.9)
PLATELET # BLD AUTO: 177 K/UL — SIGNIFICANT CHANGE UP (ref 130–400)
PMV BLD: 11.1 FL — HIGH (ref 7.4–10.4)
POTASSIUM SERPL-MCNC: 3.5 MMOL/L — SIGNIFICANT CHANGE UP (ref 3.5–5)
POTASSIUM SERPL-SCNC: 3.5 MMOL/L — SIGNIFICANT CHANGE UP (ref 3.5–5)
RBC # BLD: 3.76 M/UL — LOW (ref 4.2–5.4)
RBC # FLD: 12.9 % — SIGNIFICANT CHANGE UP (ref 11.5–14.5)
SODIUM SERPL-SCNC: 139 MMOL/L — SIGNIFICANT CHANGE UP (ref 135–146)
WBC # BLD: 45.43 K/UL — CRITICAL HIGH (ref 4.8–10.8)
WBC # FLD AUTO: 45.43 K/UL — CRITICAL HIGH (ref 4.8–10.8)

## 2025-01-21 PROCEDURE — 99232 SBSQ HOSP IP/OBS MODERATE 35: CPT

## 2025-01-21 RX ORDER — MAGNESIUM SULFATE 0.8 MEQ/ML
2 AMPUL (ML) INJECTION
Refills: 0 | Status: COMPLETED | OUTPATIENT
Start: 2025-01-21 | End: 2025-01-21

## 2025-01-21 RX ADMIN — LOPERAMIDE HYDROCHLORIDE 2 MILLIGRAM(S): 2 CAPSULE ORAL at 17:41

## 2025-01-21 RX ADMIN — Medication 25 GRAM(S): at 12:22

## 2025-01-21 RX ADMIN — Medication 25 GRAM(S): at 10:22

## 2025-01-21 RX ADMIN — LOPERAMIDE HYDROCHLORIDE 2 MILLIGRAM(S): 2 CAPSULE ORAL at 11:04

## 2025-01-21 RX ADMIN — ENOXAPARIN SODIUM 40 MILLIGRAM(S): 100 INJECTION SUBCUTANEOUS at 05:03

## 2025-01-21 RX ADMIN — Medication 20 MILLIGRAM(S): at 05:03

## 2025-01-21 RX ADMIN — ANTISEPTIC SURGICAL SCRUB 1 APPLICATION(S): 0.04 SOLUTION TOPICAL at 05:03

## 2025-01-21 RX ADMIN — LOPERAMIDE HYDROCHLORIDE 2 MILLIGRAM(S): 2 CAPSULE ORAL at 05:03

## 2025-01-21 NOTE — PROGRESS NOTE ADULT - SUBJECTIVE AND OBJECTIVE BOX
LENGTH OF HOSPITAL STAY: 9d    CHIEF COMPLAINT:    Patient is a 84y old  Female who presents with a chief complaint of weakness (16 Jan 2025 14:30)    OVERNIGHT EVENTS:    - no episodes of diarrhea, will deescalate imodium again;   - pt examined at bedside, family at bedside, updated, answered questions  - tolerating PO, more solid BMs, making urine without urinary sxs    HPI:    Patient is an 84 year old female with a history of cervical cancer who presents to the ED after being found on floor by family today. Patient reports increased weakness and lethargy since yesterday. She fell, hit her head, and could not get up, but does not remember what time she fell. Her sisters found her on the floor this morning. Last known contact with patient was last night. Patient states that she has had a cough and congestion for the past week. Patient lives alone and walks with walker. She also refers to urinary incontinence and burning sensation with urination. Patient is noncompliant with medical care and has not seen a doctor in several years. She denies chest pain, SOB, fever, chills, dizziness, nausea, vomiting and abdominal pain. Patient now with diarrhea since receiving magnesium supplementation.  (12 Jan 2025 21:46)      ALLERGIES:    ampicillin (Rash)  levofloxacin (Rash)      PMHx:    Arthritis  Cervical cancer    SOCIAL Hx:    former smoker denies etoh and recreational drug use  lives in 2nd floor apartment has walker lives by self  no pets    MEDICATIONS:  STANDING MEDICATIONS  chlorhexidine 2% Cloths 1 Application(s) Topical <User Schedule>  enoxaparin Injectable 40 milliGRAM(s) SubCutaneous every 24 hours  furosemide    Tablet 20 milliGRAM(s) Oral daily  loperamide 2 milliGRAM(s) Oral four times a day    PRN MEDICATIONS  acetaminophen     Tablet .. 650 milliGRAM(s) Oral every 6 hours PRN  aluminum hydroxide/magnesium hydroxide/simethicone Suspension 30 milliLiter(s) Oral every 4 hours PRN  loperamide 2 milliGRAM(s) Oral every 6 hours PRN  melatonin 3 milliGRAM(s) Oral at bedtime PRN  ondansetron Injectable 4 milliGRAM(s) IV Push every 8 hours PRN      LABS:                        11.2   45.43 )-----------( 177      ( 21 Jan 2025 07:35 )             34.8     01-21    139  |  100  |  12  ----------------------------<  93  3.5   |  27  |  0.9    Ca    9.2      21 Jan 2025 07:35  Phos  3.1     01-21  Mg     1.7     01-21        Urinalysis Basic - ( 21 Jan 2025 07:35 )    Color: x / Appearance: x / SG: x / pH: x  Gluc: 93 mg/dL / Ketone: x  / Bili: x / Urobili: x   Blood: x / Protein: x / Nitrite: x   Leuk Esterase: x / RBC: x / WBC x   Sq Epi: x / Non Sq Epi: x / Bacteria: x      VITALS:   T(F): 99.8  HR: 90  BP: 127/74  RR: 18  SpO2: 94%          PHYSICAL EXAM:    Gen: NAD, resting in bed, thin  HEENT: Normocephalic, atraumatic  Neck: supple, no lymphadenopathy  CV: Regular rate & regular rhythm  Lungs: decreased BS at bases, no fremitus  Abdomen: Soft, BS present  Ext: Warm, well perfused  Neuro: moves all extremities against resistance, no droop, awake  Skin: no rash, no erythema

## 2025-01-21 NOTE — PROGRESS NOTE ADULT - ASSESSMENT
84 year old female with a history of cervical cancer presenting for lethargy and fall found on floor by family. Admitted for Flu and UTI.    #Influenza A infection  #UTI  #Severe Leukocytosis, 2/2 infx vs possibly underlying CLL  - sepsis not present on admission, on room air satting well  - WBC 44K>57K  - CXR bilateral prominent interstitial opacities  - Flu A positive  - UA positive  - blood cx negative x2, urine cx contaminated  - Tamiflu x5 days  - Aztreonam   - f/u ID regarding course/duration of abx  - Heme/Onc eval noted regarding leukocytosis, obtain flow cytometry. f/u as an outpatient with Dr. Davis to discuss the final diagnosis and treatment plan.    #Acute diarrhea  - multiple loose watery BM  - C diff negative  - f/u GI PCR  - IVF hydration  - not on stool softeners  - trial imodium  - pending clinical improvement in BM    #b/l lower extremity edema 2/2 component of HFpEF  - LE duplex negative for DVT  - BNP 2624  - CXR bilateral prominent interstitial opacities  - TTE: EF 65%, G1DD, mild AS, mild pulm HTN    #elevated ck level  - s/p 1L in ER  - encourage po hydration  - caution IV fluid until echo and BNP resulted     #fall   - ct head and neck negative   - pt eval noted - recommending rehab  - fall risk protocol     #Progress Note Handoff  Pending (specify): f/u ID recs, obtain flow cytometry, pending improvement in BM, anticipate 24-48 hrs  
84 year old female with a history of cervical cancer presenting for lethargy and fall found on floor by family. Admitted for Flu and UTI.    Acute diarrhea> slowly  improving   - C diff and GI PCR:  negative  -c/w Imodium standing --> q6 --> q8 tomorrow if remains well controlled    Gross hematuria noted in canister   -Renal bladder sono with Mild-mod right hydronephrosis   - CT A/P with IV contrast  - hydroeureter and consolidation in lung, no stone  - s/p cystitis treatment  - improving  - no further intervention    Witnessed Mechanical fall in hospital on 1/16  Hit head and fell of pelvis   -CT head and X-ray pelvis: no evidence of trauma or fracture   -continue to monitor   - no further falls    Influenza A infection  Acute Cystitis with contaminated Ucx   -Tamiflu x5 days, completed abx course  - Blood cx negative x2    Severe Leukocytosis: Infectious with possible underlying  CLL  Heme-onc consult appreciated obtain flow cytometry.   outpatient with Dr. Davis to discuss the final diagnosis and treatment plan.    B/L lower extremity edema  - LE duplex negative for DVT, BNP 2624  - CXR bilateral prominent reticular  interstitial opacities  - TTE: EF 65%, G1DD, mild AS, mild pulm HTN  - d/c lasix    DVT PPX: Lovenox   Full code   Dispo: from Home going to [facility]   pending improvement in diarrhea   
84 year old female with a history of cervical cancer presenting for lethargy and fall found on floor by family. Admitted for Flu and UTI.    Influenza A infection  Acute Cystitis with contaminated Ucx   -Tamiflu x5 days, complete abx course pending ID eval   - Bood cx negative x2    Severe Leukocytosis: Infectious with possible underlying  CLL  Heme-onc consult appreciated obtain flow cytometry.   outpatient with Dr. Davis to discuss the final diagnosis and treatment plan.    Acute diarrhea> improving   - C diff and GI PCR:  negative  -c/w Imodium and  IVF hydration    B/L lower extremity edema  - LE duplex negative for DVT, BNP 2624  - CXR bilateral prominent reticular  interstitial opacities  - TTE: EF 65%, G1DD, mild AS, mild pulm HTN    Mechanical Fall   - ct head and neck negative   - pt eval noted - recommending rehab  - fall risk protocol     DVT PPX: Lovenox   Full code   Dispo: from Home   Anticipate D/C in AM, pending improvement in electrolyte and diarrhea 
84 year old female with a history of cervical cancer presenting for lethargy and fall found on floor by family. Admitted for Flu and UTI.    Witnessed Mechanical fall in hospital on 1/16  Hit head and fell of pelvis   -CT head and X-ray pelvis: no evidence of trauma or fracture   -continue to monitor   - no further falls    Gross hematuria noted in canister   -Renal bladder sono with Mild-mod right hydronephrosis   - CT A/P with IV contrast  - hydroeureter and consolidation in lung, no stone  - suggestive of ascending cystitis / pyelo  - f/u UA and ID recs    Influenza A infection  Acute Cystitis with contaminated Ucx   -Tamiflu x5 days, complete abx course  - Blood cx negative x2    Severe Leukocytosis: Infectious with possible underlying  CLL  Heme-onc consult appreciated obtain flow cytometry.   outpatient with Dr. Davis to discuss the final diagnosis and treatment plan.    Acute diarrhea> slowly  improving   - C diff and GI PCR:  negative  -c/w Imodium and  IVF hydration    B/L lower extremity edema  - LE duplex negative for DVT, BNP 2624  - CXR bilateral prominent reticular  interstitial opacities  - TTE: EF 65%, G1DD, mild AS, mild pulm HTN    Mechanical Fall   - ct head and neck negative   - pt eval noted - recommending rehab  - fall risk protocol     DVT PPX: Lovenox   Full code   Dispo: from Home   pending improvement in diarrhea and w/up for hematuria, f/u id recs  
84 year old female with a history of cervical cancer presenting for lethargy and fall found on floor by family. Admitted for Flu and UTI.    Acute diarrhea> slowly  improving   - C diff and GI PCR:  negative  -c/w Imodium and  IVF hydration    Gross hematuria noted in canister   -Renal bladder sono with Mild-mod right hydronephrosis   - CT A/P with IV contrast  - hydroeureter and consolidation in lung, no stone  - suggestive of ascending cystitis / pyelo  - f/u UA and ID recs    Witnessed Mechanical fall in hospital on 1/16  Hit head and fell of pelvis   -CT head and X-ray pelvis: no evidence of trauma or fracture   -continue to monitor   - no further falls    Influenza A infection  Acute Cystitis with contaminated Ucx   -Tamiflu x5 days, completed abx course  - Blood cx negative x2    Severe Leukocytosis: Infectious with possible underlying  CLL  Heme-onc consult appreciated obtain flow cytometry.   outpatient with Dr. Davis to discuss the final diagnosis and treatment plan.        B/L lower extremity edema  - LE duplex negative for DVT, BNP 2624  - CXR bilateral prominent reticular  interstitial opacities  - TTE: EF 65%, G1DD, mild AS, mild pulm HTN      DVT PPX: Lovenox   Full code   Dispo: from Home   pending improvement in diarrhea and w/up for hematuria, f/u id recs  
84 year old female with a history of cervical cancer presenting for lethargy and fall found on floor by family. Admitted for Flu and UTI.    #Influenza A infection  #UTI  #Severe Leukocytosis  - sepsis not present on admission, on room air satting well  - WBC 44K on admission, downtrending  - CXR bilateral prominent interstitial opacities  - Flu A positive  - UA positive  - Tamiflu x5 days  - Aztreonam   - f/u blood and urine cultures    #b/l lower extremity edema 2/2 component of HFpEF  - LE duplex negative for DVT  - BNP 2624  - CXR bilateral prominent interstitial opacities  - TTE: EF 65%, G1DD, mild AS, mild pulm HTN    #elevated ck level  - s/p 1L in ER  - encourage po hydration  - caution IV fluid until echo and BNP resulted     #fall   - ct head and neck negative   - pt eval noted - recommending rehab  - fall risk protocol     #Progress Note Handoff  Pending (specify): f/u cultures    
84 year old female with a history of cervical cancer presenting for lethargy and fall found on floor by family. Admitted for Flu and UTI.    Acute diarrhea> slowly  improving   - C diff and GI PCR:  negative  -c/w Imodium standing and  IVF hydration    Gross hematuria noted in canister   -Renal bladder sono with Mild-mod right hydronephrosis   - CT A/P with IV contrast  - hydroeureter and consolidation in lung, no stone  - suggestive of ascending cystitis / pyelo  - f/u UA and ID recs    Witnessed Mechanical fall in hospital on 1/16  Hit head and fell of pelvis   -CT head and X-ray pelvis: no evidence of trauma or fracture   -continue to monitor   - no further falls    Influenza A infection  Acute Cystitis with contaminated Ucx   -Tamiflu x5 days, completed abx course  - Blood cx negative x2    Severe Leukocytosis: Infectious with possible underlying  CLL  Heme-onc consult appreciated obtain flow cytometry.   outpatient with Dr. Davis to discuss the final diagnosis and treatment plan.        B/L lower extremity edema  - LE duplex negative for DVT, BNP 2624  - CXR bilateral prominent reticular  interstitial opacities  - TTE: EF 65%, G1DD, mild AS, mild pulm HTN      DVT PPX: Lovenox   Full code   Dispo: from Home   pending improvement in diarrhea and w/up for hematuria, f/u id recs  
84 year old female with a history of cervical cancer presenting for lethargy and fall found on floor by family. Admitted for Flu and UTI.    Acute diarrhea> slowly  improving   - C diff and GI PCR:  negative  -c/w Imodium standing --> q6 --> q8 tomorrow if remains well controlled    Gross hematuria noted in canister   -Renal bladder sono with Mild-mod right hydronephrosis   - CT A/P with IV contrast  - hydroeureter and consolidation in lung, no stone  - s/p cystitis treatment  - improving  - no further intervention    Witnessed Mechanical fall in hospital on 1/16  Hit head and fell of pelvis   -CT head and X-ray pelvis: no evidence of trauma or fracture   -continue to monitor   - no further falls    Influenza A infection  Acute Cystitis with contaminated Ucx   -Tamiflu x5 days, completed abx course  - Blood cx negative x2    Severe Leukocytosis: Infectious with possible underlying  CLL  Heme-onc consult appreciated obtain flow cytometry.   outpatient with Dr. Davis to discuss the final diagnosis and treatment plan.    B/L lower extremity edema  - LE duplex negative for DVT, BNP 2624  - CXR bilateral prominent reticular  interstitial opacities  - TTE: EF 65%, G1DD, mild AS, mild pulm HTN    DVT PPX: Lovenox   Full code   Dispo: from Home   pending improvement in diarrhea   
84 year old female with a history of cervical cancer presenting for lethargy and fall found on floor by family. Admitted for Flu and UTI.    Witnessed Mechanical fall in hospital on 1/16  Hit head and fell of pelvis   -CT head and X-ray pelvis: no evidence of trauma or fracture   -continue to monitor     Gross hematuria noted in canister   -Renal bladder sono with Mild-mod right hydronephrosis   -F/up CT A/P with IV contrast      Influenza A infection  Acute Cystitis with contaminated Ucx   -Tamiflu x5 days, complete abx course  - Blood cx negative x2    Severe Leukocytosis: Infectious with possible underlying  CLL  Heme-onc consult appreciated obtain flow cytometry.   outpatient with Dr. Davis to discuss the final diagnosis and treatment plan.    Acute diarrhea> slowly  improving   - C diff and GI PCR:  negative  -c/w Imodium and  IVF hydration    B/L lower extremity edema  - LE duplex negative for DVT, BNP 2624  - CXR bilateral prominent reticular  interstitial opacities  - TTE: EF 65%, G1DD, mild AS, mild pulm HTN    Mechanical Fall   - ct head and neck negative   - pt eval noted - recommending rehab  - fall risk protocol     DVT PPX: Lovenox   Full code   Dispo: from Home   pending improvement in diarrhea and w/up for hematuria

## 2025-01-21 NOTE — PROGRESS NOTE ADULT - NUTRITIONAL ASSESSMENT
This patient has been assessed with a concern for Malnutrition and has been determined to have a diagnosis/diagnoses of Severe protein-calorie malnutrition and Underweight (BMI < 19).    This patient is being managed with:   Diet Regular-  Entered: Jan 12 2025 10:11PM  

## 2025-01-22 ENCOUNTER — TRANSCRIPTION ENCOUNTER (OUTPATIENT)
Age: 85
End: 2025-01-22

## 2025-01-22 VITALS
HEART RATE: 73 BPM | RESPIRATION RATE: 18 BRPM | TEMPERATURE: 98 F | DIASTOLIC BLOOD PRESSURE: 58 MMHG | SYSTOLIC BLOOD PRESSURE: 104 MMHG | OXYGEN SATURATION: 95 %

## 2025-01-22 LAB
ANION GAP SERPL CALC-SCNC: 9 MMOL/L — SIGNIFICANT CHANGE UP (ref 7–14)
BASOPHILS # BLD AUTO: 0.05 K/UL — SIGNIFICANT CHANGE UP (ref 0–0.2)
BASOPHILS NFR BLD AUTO: 0.1 % — SIGNIFICANT CHANGE UP (ref 0–1)
BUN SERPL-MCNC: 11 MG/DL — SIGNIFICANT CHANGE UP (ref 10–20)
CALCIUM SERPL-MCNC: 9 MG/DL — SIGNIFICANT CHANGE UP (ref 8.4–10.5)
CHLORIDE SERPL-SCNC: 99 MMOL/L — SIGNIFICANT CHANGE UP (ref 98–110)
CO2 SERPL-SCNC: 29 MMOL/L — SIGNIFICANT CHANGE UP (ref 17–32)
CREAT SERPL-MCNC: 0.8 MG/DL — SIGNIFICANT CHANGE UP (ref 0.7–1.5)
EGFR: 74 ML/MIN/1.73M2 — SIGNIFICANT CHANGE UP
EOSINOPHIL # BLD AUTO: 0.1 K/UL — SIGNIFICANT CHANGE UP (ref 0–0.7)
EOSINOPHIL NFR BLD AUTO: 0.3 % — SIGNIFICANT CHANGE UP (ref 0–8)
GLUCOSE SERPL-MCNC: 87 MG/DL — SIGNIFICANT CHANGE UP (ref 70–99)
HCT VFR BLD CALC: 33.9 % — LOW (ref 37–47)
HGB BLD-MCNC: 10.9 G/DL — LOW (ref 12–16)
IMM GRANULOCYTES NFR BLD AUTO: 0.2 % — SIGNIFICANT CHANGE UP (ref 0.1–0.3)
LYMPHOCYTES # BLD AUTO: 34.07 K/UL — HIGH (ref 1.2–3.4)
LYMPHOCYTES # BLD AUTO: 86.6 % — HIGH (ref 20.5–51.1)
MAGNESIUM SERPL-MCNC: 2.4 MG/DL — SIGNIFICANT CHANGE UP (ref 1.8–2.4)
MCHC RBC-ENTMCNC: 30 PG — SIGNIFICANT CHANGE UP (ref 27–31)
MCHC RBC-ENTMCNC: 32.2 G/DL — SIGNIFICANT CHANGE UP (ref 32–37)
MCV RBC AUTO: 93.4 FL — SIGNIFICANT CHANGE UP (ref 81–99)
MONOCYTES # BLD AUTO: 1.52 K/UL — HIGH (ref 0.1–0.6)
MONOCYTES NFR BLD AUTO: 3.9 % — SIGNIFICANT CHANGE UP (ref 1.7–9.3)
NEUTROPHILS # BLD AUTO: 3.51 K/UL — SIGNIFICANT CHANGE UP (ref 1.4–6.5)
NEUTROPHILS NFR BLD AUTO: 8.9 % — LOW (ref 42.2–75.2)
NRBC # BLD: 0 /100 WBCS — SIGNIFICANT CHANGE UP (ref 0–0)
NRBC BLD-RTO: 0 /100 WBCS — SIGNIFICANT CHANGE UP (ref 0–0)
PLATELET # BLD AUTO: 189 K/UL — SIGNIFICANT CHANGE UP (ref 130–400)
PMV BLD: 11 FL — HIGH (ref 7.4–10.4)
POTASSIUM SERPL-MCNC: 4.3 MMOL/L — SIGNIFICANT CHANGE UP (ref 3.5–5)
POTASSIUM SERPL-SCNC: 4.3 MMOL/L — SIGNIFICANT CHANGE UP (ref 3.5–5)
RBC # BLD: 3.63 M/UL — LOW (ref 4.2–5.4)
RBC # FLD: 12.9 % — SIGNIFICANT CHANGE UP (ref 11.5–14.5)
SODIUM SERPL-SCNC: 137 MMOL/L — SIGNIFICANT CHANGE UP (ref 135–146)
WBC # BLD: 39.33 K/UL — HIGH (ref 4.8–10.8)
WBC # FLD AUTO: 39.33 K/UL — HIGH (ref 4.8–10.8)

## 2025-01-22 PROCEDURE — 99232 SBSQ HOSP IP/OBS MODERATE 35: CPT

## 2025-01-22 RX ORDER — LOPERAMIDE HYDROCHLORIDE 2 MG/1
1 CAPSULE ORAL
Qty: 9 | Refills: 0
Start: 2025-01-22 | End: 2025-01-24

## 2025-01-22 RX ADMIN — ENOXAPARIN SODIUM 40 MILLIGRAM(S): 100 INJECTION SUBCUTANEOUS at 05:00

## 2025-01-22 RX ADMIN — ANTISEPTIC SURGICAL SCRUB 1 APPLICATION(S): 0.04 SOLUTION TOPICAL at 05:01

## 2025-01-22 RX ADMIN — LOPERAMIDE HYDROCHLORIDE 2 MILLIGRAM(S): 2 CAPSULE ORAL at 00:38

## 2025-01-22 NOTE — DISCHARGE NOTE PROVIDER - PROVIDER TOKENS
PROVIDER:[TOKEN:[[id number]],FOLLOWUP:[1 week]],PROVIDER:[TOKEN:[[id number]],FOLLOWUP:[1 week]]

## 2025-01-22 NOTE — DISCHARGE NOTE PROVIDER - DETAILS OF MALNUTRITION DIAGNOSIS/DIAGNOSES
This patient has been assessed with a concern for Malnutrition and was treated during this hospitalization for the following Nutrition diagnosis/diagnoses:     -  01/15/2025: Severe protein-calorie malnutrition   -  01/15/2025: Underweight (BMI < 19)

## 2025-01-22 NOTE — DISCHARGE NOTE NURSING/CASE MANAGEMENT/SOCIAL WORK - FINANCIAL ASSISTANCE
[facility] provides services at a reduced cost to those who are determined to be eligible through [facility]’s financial assistance program. Information regarding [facility]’s financial assistance program can be found by going to [email or web address] or by calling [phone].

## 2025-01-22 NOTE — DISCHARGE NOTE PROVIDER - HOSPITAL COURSE
84 year old female with a history of cervical cancer presenting for lethargy and fall found on floor by family.   Pt was admitted to medicine for Flu and UTI.    # Acute diarrhea> slowly  improving   - C diff and GI PCR:  negative  -c/w Imodium standing --> q6 --> q8 tomorrow if remains well controlled    # Gross hematuria noted in canister   -Renal bladder sono with Mild-mod right hydronephrosis   - CT A/P with IV contrast  - hydroeureter and consolidation in lung, no stone  - s/p cystitis treatment  - improving  - no further intervention    # Witnessed Mechanical fall in hospital on 1/16  Hit head and fell of pelvis   -CT head and X-ray pelvis: no evidence of trauma or fracture   -continue to monitor   - no further falls    # Influenza A infection  Acute Cystitis with contaminated Ucx   -Tamiflu x5 days, completed abx course  - Blood cx negative x2    # Severe Leukocytosis: Infectious with possible underlying  CLL  Heme-onc consult appreciated obtain flow cytometry.   outpatient with Dr. Davis to discuss the final diagnosis and treatment plan.    # B/L lower extremity edema  - LE duplex negative for DVT, BNP 2624  - CXR bilateral prominent reticular  interstitial opacities  - TTE: EF 65%, G1DD, mild AS, mild pulm HTN  - d/c lasix    Today pt is feeling better and her diarrhea improved. Pt is medically stable for a hospital dc to [facility]. 84 year old female with a history of cervical cancer presenting for lethargy and fall found on floor by family.   Pt was admitted to medicine for Flu and UTI.    # Acute diarrhea> improving   - C diff and GI PCR:  negative  -Imodium prn    # Gross hematuria noted in canister   -Renal bladder sono with Mild-mod right hydronephrosis   - CT A/P with IV contrast  - hydroureter and consolidation in lung, no stone  - s/p cystitis treatment  - improving  - no further intervention    # Witnessed Mechanical fall in hospital on 1/16  Hit head and fell of pelvis   -CT head and X-ray pelvis: no evidence of trauma or fracture   -continue to monitor   - no further falls    # Influenza A infection  Acute Cystitis with contaminated Ucx   -Tamiflu x5 days, completed abx course  - Blood cx negative x2    # Severe Leukocytosis: Infectious with possible underlying  CLL  Heme-onc consult appreciated obtain flow cytometry.   outpatient with Dr. Davis to discuss the final diagnosis and treatment plan.    # B/L lower extremity edema  - LE duplex negative for DVT, BNP 2624  - CXR bilateral prominent reticular  interstitial opacities  - TTE: EF 65%, G1DD, mild AS, mild pulm HTN  - d/c lasix    Today pt is feeling better and her diarrhea improved. Pt is medically stable for a hospital dc to [facility].

## 2025-01-22 NOTE — DISCHARGE NOTE PROVIDER - NSDCCPCAREPLAN_GEN_ALL_CORE_FT
PRINCIPAL DISCHARGE DIAGNOSIS  Diagnosis: Influenza A  Assessment and Plan of Treatment: treated and improved  please follow up with your PCP in 1 week for reassessment      SECONDARY DISCHARGE DIAGNOSES  Diagnosis: Hypomagnesemia  Assessment and Plan of Treatment: supplemented    Diagnosis: Diarrhea  Assessment and Plan of Treatment: treated and resolved  please stay hydrated    Diagnosis: Leukocytosis  Assessment and Plan of Treatment: Heme-onc consulted and recommended flow cytometry  please follow up with Dr. Davis to discuss the final diagnosis and treatment plan    Diagnosis: Fall  Assessment and Plan of Treatment: no acute fracture  fall precautions  conitnue PT as tolerated     PRINCIPAL DISCHARGE DIAGNOSIS  Diagnosis: Influenza A  Assessment and Plan of Treatment: treated and improved  please follow up with your PCP in 1 week for reassessment      SECONDARY DISCHARGE DIAGNOSES  Diagnosis: Diarrhea  Assessment and Plan of Treatment: Diarrhea  Diarrhea is frequent loose or watery bowel movements that has many causes. Diarrhea can make you feel weak and cause you to become dehydrated. Diarrhea typically lasts 2–3 days, but can last longer if it is a sign of something more serious. Drink clear fluids to prevent dehydration. Eat bland, easy-to-digest foods as tolerated.   SEEK IMMEDIATE MEDICAL CARE IF YOU HAVE ANY OF THE FOLLOWING SYMPTOMS: high fevers, lightheadedness/dizziness, chest pain, black or bloody stools, shortness of breath, severe abdominal or back pain, or any signs of dehydration.  treated and resolved  please stay hydrated    Diagnosis: Leukocytosis  Assessment and Plan of Treatment: Heme-onc consulted and recommended flow cytometry  please follow up with Dr. Davis to discuss the final diagnosis and treatment plan    Diagnosis: Fall  Assessment and Plan of Treatment: no acute fracture  fall precautions  conitnue PT as tolerated

## 2025-01-22 NOTE — DISCHARGE NOTE PROVIDER - CARE PROVIDER_API CALL
Porfirio Ortiz  Internal Medicine  [address] [place] [address]  Phone: [phone]  Fax: [phone]  Follow Up Time: 1 week    Clarence Davis  Internal Medicine  [address] [place] [id number]  Phone: [phone]  Fax: [phone]  Follow Up Time: 1 week

## 2025-01-22 NOTE — DISCHARGE NOTE NURSING/CASE MANAGEMENT/SOCIAL WORK - PATIENT PORTAL LINK FT
You can access the FollowMyHealth Patient Portal offered by [facility] by registering at the following website: [email or web address]. By joining [employer]’s FollowMyHealth portal, you will also be able to view your health information using other applications (apps) compatible with our system.

## 2025-01-22 NOTE — DISCHARGE NOTE NURSING/CASE MANAGEMENT/SOCIAL WORK - NSDCPEFALRISK_GEN_ALL_CORE
For information on Fall & Injury Prevention, visit: [email or web address] OR  [email or web address] OR  https://www.cdc.gov/steadi/patient.html

## 2025-01-22 NOTE — DISCHARGE NOTE PROVIDER - CARE PROVIDERS DIRECT ADDRESSES
[email or web address]

## 2025-01-23 ENCOUNTER — NON-APPOINTMENT (OUTPATIENT)
Age: 85
End: 2025-01-23

## 2025-01-31 LAB — CHROM ANALY OVERALL INTERP SPEC-IMP: SIGNIFICANT CHANGE UP

## 2025-05-22 ENCOUNTER — OUTPATIENT (OUTPATIENT)
Dept: OUTPATIENT SERVICES | Facility: HOSPITAL | Age: 85
LOS: 1 days | End: 2025-05-22
Payer: MEDICARE

## 2025-05-22 ENCOUNTER — APPOINTMENT (OUTPATIENT)
Age: 85
End: 2025-05-22
Payer: MEDICARE

## 2025-05-22 ENCOUNTER — LABORATORY RESULT (OUTPATIENT)
Age: 85
End: 2025-05-22

## 2025-05-22 VITALS
RESPIRATION RATE: 16 BRPM | SYSTOLIC BLOOD PRESSURE: 117 MMHG | HEART RATE: 63 BPM | DIASTOLIC BLOOD PRESSURE: 61 MMHG | TEMPERATURE: 97.6 F | WEIGHT: 112 LBS | OXYGEN SATURATION: 95 %

## 2025-05-22 DIAGNOSIS — D72.820 LYMPHOCYTOSIS (SYMPTOMATIC): ICD-10-CM

## 2025-05-22 DIAGNOSIS — D72.829 ELEVATED WHITE BLOOD CELL COUNT, UNSPECIFIED: ICD-10-CM

## 2025-05-22 PROCEDURE — 88184 FLOWCYTOMETRY/ TC 1 MARKER: CPT

## 2025-05-22 PROCEDURE — 99214 OFFICE O/P EST MOD 30 MIN: CPT

## 2025-05-22 PROCEDURE — 82607 VITAMIN B-12: CPT

## 2025-05-22 PROCEDURE — 87205 SMEAR GRAM STAIN: CPT

## 2025-05-22 PROCEDURE — 85025 COMPLETE CBC W/AUTO DIFF WBC: CPT

## 2025-05-22 PROCEDURE — 84443 ASSAY THYROID STIM HORMONE: CPT

## 2025-05-22 PROCEDURE — 86880 COOMBS TEST DIRECT: CPT

## 2025-05-22 PROCEDURE — 88189 FLOWCYTOMETRY/READ 16 & >: CPT | Mod: 59

## 2025-05-22 PROCEDURE — 88185 FLOWCYTOMETRY/TC ADD-ON: CPT

## 2025-05-22 PROCEDURE — 85045 AUTOMATED RETICULOCYTE COUNT: CPT

## 2025-05-22 PROCEDURE — 82728 ASSAY OF FERRITIN: CPT

## 2025-05-22 PROCEDURE — 99214 OFFICE O/P EST MOD 30 MIN: CPT | Mod: 25

## 2025-05-22 PROCEDURE — 83615 LACTATE (LD) (LDH) ENZYME: CPT

## 2025-05-22 PROCEDURE — 80053 COMPREHEN METABOLIC PANEL: CPT

## 2025-05-23 ENCOUNTER — NON-APPOINTMENT (OUTPATIENT)
Age: 85
End: 2025-05-23

## 2025-05-23 DIAGNOSIS — D72.829 ELEVATED WHITE BLOOD CELL COUNT, UNSPECIFIED: ICD-10-CM

## 2025-05-23 LAB
ALBUMIN SERPL ELPH-MCNC: 3.6 G/DL
ALP BLD-CCNC: 84 U/L
ALT SERPL-CCNC: 10 U/L
ANION GAP SERPL CALC-SCNC: 9 MMOL/L
AST SERPL-CCNC: 20 U/L
AUTO BASOPHILS #: 0.03 K/UL
AUTO BASOPHILS %: 0.1 %
AUTO EOSINOPHILS #: 0.07 K/UL
AUTO EOSINOPHILS %: 0.1 %
AUTO IMMATURE GRANULOCYTES #: 0.07 K/UL
AUTO LYMPHOCYTES #: 42.34 K/UL
AUTO LYMPHOCYTES %: 89.1 %
AUTO MONOCYTES #: 1.25 K/UL
AUTO MONOCYTES %: 2.6 %
AUTO NEUTROPHILS #: 3.74 K/UL
AUTO NEUTROPHILS %: 8 %
AUTO NRBC #: 0 K/UL
BILIRUB SERPL-MCNC: <0.2 MG/DL
BUN SERPL-MCNC: 31 MG/DL
CALCIUM SERPL-MCNC: 9 MG/DL
CHLORIDE SERPL-SCNC: 110 MMOL/L
CO2 SERPL-SCNC: 21 MMOL/L
CREAT SERPL-MCNC: 1.3 MG/DL
DAT POLY: NORMAL
EGFRCR SERPLBLD CKD-EPI 2021: 41 ML/MIN/1.73M2
GLUCOSE SERPL-MCNC: 97 MG/DL
HCT VFR BLD CALC: 35.6 %
HGB BLD-MCNC: 10.9 G/DL
IMM GRANULOCYTES NFR BLD AUTO: 0.1 %
LDH SERPL-CCNC: 235
MAN DIFF?: NORMAL
MCHC RBC-ENTMCNC: 28.3 PG
MCHC RBC-ENTMCNC: 30.6 G/DL
MCV RBC AUTO: 92.5 FL
PLATELET # BLD AUTO: 143 K/UL
PMV BLD AUTO: 0 /100 WBCS
PMV BLD: 10.2 FL
POTASSIUM SERPL-SCNC: 4.5 MMOL/L
PROT SERPL-MCNC: 5.6 G/DL
RBC # BLD: 3.85 M/UL
RBC # FLD: 13.9 %
SODIUM SERPL-SCNC: 140 MMOL/L
WBC # FLD AUTO: 47.5 K/UL

## 2025-05-25 LAB
FERRITIN SERPL-MCNC: 44 NG/ML
TSH SERPL-ACNC: 2.83 UIU/ML
VIT B12 SERPL-MCNC: 235 PG/ML

## 2025-07-08 ENCOUNTER — INPATIENT (INPATIENT)
Facility: HOSPITAL | Age: 85
LOS: 9 days | Discharge: NURSING, REHAB OR LONG-TERM CARE | DRG: 690 | End: 2025-07-18
Attending: INTERNAL MEDICINE | Admitting: STUDENT IN AN ORGANIZED HEALTH CARE EDUCATION/TRAINING PROGRAM
Payer: MEDICARE

## 2025-07-08 VITALS
SYSTOLIC BLOOD PRESSURE: 113 MMHG | DIASTOLIC BLOOD PRESSURE: 68 MMHG | TEMPERATURE: 98 F | OXYGEN SATURATION: 98 % | RESPIRATION RATE: 16 BRPM | HEART RATE: 67 BPM

## 2025-07-08 DIAGNOSIS — N39.0 URINARY TRACT INFECTION, SITE NOT SPECIFIED: ICD-10-CM

## 2025-07-08 LAB
ALBUMIN SERPL ELPH-MCNC: 3.6 G/DL — SIGNIFICANT CHANGE UP (ref 3.5–5.2)
ALP SERPL-CCNC: 62 U/L — SIGNIFICANT CHANGE UP (ref 30–115)
ALT FLD-CCNC: 17 U/L — SIGNIFICANT CHANGE UP (ref 0–41)
ANION GAP SERPL CALC-SCNC: 11 MMOL/L — SIGNIFICANT CHANGE UP (ref 7–14)
APPEARANCE UR: ABNORMAL
AST SERPL-CCNC: 26 U/L — SIGNIFICANT CHANGE UP (ref 0–41)
BASOPHILS # BLD AUTO: 0 K/UL — SIGNIFICANT CHANGE UP (ref 0–0.2)
BASOPHILS NFR BLD AUTO: 0 % — SIGNIFICANT CHANGE UP (ref 0–1)
BILIRUB SERPL-MCNC: 0.3 MG/DL — SIGNIFICANT CHANGE UP (ref 0.2–1.2)
BILIRUB UR-MCNC: NEGATIVE — SIGNIFICANT CHANGE UP
BUN SERPL-MCNC: 31 MG/DL — HIGH (ref 10–20)
C DIFF GDH STL QL: NEGATIVE — SIGNIFICANT CHANGE UP
C DIFF GDH STL QL: SIGNIFICANT CHANGE UP
CALCIUM SERPL-MCNC: 9.2 MG/DL — SIGNIFICANT CHANGE UP (ref 8.4–10.5)
CHLORIDE SERPL-SCNC: 114 MMOL/L — HIGH (ref 98–110)
CO2 SERPL-SCNC: 18 MMOL/L — SIGNIFICANT CHANGE UP (ref 17–32)
COLOR SPEC: ABNORMAL
CREAT SERPL-MCNC: 1.2 MG/DL — SIGNIFICANT CHANGE UP (ref 0.7–1.5)
DIFF PNL FLD: ABNORMAL
EGFR: 45 ML/MIN/1.73M2 — LOW
EGFR: 45 ML/MIN/1.73M2 — LOW
EOSINOPHIL # BLD AUTO: 0 K/UL — SIGNIFICANT CHANGE UP (ref 0–0.7)
EOSINOPHIL NFR BLD AUTO: 0 % — SIGNIFICANT CHANGE UP (ref 0–8)
GLUCOSE SERPL-MCNC: 91 MG/DL — SIGNIFICANT CHANGE UP (ref 70–99)
GLUCOSE UR QL: 100 MG/DL
HCT VFR BLD CALC: 34.1 % — LOW (ref 37–47)
HGB BLD-MCNC: 10.6 G/DL — LOW (ref 12–16)
KETONES UR QL: NEGATIVE MG/DL — SIGNIFICANT CHANGE UP
LEUKOCYTE ESTERASE UR-ACNC: ABNORMAL
LIDOCAIN IGE QN: 39 U/L — SIGNIFICANT CHANGE UP (ref 7–60)
LYMPHOCYTES # BLD AUTO: 29.68 K/UL — HIGH (ref 1.2–3.4)
LYMPHOCYTES # BLD AUTO: 61.1 % — HIGH (ref 20.5–51.1)
MAGNESIUM SERPL-MCNC: 1.7 MG/DL — LOW (ref 1.8–2.4)
MCHC RBC-ENTMCNC: 28.9 PG — SIGNIFICANT CHANGE UP (ref 27–31)
MCHC RBC-ENTMCNC: 31.1 G/DL — LOW (ref 32–37)
MCV RBC AUTO: 92.9 FL — SIGNIFICANT CHANGE UP (ref 81–99)
MONOCYTES # BLD AUTO: 1.07 K/UL — HIGH (ref 0.1–0.6)
MONOCYTES NFR BLD AUTO: 2.2 % — SIGNIFICANT CHANGE UP (ref 1.7–9.3)
NEUTROPHILS # BLD AUTO: 16.76 K/UL — HIGH (ref 1.4–6.5)
NEUTROPHILS NFR BLD AUTO: 34.5 % — LOW (ref 42.2–75.2)
NITRITE UR-MCNC: NEGATIVE — SIGNIFICANT CHANGE UP
PH UR: 6.5 — SIGNIFICANT CHANGE UP (ref 5–8)
PLATELET # BLD AUTO: 130 K/UL — SIGNIFICANT CHANGE UP (ref 130–400)
PMV BLD: 10.8 FL — HIGH (ref 7.4–10.4)
POTASSIUM SERPL-MCNC: 4.3 MMOL/L — SIGNIFICANT CHANGE UP (ref 3.5–5)
POTASSIUM SERPL-SCNC: 4.3 MMOL/L — SIGNIFICANT CHANGE UP (ref 3.5–5)
PROT SERPL-MCNC: 5.4 G/DL — LOW (ref 6–8)
PROT UR-MCNC: 300 MG/DL
RBC # BLD: 3.67 M/UL — LOW (ref 4.2–5.4)
RBC # FLD: 15.9 % — HIGH (ref 11.5–14.5)
SODIUM SERPL-SCNC: 143 MMOL/L — SIGNIFICANT CHANGE UP (ref 135–146)
SP GR SPEC: 1.03 — SIGNIFICANT CHANGE UP (ref 1–1.03)
UROBILINOGEN FLD QL: 0.2 MG/DL — SIGNIFICANT CHANGE UP (ref 0.2–1)
WBC # BLD: 48.57 K/UL — CRITICAL HIGH (ref 4.8–10.8)
WBC # FLD AUTO: 48.57 K/UL — CRITICAL HIGH (ref 4.8–10.8)

## 2025-07-08 PROCEDURE — 83631 LACTOFERRIN FECAL (QUANT): CPT

## 2025-07-08 PROCEDURE — 97110 THERAPEUTIC EXERCISES: CPT | Mod: CQ,GP

## 2025-07-08 PROCEDURE — 82607 VITAMIN B-12: CPT

## 2025-07-08 PROCEDURE — 87507 IADNA-DNA/RNA PROBE TQ 12-25: CPT

## 2025-07-08 PROCEDURE — 88312 SPECIAL STAINS GROUP 1: CPT

## 2025-07-08 PROCEDURE — 83986 ASSAY PH BODY FLUID NOS: CPT

## 2025-07-08 PROCEDURE — 85027 COMPLETE CBC AUTOMATED: CPT

## 2025-07-08 PROCEDURE — 87040 BLOOD CULTURE FOR BACTERIA: CPT

## 2025-07-08 PROCEDURE — 82705 FATS/LIPIDS FECES QUAL: CPT

## 2025-07-08 PROCEDURE — 82306 VITAMIN D 25 HYDROXY: CPT

## 2025-07-08 PROCEDURE — 83550 IRON BINDING TEST: CPT

## 2025-07-08 PROCEDURE — 84466 ASSAY OF TRANSFERRIN: CPT

## 2025-07-08 PROCEDURE — 87015 SPECIMEN INFECT AGNT CONCNTJ: CPT

## 2025-07-08 PROCEDURE — 87328 CRYPTOSPORIDIUM AG IA: CPT

## 2025-07-08 PROCEDURE — 84436 ASSAY OF TOTAL THYROXINE: CPT

## 2025-07-08 PROCEDURE — 82746 ASSAY OF FOLIC ACID SERUM: CPT

## 2025-07-08 PROCEDURE — 86364 TISS TRNSGLTMNASE EA IG CLAS: CPT

## 2025-07-08 PROCEDURE — 88305 TISSUE EXAM BY PATHOLOGIST: CPT

## 2025-07-08 PROCEDURE — 80053 COMPREHEN METABOLIC PANEL: CPT

## 2025-07-08 PROCEDURE — 97162 PT EVAL MOD COMPLEX 30 MIN: CPT | Mod: GP

## 2025-07-08 PROCEDURE — 84480 ASSAY TRIIODOTHYRONINE (T3): CPT

## 2025-07-08 PROCEDURE — 99285 EMERGENCY DEPT VISIT HI MDM: CPT | Mod: FS

## 2025-07-08 PROCEDURE — 83993 ASSAY FOR CALPROTECTIN FECAL: CPT

## 2025-07-08 PROCEDURE — 83735 ASSAY OF MAGNESIUM: CPT

## 2025-07-08 PROCEDURE — 74177 CT ABD & PELVIS W/CONTRAST: CPT | Mod: 26

## 2025-07-08 PROCEDURE — 84590 ASSAY OF VITAMIN A: CPT

## 2025-07-08 PROCEDURE — 87206 SMEAR FLUORESCENT/ACID STAI: CPT

## 2025-07-08 PROCEDURE — 82653 EL-1 FECAL QUANTITATIVE: CPT

## 2025-07-08 PROCEDURE — 80048 BASIC METABOLIC PNL TOTAL CA: CPT

## 2025-07-08 PROCEDURE — 84999 UNLISTED CHEMISTRY PROCEDURE: CPT

## 2025-07-08 PROCEDURE — 97530 THERAPEUTIC ACTIVITIES: CPT | Mod: GP

## 2025-07-08 PROCEDURE — 84446 ASSAY OF VITAMIN E: CPT

## 2025-07-08 PROCEDURE — 86140 C-REACTIVE PROTEIN: CPT

## 2025-07-08 PROCEDURE — 85730 THROMBOPLASTIN TIME PARTIAL: CPT

## 2025-07-08 PROCEDURE — 83540 ASSAY OF IRON: CPT

## 2025-07-08 PROCEDURE — 84443 ASSAY THYROID STIM HORMONE: CPT

## 2025-07-08 PROCEDURE — 84597 ASSAY OF VITAMIN K: CPT

## 2025-07-08 PROCEDURE — 76856 US EXAM PELVIC COMPLETE: CPT

## 2025-07-08 PROCEDURE — 82728 ASSAY OF FERRITIN: CPT

## 2025-07-08 PROCEDURE — 87637 SARSCOV2&INF A&B&RSV AMP PRB: CPT

## 2025-07-08 PROCEDURE — 84100 ASSAY OF PHOSPHORUS: CPT

## 2025-07-08 PROCEDURE — 97116 GAIT TRAINING THERAPY: CPT | Mod: GP

## 2025-07-08 PROCEDURE — 84439 ASSAY OF FREE THYROXINE: CPT

## 2025-07-08 PROCEDURE — 85610 PROTHROMBIN TIME: CPT

## 2025-07-08 PROCEDURE — 36415 COLL VENOUS BLD VENIPUNCTURE: CPT

## 2025-07-08 PROCEDURE — 87116 MYCOBACTERIA CULTURE: CPT

## 2025-07-08 PROCEDURE — 85652 RBC SED RATE AUTOMATED: CPT

## 2025-07-08 PROCEDURE — 85025 COMPLETE CBC W/AUTO DIFF WBC: CPT

## 2025-07-08 RX ORDER — MAGNESIUM SULFATE 500 MG/ML
2 SYRINGE (ML) INJECTION ONCE
Refills: 0 | Status: COMPLETED | OUTPATIENT
Start: 2025-07-08 | End: 2025-07-08

## 2025-07-08 RX ORDER — AZTREONAM 2 G/1
2000 INJECTION, POWDER, LYOPHILIZED, FOR SOLUTION INTRAMUSCULAR; INTRAVENOUS ONCE
Refills: 0 | Status: COMPLETED | OUTPATIENT
Start: 2025-07-08 | End: 2025-07-08

## 2025-07-08 RX ORDER — SODIUM CHLORIDE 9 G/1000ML
1000 INJECTION, SOLUTION INTRAVENOUS ONCE
Refills: 0 | Status: COMPLETED | OUTPATIENT
Start: 2025-07-08 | End: 2025-07-08

## 2025-07-08 RX ADMIN — Medication 25 GRAM(S): at 19:20

## 2025-07-08 RX ADMIN — AZTREONAM 100 MILLIGRAM(S): 2 INJECTION, POWDER, LYOPHILIZED, FOR SOLUTION INTRAMUSCULAR; INTRAVENOUS at 21:49

## 2025-07-08 RX ADMIN — SODIUM CHLORIDE 1000 MILLILITER(S): 9 INJECTION, SOLUTION INTRAVENOUS at 14:56

## 2025-07-08 NOTE — ED PROVIDER NOTE - OBJECTIVE STATEMENT
85-year-old female with [facility] cervical cancer, arthritis presents to the ED for evaluation of intermittent diarrhea since January.  Patient states over time she eats she has diarrhea.  As per family at bedside, patient has been in and out of hospitals since January 2025.  Patient also reports she has been having frequent UTIs, and reports dysuria.  Denies fevers, chills, chest pain, shortness of breath, nausea, vomiting, or hematuria. 85-year-old female with PMH cervical cancer, arthritis, suspected CLL (follows w/ Odaimi)  presents to the ED for evaluation of intermittent diarrhea since January.  Patient states over time she eats she has diarrhea.  As per family at bedside, patient has been in and out of hospitals since January 2025.  Patient also reports she has been having frequent UTIs, and reports dysuria.  Denies fevers, chills, chest pain, shortness of breath, nausea, vomiting, or hematuria.

## 2025-07-08 NOTE — ED PROVIDER NOTE - CLINICAL SUMMARY MEDICAL DECISION MAKING FREE TEXT BOX
Patient is an 85-year-old female who was seen and examined with the resident.  The patient has a past medical history of cervical cancer and arthritis.  The patient is complaining of intermittent diarrhea since January.  She states everything she eats comes out seconds later.  She has been in and out of hospitals since January.  She was admitted to our [facility] in January 2025 for the diarrhea.  She is also been having frequent UTIs and is can planing of some bladder pain.  The family is concerned she is dehydrated.    On our exam the patient is alert and oriented x 3 in no acute distress.  Abdomen soft with some suprapubic and lower abdominal tenderness.  Skin dry.    Impression: Diarrhea and abdominal pain    Plan: Will check labs, UA, IV hydration, CT abdomen pelvis

## 2025-07-08 NOTE — ED PROVIDER NOTE - IV ALTEPLASE INCLUSION HIDDEN
show [No Acute Distress] : no acute distress [Well Nourished] : well nourished [Well Developed] : well developed [Well-Appearing] : well-appearing [Normal Sclera/Conjunctiva] : normal sclera/conjunctiva [PERRL] : pupils equal round and reactive to light [EOMI] : extraocular movements intact [Normal Outer Ear/Nose] : the outer ears and nose were normal in appearance [Normal Oropharynx] : the oropharynx was normal [No JVD] : no jugular venous distention [No Lymphadenopathy] : no lymphadenopathy [Supple] : supple [Thyroid Normal, No Nodules] : the thyroid was normal and there were no nodules present [No Respiratory Distress] : no respiratory distress  [No Accessory Muscle Use] : no accessory muscle use [Clear to Auscultation] : lungs were clear to auscultation bilaterally [Normal Rate] : normal rate  [Regular Rhythm] : with a regular rhythm [Normal S1, S2] : normal S1 and S2 [No Murmur] : no murmur heard [Soft] : abdomen soft [Non Tender] : non-tender [Non-distended] : non-distended [No Masses] : no abdominal mass palpated [No HSM] : no HSM [Normal Bowel Sounds] : normal bowel sounds [No CVA Tenderness] : no CVA  tenderness [No Spinal Tenderness] : no spinal tenderness [Grossly Normal Strength/Tone] : grossly normal strength/tone [No Rash] : no rash [Coordination Grossly Intact] : coordination grossly intact [No Focal Deficits] : no focal deficits [Normal Gait] : normal gait [Deep Tendon Reflexes (DTR)] : deep tendon reflexes were 2+ and symmetric [Normal Affect] : the affect was normal [Normal Insight/Judgement] : insight and judgment were intact [No Joint Swelling] : no joint swelling [de-identified] : Bony growth, 0.5-1.0 cm, hard to touch at medial aspect of foot bilaterally, non tender

## 2025-07-08 NOTE — ED PROVIDER NOTE - PHYSICAL EXAMINATION
VITAL SIGNS: I have reviewed nursing notes and confirm.  CONSTITUTIONAL: well-appearing, non-toxic, NAD  SKIN: Warm dry, normal skin turgor  HEAD: NCAT  EYES: EOMI, PERRL  ENT: Moist mucous membranes  NECK: Supple; non tender. Full ROM. No cervical LAD  CARD: RRR, no murmurs, rubs or gallops  RESP: clear to ausculation b/l.  No rales, rhonchi, or wheezing.  ABD: soft,  lower abdominal tenderness, non-distended, no rebound or guarding. No CVA tenderness  EXT: Full ROM, no edema, no calf tenderness  NEURO: Awake and alert. No FND  PSYCH: Cooperative, appropriate.

## 2025-07-08 NOTE — ED PROVIDER NOTE - ATTENDING CONTRIBUTION TO CARE
Patient is an 85-year-old female who was seen and examined with the resident.  The patient has a past medical history of cervical cancer and arthritis.  The patient is complaining of intermittent diarrhea since January.  She states everything she eats comes out seconds later.  She has been in and out of hospitals since January.  She was admitted to our [facility] in January 2025 for the diarrhea.  She is also been having frequent UTIs and is can planing of some bladder pain.  The family is concerned she is dehydrated.    On our exam the patient is alert and oriented x 3 in no acute distress.  Abdomen soft with some suprapubic and lower abdominal tenderness.  Skin dry.    Impression: Diarrhea and abdominal pain    Plan: Will check labs, UA, IV hydration, CT abdomen pelvis

## 2025-07-08 NOTE — ED PROVIDER NOTE - CARE PLAN
1 Principal Discharge DX:	Urinary tract infection  Secondary Diagnosis:	Hypomagnesemia  Secondary Diagnosis:	Diarrhea

## 2025-07-08 NOTE — ED PROVIDER NOTE - PROGRESS NOTE DETAILS
ck: patient endorsed to me pending ct read  patient with hydronephrosis, +ua started on abx   patient to be admitted for diarrhea, hypomagnesemia and uti Received pt from Dr Du at 1600 awaiting CT and cdiff after she presented with chronic diarrhea, difficulty urinating, and weight loss since January. She is nontoxic appearing on my eval and eating crackers. Abd soft, NT. Labs with leukocytosis to 48 c/w known poss CLL. Received pt from Dr Du at 1600 awaiting CT and cdiff after she presented with chronic diarrhea, difficulty urinating, and weight loss since January. She is nontoxic appearing on my eval and eating crackers. Abd soft, NT. Labs with leukocytosis to 48 c/w known poss CLL., UA suggestive of UTI, abx given. CT with bladder wall enhancement and b/l hydronephrosis without visualized stones. Delayed scan (noncon) was rec and ordered, medicine team to follow. Will admit for further eval.

## 2025-07-09 LAB
ALBUMIN SERPL ELPH-MCNC: 3.4 G/DL — LOW (ref 3.5–5.2)
ALP SERPL-CCNC: 65 U/L — SIGNIFICANT CHANGE UP (ref 30–115)
ALT FLD-CCNC: 15 U/L — SIGNIFICANT CHANGE UP (ref 0–41)
ANION GAP SERPL CALC-SCNC: 14 MMOL/L — SIGNIFICANT CHANGE UP (ref 7–14)
AST SERPL-CCNC: 24 U/L — SIGNIFICANT CHANGE UP (ref 0–41)
BASOPHILS # BLD AUTO: 0.04 K/UL — SIGNIFICANT CHANGE UP (ref 0–0.2)
BASOPHILS NFR BLD AUTO: 0.1 % — SIGNIFICANT CHANGE UP (ref 0–1)
BILIRUB SERPL-MCNC: <0.2 MG/DL — SIGNIFICANT CHANGE UP (ref 0.2–1.2)
BUN SERPL-MCNC: 21 MG/DL — HIGH (ref 10–20)
CALCIUM SERPL-MCNC: 8.8 MG/DL — SIGNIFICANT CHANGE UP (ref 8.4–10.5)
CHLORIDE SERPL-SCNC: 110 MMOL/L — SIGNIFICANT CHANGE UP (ref 98–110)
CO2 SERPL-SCNC: 17 MMOL/L — SIGNIFICANT CHANGE UP (ref 17–32)
CREAT SERPL-MCNC: 1.1 MG/DL — SIGNIFICANT CHANGE UP (ref 0.7–1.5)
EGFR: 50 ML/MIN/1.73M2 — LOW
EGFR: 50 ML/MIN/1.73M2 — LOW
EOSINOPHIL # BLD AUTO: 0.06 K/UL — SIGNIFICANT CHANGE UP (ref 0–0.7)
EOSINOPHIL NFR BLD AUTO: 0.2 % — SIGNIFICANT CHANGE UP (ref 0–8)
FLUAV AG NPH QL: SIGNIFICANT CHANGE UP
FLUBV AG NPH QL: SIGNIFICANT CHANGE UP
GLUCOSE SERPL-MCNC: 112 MG/DL — HIGH (ref 70–99)
HCT VFR BLD CALC: 36.1 % — LOW (ref 37–47)
HGB BLD-MCNC: 11.4 G/DL — LOW (ref 12–16)
IMM GRANULOCYTES NFR BLD AUTO: 0.2 % — SIGNIFICANT CHANGE UP (ref 0.1–0.3)
LYMPHOCYTES # BLD AUTO: 32 K/UL — HIGH (ref 1.2–3.4)
LYMPHOCYTES # BLD AUTO: 83.6 % — HIGH (ref 20.5–51.1)
MAGNESIUM SERPL-MCNC: 2 MG/DL — SIGNIFICANT CHANGE UP (ref 1.8–2.4)
MCHC RBC-ENTMCNC: 28.6 PG — SIGNIFICANT CHANGE UP (ref 27–31)
MCHC RBC-ENTMCNC: 31.6 G/DL — LOW (ref 32–37)
MCV RBC AUTO: 90.5 FL — SIGNIFICANT CHANGE UP (ref 81–99)
MONOCYTES # BLD AUTO: 1.43 K/UL — HIGH (ref 0.1–0.6)
MONOCYTES NFR BLD AUTO: 3.7 % — SIGNIFICANT CHANGE UP (ref 1.7–9.3)
NEUTROPHILS # BLD AUTO: 4.66 K/UL — SIGNIFICANT CHANGE UP (ref 1.4–6.5)
NEUTROPHILS NFR BLD AUTO: 12.2 % — LOW (ref 42.2–75.2)
NRBC BLD AUTO-RTO: 0 /100 WBCS — SIGNIFICANT CHANGE UP (ref 0–0)
PLATELET # BLD AUTO: 102 K/UL — LOW (ref 130–400)
PMV BLD: 12.2 FL — HIGH (ref 7.4–10.4)
POTASSIUM SERPL-MCNC: 3.5 MMOL/L — SIGNIFICANT CHANGE UP (ref 3.5–5)
POTASSIUM SERPL-SCNC: 3.5 MMOL/L — SIGNIFICANT CHANGE UP (ref 3.5–5)
PROT SERPL-MCNC: 5.1 G/DL — LOW (ref 6–8)
RBC # BLD: 3.99 M/UL — LOW (ref 4.2–5.4)
RBC # FLD: 15.8 % — HIGH (ref 11.5–14.5)
RSV RNA NPH QL NAA+NON-PROBE: SIGNIFICANT CHANGE UP
SARS-COV-2 RNA SPEC QL NAA+PROBE: SIGNIFICANT CHANGE UP
SODIUM SERPL-SCNC: 141 MMOL/L — SIGNIFICANT CHANGE UP (ref 135–146)
SOURCE RESPIRATORY: SIGNIFICANT CHANGE UP
WBC # BLD: 38.26 K/UL — HIGH (ref 4.8–10.8)
WBC # FLD AUTO: 38.26 K/UL — HIGH (ref 4.8–10.8)

## 2025-07-09 PROCEDURE — 99223 1ST HOSP IP/OBS HIGH 75: CPT

## 2025-07-09 PROCEDURE — 99223 1ST HOSP IP/OBS HIGH 75: CPT | Mod: FS

## 2025-07-09 RX ORDER — TAMSULOSIN HYDROCHLORIDE 0.4 MG/1
0.4 CAPSULE ORAL AT BEDTIME
Refills: 0 | Status: DISCONTINUED | OUTPATIENT
Start: 2025-07-09 | End: 2025-07-18

## 2025-07-09 RX ORDER — HEPARIN SODIUM 1000 [USP'U]/ML
5000 INJECTION INTRAVENOUS; SUBCUTANEOUS EVERY 8 HOURS
Refills: 0 | Status: DISCONTINUED | OUTPATIENT
Start: 2025-07-09 | End: 2025-07-13

## 2025-07-09 RX ORDER — LOPERAMIDE HCL 1 MG/7.5ML
2 SOLUTION ORAL
Refills: 0 | Status: DISCONTINUED | OUTPATIENT
Start: 2025-07-09 | End: 2025-07-11

## 2025-07-09 RX ORDER — HALOPERIDOL 10 MG/1
5 TABLET ORAL ONCE
Refills: 0 | Status: COMPLETED | OUTPATIENT
Start: 2025-07-09 | End: 2025-07-09

## 2025-07-09 RX ORDER — SODIUM BICARBONATE 1 MEQ/ML
650 SYRINGE (ML) INTRAVENOUS
Refills: 0 | Status: DISCONTINUED | OUTPATIENT
Start: 2025-07-09 | End: 2025-07-11

## 2025-07-09 RX ORDER — CEFTRIAXONE 500 MG/1
1000 INJECTION, POWDER, FOR SOLUTION INTRAMUSCULAR; INTRAVENOUS EVERY 24 HOURS
Refills: 0 | Status: COMPLETED | OUTPATIENT
Start: 2025-07-09 | End: 2025-07-11

## 2025-07-09 RX ORDER — HALOPERIDOL 10 MG/1
5 TABLET ORAL ONCE
Refills: 0 | Status: DISCONTINUED | OUTPATIENT
Start: 2025-07-09 | End: 2025-07-16

## 2025-07-09 RX ORDER — HEPARIN SODIUM 1000 [USP'U]/ML
5000 INJECTION INTRAVENOUS; SUBCUTANEOUS EVERY 8 HOURS
Refills: 0 | Status: DISCONTINUED | OUTPATIENT
Start: 2025-07-09 | End: 2025-07-09

## 2025-07-09 RX ORDER — SODIUM CHLORIDE 9 G/1000ML
1000 INJECTION, SOLUTION INTRAVENOUS
Refills: 0 | Status: DISCONTINUED | OUTPATIENT
Start: 2025-07-09 | End: 2025-07-10

## 2025-07-09 RX ADMIN — HEPARIN SODIUM 5000 UNIT(S): 1000 INJECTION INTRAVENOUS; SUBCUTANEOUS at 15:34

## 2025-07-09 NOTE — H&P ADULT - SOCIAL HISTORY
Implemented All Universal Safety Interventions:  [place] to call system. Call bell, personal items and telephone within reach. Instruct patient to call for assistance. Room bathroom lighting operational. Non-slip footwear when patient is off stretcher. Physically safe environment: no spills, clutter or unnecessary equipment. Stretcher in lowest position, wheels locked, appropriate side rails in place. No

## 2025-07-09 NOTE — H&P ADULT - HISTORY OF PRESENT ILLNESS
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  Patient states she has up to 5 episodes of loose stools per day, few episodes that wake her up at night. Diarhhea is non bloodly and consists of soft stool, no mucus. Not associated with bloating or cramping abdominal pain. Patient also reports she has been having frequent UTIs, and currently reports dysuria.  Denies fevers, chills, chest pain, shortness of breath, nausea, vomiting, or hematuria    Vitals in the ED were unrearkable  Labs significant for 48k(lymphocyte predominant), Hb 10.7(baseline 11), creat 1.2 (baseline 0.9), UA positive    CTAP : There is moderate right and mild left hydronephrosis and hydroureter   to the level of the mid pelvis. No ureteral stones are identified. A 2-3   hour delayed CT scan, allowing time for contrast to extend through the   ureters, may be helpful to further delineate the site of obstruction. (No   additional contrast is necessary.)    2. Splenomegaly (16 cm in length).   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  Patient states she has up to 5 episodes of loose stools per day, few episodes that wake her up at night, episodes also occur while shes fasting. Diarrhea is non bloodly and consists of soft stool, no mucus. Not associated with bloating or cramping abdominal pain. Patient also reports she has been having frequent UTIs over the past few months, and currently reports dysuria.  Denies fevers, chills, chest pain, shortness of breath, nausea, vomiting, or hematuria    Vitals in the ED were unremarkable  Labs significant for 48k(lymphocyte predominant), Hb 10.7(baseline 11), creat 1.2 (baseline 0.9), UA positive    CTAP : There is moderate right and mild left hydronephrosis and hydroureter   to the level of the mid pelvis. No ureteral stones are identified. A 2-3   hour delayed CT scan, allowing time for contrast to extend through the   ureters, may be helpful to further delineate the site of obstruction. (No   additional contrast is necessary.)    2. Splenomegaly (16 cm in length).

## 2025-07-09 NOTE — H&P ADULT - TIME BILLING
I have spent 75 minutes of time on the encounter which excludes teaching time and/or separately reported services    time spent on review of labs, imaging studies, old records, obtaining history, personally examining patient, counselling and communicating with patient, entering orders for medications/tests/etc, discussions with other health care providers, documentation in electronic health records, independent interpretation of labs, imaging/procedure results and care coordination.

## 2025-07-09 NOTE — H&P ADULT - NSHPLABSRESULTS_GEN_ALL_CORE
Spoke to patient in regards to abnormal labs.    CBC Full  -  ( 09 Jul 2025 18:19 )  WBC Count : 38.26 K/uL  Hemoglobin : 11.4 g/dL  Hematocrit : 36.1 %  Platelet Count - Automated : 102 K/uL  Mean Cell Volume : 90.5 fL  Mean Cell Hemoglobin : 28.6 pg  Mean Cell Hemoglobin Concentration : 31.6 g/dL  Auto Neutrophil # : x  Auto Lymphocyte # : x  Auto Monocyte # : x  Auto Eosinophil # : x  Auto Basophil # : x  Auto Neutrophil % : x  Auto Lymphocyte % : x  Auto Monocyte % : x  Auto Eosinophil % : x  Auto Basophil % : x    BMP:    07-09 @ 18:19    Blood Urea Nitrogen - 21  Calcium - 8.8  Carbond Dioxide - 17  Chloride - 110  Creatinine - 1.1  Glucose - 112  Potassium - 3.5  Sodium - 141      Hemoglobin A1c -     Urine Culture:

## 2025-07-09 NOTE — PROGRESS NOTE ADULT - ASSESSMENT
85-year-old female with PMH cervical cancer (s/p treatment 10 years ago), neurogenic bladder, arthritis, and suspected CLL (follows w/ Odaimi) who presents to the ED for evaluation of intermittent diarrhea since January.  Patient states she has up to 5 episodes of loose stools per day, few episodes that wake her up at night, episodes also occur while shes fasting. Diarrhea is non bloodly and consists of soft stool, no mucus. Not associated with bloating or cramping abdominal pain. Patient also reports she has been having frequent UTIs over the past few months, and currently reports dysuria.  Denies fevers, chills, chest pain, shortness of breath, nausea, vomiting, or hematuria    PAST MEDICAL & SURGICAL HISTORY  Arthritis    Cervical cancer      SOCIAL HISTORY:  Social History:      ALLERGIES:  ampicillin (Rash)  levofloxacin (Rash)    MEDICATIONS:  STANDING MEDICATIONS  cefTRIAXone   IVPB 1000 milliGRAM(s) IV Intermittent every 24 hours  heparin   Injectable 5000 Unit(s) SubCutaneous every 8 hours  lactated ringers. 1000 milliLiter(s) IV Continuous <Continuous>  tamsulosin 0.4 milliGRAM(s) Oral at bedtime    PRN MEDICATIONS  haloperidol    Injectable 5 milliGRAM(s) IntraMuscular once PRN    VITALS:   T(F): 97.6  HR: 59  BP: 112/54  RR: 18  SpO2: 94%    PHYSICAL EXAM:  GENERAL: NAD, lying in bed comfortably  HEAD:  Atraumatic, Normocephalic  EYES: EOMI, PERRLA, conjunctiva and sclera clear  ENT: Moist mucous membranes  NECK: Supple, No JVD  CHEST/LUNG: Clear to auscultation bilaterally; No rales, rhonchi, wheezing, or rubs. Unlabored respirations  HEART: Regular rate and rhythm; No murmurs, rubs, or gallops  ABDOMEN: BSx4; Soft, nontender, nondistended  EXTREMITIES:  2+ Peripheral Pulses, brisk capillary refill. No clubbing, cyanosis, or edema  NERVOUS SYSTEM:  A&Ox3, no focal deficits   SKIN: No rashes or lesions      LABS:                        10.6   48.57 )-----------( 130      ( 2025 14:55 )             34.1     07-08    143  |  114[H]  |  31[H]  ----------------------------<  91  4.3   |  18  |  1.2    Ca    9.2      2025 14:55  Mg     1.7         TPro  5.4[L]  /  Alb  3.6  /  TBili  0.3  /  DBili  x   /  AST  26  /  ALT  17  /  AlkPhos  62        Urinalysis Basic - ( 2025 20:40 )    Color: Red / Appearance: Cloudy / S.028 / pH: x  Gluc: x / Ketone: x  / Bili: Negative / Urobili: 0.2 mg/dL   Blood: x / Protein: 300 mg/dL / Nitrite: Negative   Leuk Esterase: Moderate / RBC: >1900 /HPF /  /HPF   Sq Epi: x / Non Sq Epi: 1 /HPF / Bacteria: Negative /HPF            Urinalysis with Rflx Culture (collected 2025 20:40)          RADIOLOGY:  CXR      CT  CT Abdomen and Pelvis w/ IV Cont:   ACC: [id number] EXAM:  CT ABDOMEN AND PELVIS IC   ORDERED BY: MARGARITA TALBERT     PROCEDURE DATE:  2025          INTERPRETATION:  CLINICAL INFORMATION: Abdominal pain. Diarrhea. WBC   48.57    PMHx of suspected CLL. Post cervical cancer s/p radiation >10   years ago.    COMPARISON: CT scan of the abdomen and pelvis dated 2025    CONTRAST/COMPLICATIONS:  IV Contrast:   The patient received 95 ml of Omnipaque 350 with 5 ml   discarded.  Oral Contrast:  None  Complications:  None reported at time of study completion    PROCEDURE:  CT of the Abdomen and Pelvis was performed.  Sagittal and coronal reformats were performed.    FINDINGS:    TUBES AND LINES: None.  LOWER CHEST: There are mild reticular opacities at the lung bases. No   pleural or pericardial effusion.    LIVER: The liver is normal in size with no evidence of solid mass. The   portal vein is patent.  BILE DUCTS: Normal caliber.  GALLBLADDER: Post cholecystectomy    SPLEEN: Splenomegaly (16 cm in length). Splenic hypodensity too small for   further evaluation.  PANCREAS: The pancreas is mildly atrophic. No evidence of mass or   pancreatitis.  ADRENALS: Within normal limits.  KIDNEYS/URETERS: There is symmetric renal enhancement. There is moderate   right and mild left hydronephrosis and hydroureter to the level of the   mid pelvis. No ureteral stones are identified.    BLADDER: Bladder wall enhancement is again noted.  REPRODUCTIVE ORGANS: Post hysterectomy    BOWEL: Surgical clips and anastomosis noted in the RLQ. No evidence of   bowel obstruction, colitis, or inflammatory process. Normal caliber   appendix.  PERITONEUM/RETROPERITONEUM: No ascites. No free intraperitoneal air.  VESSELS: Aortoiliac calcifications are noted with no evidence of   abdominal aortic aneurysm. 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  LYMPH NODES: No abdominal or pelvic adenopathy.  BONES: Degenerative changes of the spine.  ABDOMINAL WALL: Unremarkable    IMPRESSION:    1. There is moderate right and mild left hydronephrosis and hydroureter   to the level of the mid pelvis. No ureteral stones are identified. A 2-3   hour delayed CT scan, allowing time for contrast to extend through the   ureters, may be helpful to further delineate the site of obstruction. (No   additional contrast is necessary.)    2. Splenomegaly (16 cm in length).    3. Surgical clips and anastomosis noted in the RLQ.    --- End of Report ---            SHER IRENE MD; Attending Interventional Radiologist  This document has been electronically signed. 2025  7:40PM (25 @ 16:46)  Vitals in the ED were unremarkable  Labs significant for 48k(lymphocyte predominant), Hb 10.7(baseline 11), creat 1.2 (baseline 0.9), UA positive    CTAP : There is moderate right and mild left hydronephrosis and hydroureter   to the level of the mid pelvis. No ureteral stones are identified. A 2-3   hour delayed CT scan, allowing time for contrast to extend through the   ureters, may be helpful to further delineate the site of obstruction. (No   additional contrast is necessary.)    2. Splenomegaly (16 cm in length).    #Chronic Diarrhea(likely secretory)  -HD stable, appears cachectic  -No SIRs  -4-5 episodes/ day of soft watery stools. 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 Start gentle hydration  -Currently making urine. Check bladder scan    #Cystitis  #Hematuria  #Neurogenic bladder  #Chronic b/l hydro  -UA positive, fu UCX  -CTAP: B/l hydro, no stone  -Fu repeat CT for delayed contrast clearance  -Seen by urologist Dr. bonilla in [facility] who reccomended bee  -Pt refusing bee in ED  -s/p aztreonam in ED, pt states she got a rash to ampicillin. Can start CTX in am  -Fu ID cs    #Anemia  -Hb around baseline   -Check iron, b12 , folate    #CLL   -WBC 48k prev around 40s  -Splenomegaly on CT  -Fish abnormal- 13q deletions  -Follows with dr. Montgomery  -Monitor for now as wbc count has not doubled in 6 months    #Chronic LE edema  - +1pedal edema. unlikely DVT  -duplex neg in [facility]    Urology consult  A) Right hydronephrosis with seems to occur when her bladder is full.  Mild left hydronephrosis  urinary/fecal incontinence  LUTS  microhematuria  UTI  Splenomegaly  CLL    P) Treat UTI as per ID  consider Bee catheter to decompress the bladder, if she refuses then  consider Flomax if not contra indicated.  renal/bladder sonogram in 48 hrs  consider Mag 3 renal scan with lasix washout to evaluate possible obstruction  OP f/u for cystoscopy when medically optimal  no acute urologic intervention at this time  will d/w attending    ID consult    1. There is moderate right and mild left hydronephrosis and hydroureter   to the level of the mid pelvis. No ureteral stones are identified. A 2-3   hour delayed CT scan, allowing time for contrast to extend through the   ureters, may be helpful to further delineate the site of obstruction. (No   additional contrast is necessary.)    2. Splenomegaly (16 cm in length).    3. Surgical clips and anastomosis noted in the RLQ.

## 2025-07-09 NOTE — H&P ADULT - ASSESSMENT
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 No association with lactose intake  -No h/o NSAID or PPI use  -Cdiff neg  -Fu GI PCR, stool cx, Bcx, ova parasites, stool osm (secretory vs osmotic diarrhea)  -Can start imodium prn if infectious work up neg  -Monitor creat (baseline 0.9). Start gentle hydration    #Cystitis  #Hematuria  -UA positive, fu UCX  -CTAP: B/l hydro, no stone  -Fu repeat CT for delayed contrast clearance  -s/p aztreonam in ED  -Fu ID cs    #Anemia  -Hb around baseline   -Check iron, b12 , folate    #CLL   -WBC 48k prev around 40s  -Fish abnormal- 13q deletions  -Follows with dr. Montgomery  -Monitor for now as wbc count has not doubled in 6 months      #Diet: regular  #DVT Prophylaxis: Heparin SubQ q8H  #Activity: Ambulate as tolerated     #Chronic Diarrhea  -HD stable, appears cachectic  -No SIRs  -4-5 episodes/ day of soft watery stools. 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 Start gentle hydration    #Cystitis  #Hematuria  #Neurogenic bladder  #Chronic b/l hydro  -UA positive, fu UCX  -CTAP: B/l hydro, no stone  -Fu repeat CT for delayed contrast clearance  -Seen by urologist Dr. bonilla in [facility] who reccomended bee  -Pt refusing bee in ED  -s/p aztreonam in ED  -Fu ID cs    #Anemia  -Hb around baseline   -Check iron, b12 , folate    #CLL   -WBC 48k prev around 40s  -Fish abnormal- 13q deletions  -Follows with dr. Montgomery  -Monitor for now as wbc count has not doubled in 6 months    #Chronic LE edema  - +1pedal edema. unlikely DVT  -duplex neg in jan    #Diet: regular  #DVT Prophylaxis: Heparin SubQ q8H  #Activity: Ambulate as tolerated     #Chronic Diarrhea(likely secretory)  -HD stable, appears cachectic  -No SIRs  -4-5 episodes/ day of soft watery stools. 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 Start gentle hydration    #Cystitis  #Hematuria  #Neurogenic bladder  #Chronic b/l hydro  -UA positive, fu UCX  -CTAP: B/l hydro, no stone  -Fu repeat CT for delayed contrast clearance  -Seen by urologist Dr. bonilla in [facility] who reccomended bee  -Pt refusing bee in ED  -s/p aztreonam in ED, pt states she got a rash to ampicillin. Can start CTX in am  -Fu ID cs    #Anemia  -Hb around baseline   -Check iron, b12 , folate    #CLL   -WBC 48k prev around 40s  -Splenomegaly on CT  -Fish abnormal- 13q deletions  -Follows with dr. Montgomery  -Monitor for now as wbc count has not doubled in 6 months    #Chronic LE edema  - +1pedal edema. unlikely DVT  -duplex neg in jan    #Diet: regular  #DVT Prophylaxis: Heparin SubQ q8H  #Activity: Ambulate as tolerated     #Chronic Diarrhea(likely secretory)  -HD stable, appears cachectic  -No SIRs  -4-5 episodes/ day of soft watery stools. 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 Start gentle hydration  -Currently making urine. Check bladder scan    #Cystitis  #Hematuria  #Neurogenic bladder  #Chronic b/l hydro  -UA positive, fu UCX  -CTAP: B/l hydro, no stone  -Fu repeat CT for delayed contrast clearance  -Seen by urologist Dr. bonilla in [facility] who reccomended bee  -Pt refusing bee in ED  -s/p aztreonam in ED, pt states she got a rash to ampicillin. Can start CTX in am  -Fu ID cs    #Anemia  -Hb around baseline   -Check iron, b12 , folate    #CLL   -WBC 48k prev around 40s  -Splenomegaly on CT  -Fish abnormal- 13q deletions  -Follows with dr. Montgomery  -Monitor for now as wbc count has not doubled in 6 months    #Chronic LE edema  - +1pedal edema. unlikely DVT  -duplex neg in jan    #Diet: regular  #DVT Prophylaxis: Heparin SubQ q8H  #Activity: Ambulate as tolerated     #Chronic Diarrhea(likely secretory)  -HD stable, appears cachectic  -No SIRs  -4-5 episodes/ day of soft watery stools. 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 Start gentle hydration  -Currently making urine. Check bladder scan    #Cystitis  #Hematuria  #Neurogenic bladder  #Chronic b/l hydro  -UA positive, fu UCX  -CTAP: B/l hydro, no stone  -Fu repeat CT for delayed contrast clearance  -Seen by urologist Dr. bonilla in [facility] who reccomended bee  -Pt refusing bee in ED  -s/p aztreonam in ED, pt states she got a rash to ampicillin. Can start CTX in am  -Fu ID cs    #Anemia  -Hb around baseline   -Check iron, b12 , folate    #CLL   -WBC 48k prev around 40s  -Splenomegaly on CT  -Fish abnormal- 13q deletions  -Follows with dr. Montgomery  -Monitor for now as wbc count has not doubled in 6 months    #Chronic LE edema  - +1pedal edema. unlikely DVT  -duplex neg in jan    #Diet: regular  #DVT Prophylaxis: Heparin SubQ q8H (stop if Hb drops)  #Activity: Ambulate as tolerated     #Chronic Diarrhea(likely secretory)  -HD stable, appears cachectic  -No SIRs  -4-5 episodes/ day of soft watery stools. 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 Start gentle hydration  -Currently making urine. Check bladder scan    #Cystitis  #Hematuria  #Neurogenic bladder  #Chronic b/l hydro  -UA positive, fu UCX  -CTAP: B/l hydro, no stone  -Fu repeat CT for delayed contrast clearance  -Seen by urologist Dr. bonilla in [facility] who reccomended bee  -Pt refusing bee in ED  -s/p aztreonam in ED, pt states she got a rash to ampicillin. Can start CTX in am  -Fu ID cs    #Anemia  -Hb around baseline   -Check iron, b12 , folate    #CLL   -WBC 48k prev around 40s  -Splenomegaly on CT  -Fish abnormal- 13q deletions  -Follows with dr. Montgomery  -Monitor for now as wbc count has not doubled in 6 months    #Chronic LE edema  - +1pedal edema. unlikely DVT  -duplex neg in jan    #Diet: regular  #DVT Prophylaxis: Start Heparin SubQ q8H if Hb stable  #Activity: Ambulate as tolerated     #Chronic Diarrhea(likely secretory)  #Bicarb loss  -HD stable, appears cachectic  -No SIRs  -4-5 episodes/ day of soft watery stools. 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 Start gentle hydration  -Currently making urine. Check bladder scan    #Cystitis  #Hematuria  #Neurogenic bladder  #Chronic b/l hydro  -UA positive, fu UCX  -CTAP: B/l hydro, no stone  -Fu repeat CT for delayed contrast clearance  -Seen by urologist Dr. bonilla in [facility] who reccomended bee  -Pt refusing bee in ED  -s/p aztreonam in ED, pt states she got a rash to ampicillin. Can start CTX in am  -Fu ID cs    #Anemia  -Hb around baseline   -Check iron, b12 , folate    #CLL   -WBC 48k prev around 40s  -Splenomegaly on CT  -Fish abnormal- 13q deletions  -Follows with dr. Montgomery  -Monitor for now as wbc count has not doubled in 6 months    #Chronic LE edema  - +1pedal edema. unlikely DVT  -duplex neg in jan    #Diet: regular  #DVT Prophylaxis: Start Heparin SubQ q8H if Hb stable  #Activity: Ambulate as tolerated

## 2025-07-09 NOTE — CONSULT NOTE ADULT - SUBJECTIVE AND OBJECTIVE BOX
Urology Consult    Pt is a 86 y/o Female known to urology. Pt with hx of urinary incontinence/fecal incontinence since . Pt with recurrent  cystitis with ascending UTI since . Pt also with generalized weakness and muscle loss. Pt was referred to a neurologist  but never followed through. Pt also c/o difficulty ambulating and prone to falls. Pt with chronic elevated WBC c/w Cll.    CT Scan shows mucosal enhancement c/w cystitis. With moderate right hydronephrosis and mild left hydronephrosis with hydroureter   to the level of the mid pelvis. No ureteral stones Pt had Ladd placed but had it removed 2/2 pain.  Pt with c/o fecal incontinence as well with diarrhea with abdominal cramping. Pt was admitted for worsening SOB.  Pt voiding via prima fit clear to slightly tinged urine.    PAST MEDICAL & SURGICAL HISTORY:  Arthritis    Cervical cancer    cystitis with ascending UTI      MEDICATIONS  (STANDING):  cefTRIAXone   IVPB 1000 milliGRAM(s) IV Intermittent every 24 hours  heparin   Injectable 5000 Unit(s) SubCutaneous every 8 hours  lactated ringers. 1000 milliLiter(s) (70 mL/Hr) IV Continuous <Continuous>    MEDICATIONS  (PRN):  haloperidol    Injectable 5 milliGRAM(s) IntraMuscular once PRN agitation      Allergies    ampicillin (Rash)  levofloxacin (Rash)    SOCIAL HISTORY: No illicit drug use    FAMILY HISTORY:  non contributory to current issue    REVIEW OF SYSTEMS   [x] A ten-point review of systems was otherwise negative except as noted.    Vital Signs Last 24 Hrs  T(C): 36.4 (2025 08:03), Max: 36.8 (2025 12:56)  T(F): 97.6 (2025 08:03), Max: 98.3 (2025 12:56)  HR: 59 (2025 08:03) (59 - 70)  BP: 112/54 (2025 08:03) (110/70 - 131/60)  BP(mean): 84 (2025 23:33) (84 - 84)  RR: 18 (2025 08:03) (16 - 18)  SpO2: 94% (2025 08:03) (94% - 100%)    Parameters below as of 2025 08:03  Patient On (Oxygen Delivery Method): room air        PHYSICAL EXAM:    GEN: NAD, awake and alert.  SKIN: Good color, non diaphoretic.  RESP: Non-labored breathing. No use of accessory muscles.  ABDO: Soft, + diffuse tenderness, Non Distended, no palpable bladder, + suprapubic tenderness  BACK: No CVAT B/L  : voiding via prima fit clear yellow urine to slightly tinged.   EXT: KRUEGER x 4      I&O's Summary      LABS:                        10.6   48.57 )-----------( 130      ( 2025 14:55 )             34.1         143  |  114[H]  |  31[H]  ----------------------------<  91  4.3   |  18  |  1.2    Ca    9.2      2025 14:55  Mg     1.7         TPro  5.4[L]  /  Alb  3.6  /  TBili  0.3  /  DBili  x   /  AST  26  /  ALT  17  /  AlkPhos  62        Urinalysis Basic - ( 2025 20:40 )    Color: Red / Appearance: Cloudy / S.028 / pH: x  Gluc: x / Ketone: x  / Bili: Negative / Urobili: 0.2 mg/dL   Blood: x / Protein: 300 mg/dL / Nitrite: Negative   Leuk Esterase: Moderate / RBC: >1900 /HPF /  /HPF   Sq Epi: x / Non Sq Epi: 1 /HPF / Bacteria: Negative /HPF        Culture - Urine (25 @ 20:41)   Specimen Source: Clean Catch None  Culture Results:   >=3 organisms. Probable collection contamination.    RADIOLOGY & ADDITIONAL STUDIES:    < from: CT Abdomen and Pelvis w/ IV Cont (25 @ 16:46) >    ACC: [id number] EXAM:  CT ABDOMEN AND PELVIS IC   ORDERED BY: MARGARITA TALBERT     PROCEDURE DATE:  2025          INTERPRETATION:  CLINICAL INFORMATION: Abdominal pain. Diarrhea. WBC   48.57    PMHx of suspected CLL. Post cervical cancer s/p radiation >10   years ago.    COMPARISON: CT scan of the abdomen and pelvis dated 2025    CONTRAST/COMPLICATIONS:  IV Contrast:   The patient received 95 ml of Omnipaque 350 with 5 ml   discarded.  Oral Contrast:  None  Complications:  None reported at time of study completion    PROCEDURE:  CT of the Abdomen and Pelvis was performed.  Sagittal and coronal reformats were performed.    FINDINGS:    TUBES AND LINES: None.  LOWER CHEST: There are mild reticular opacities at the lung bases. No   pleural or pericardial effusion.    LIVER: The liver is normal in size with no evidence of solid mass. The   portal vein is patent.  BILE DUCTS: Normal caliber.  GALLBLADDER: Post cholecystectomy    SPLEEN: Splenomegaly (16 cm in length). Splenic hypodensity too small for   further evaluation.  PANCREAS: The pancreas is mildly atrophic. No evidence of mass or   pancreatitis.  ADRENALS: Within normal limits.  KIDNEYS/URETERS: There is symmetric renal enhancement. There is moderate   right and mild left hydronephrosis and hydroureter to the level of the   mid pelvis. No ureteral stones are identified.    BLADDER: Bladder wall enhancement is again noted.  REPRODUCTIVE ORGANS: Post hysterectomy    BOWEL: Surgical clips and anastomosis noted in the RLQ. No evidence of   bowel obstruction, colitis, or inflammatory process. Normal caliber   appendix.  PERITONEUM/RETROPERITONEUM: No ascites. No free intraperitoneal air.  VESSELS: Aortoiliac calcifications are noted with no evidence of   abdominal aortic aneurysm. 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  LYMPH NODES: No abdominal or pelvic adenopathy.  BONES: Degenerative changes of the spine.  ABDOMINAL WALL: Unremarkable    IMPRESSION:    1. There is moderate right and mild left hydronephrosis and hydroureter   to the level of the mid pelvis. No ureteral stones are identified. A 2-3   hour delayed CT scan, allowing time for contrast to extend through the   ureters, may be helpful to further delineate the site of obstruction. (No   additional contrast is necessary.)    2. Splenomegaly (16 cm in length).    3. Surgical clips and anastomosis noted in the RLQ.    --- End of Report ---            SHER IRENE MD; Attending Interventional Radiologist  This document has been electronically signed. 2025  7:40PM    < end of copied text >

## 2025-07-09 NOTE — CONSULT NOTE ADULT - SUBJECTIVE AND OBJECTIVE BOX
LOMBARDI, FELICE  85y, Female  Allergy: ampicillin (Rash)  levofloxacin (Rash)      CHIEF COMPLAINT:     LOS  1d    HPI:  85-year-old female with PMH cervical cancer (s/p treatment 10 years ago), neurogenic bladder, arthritis, suspected CLL (follows w/ Odaimi)  presents to the ED for evaluation of intermittent diarrhea since January.  Patient states she has up to 5 episodes of loose stools per day, few episodes that wake her up at night, episodes also occur while shes fasting. Diarrhea is non bloodly and consists of soft stool, no mucus. Not associated with bloating or cramping abdominal pain. Patient also reports she has been having frequent UTIs over the past few months, and currently reports dysuria.  Denies fevers, chills, chest pain, shortness of breath, nausea, vomiting, or hematuria    Vitals in the ED were unremarkable  Labs significant for 48k(lymphocyte predominant), Hb 10.7(baseline 11), creat 1.2 (baseline 0.9), UA positive    CTAP : There is moderate right and mild left hydronephrosis and hydroureter   to the level of the mid pelvis. No ureteral stones are identified. A 2-3   hour delayed CT scan, allowing time for contrast to extend through the   ureters, may be helpful to further delineate the site of obstruction. (No   additional contrast is necessary.)    2. Splenomegaly (16 cm in length).   (2025 00:26)      INFECTIOUS DISEASE HISTORY:  History as above.    PAST MEDICAL & SURGICAL HISTORY:  Arthritis      Cervical cancer          FAMILY HISTORY  Family history reviewed and non-contributory      SOCIAL HISTORY  Social History:  Former smoker - 1 ppd for many years (2025 21:46)        ROS  General: Denies rigors, nightsweats  HEENT: Denies headache, rhinorrhea, sore throat, eye pain  CV: Denies CP, palpitations  PULM: Denies wheezing, hemoptysis  GI: Denies hematemesis, hematochezia, melena  : Denies discharge, hematuria  MSK: Denies arthralgias, myalgias  SKIN: Denies rash, lesions  NEURO: Denies paresthesias, weakness  PSYCH: Denies depression, anxiety    VITALS:  T(F): 97.6, Max: 98.3 (25 @ 12:56)  HR: 59  BP: 112/54  RR: 18Vital Signs Last 24 Hrs  T(C): 36.4 (2025 08:03), Max: 36.8 (2025 12:56)  T(F): 97.6 (2025 08:03), Max: 98.3 (2025 12:56)  HR: 59 (2025 08:03) (59 - 70)  BP: 112/54 (2025 08:03) (110/70 - 131/60)  BP(mean): 84 (2025 23:33) (84 - 84)  RR: 18 (2025 08:03) (16 - 18)  SpO2: 94% (2025 08:03) (94% - 100%)    Parameters below as of 2025 08:03  Patient On (Oxygen Delivery Method): room air        PHYSICAL EXAM:  Gen: NAD, resting in bed  HEENT: Normocephalic, atraumatic  Neck: supple, no lymphadenopathy  CV: Regular rate & regular rhythm  Lungs: decreased BS at bases, no fremitus  Abdomen: Soft, BS present  Ext: Warm, well perfused  Neuro: non focal, awake  Skin: no rash, no erythema  Lines: no phlebitis    TESTS & MEASUREMENTS:                        10.6   48.57 )-----------( 130      ( 2025 14:55 )             34.1     07-08    143  |  114[H]  |  31[H]  ----------------------------<  91  4.3   |  18  |  1.2    Ca    9.2      2025 14:55  Mg     1.7     07-08    TPro  5.4[L]  /  Alb  3.6  /  TBili  0.3  /  DBili  x   /  AST  26  /  ALT  17  /  AlkPhos  62  07-08      LIVER FUNCTIONS - ( 2025 14:55 )  Alb: 3.6 g/dL / Pro: 5.4 g/dL / ALK PHOS: 62 U/L / ALT: 17 U/L / AST: 26 U/L / GGT: x           Urinalysis Basic - ( 2025 20:40 )    Color: Red / Appearance: Cloudy / S.028 / pH: x  Gluc: x / Ketone: x  / Bili: Negative / Urobili: 0.2 mg/dL   Blood: x / Protein: 300 mg/dL / Nitrite: Negative   Leuk Esterase: Moderate / RBC: >1900 /HPF /  /HPF   Sq Epi: x / Non Sq Epi: 1 /HPF / Bacteria: Negative /HPF        Urinalysis with Rflx Culture (collected 25 @ 20:40)    Urinalysis with Rflx Culture (collected 25 @ 20:41)    Culture - Urine (collected 25 @ 20:41)  Source: Clean Catch None  Final Report (25 @ 12:51):    >=3 organisms. Probable collection contamination.    Culture - Blood (collected 25 @ 17:15)  Source: .Blood BLOOD  Final Report (25 @ 23:00):    No growth at 5 days    Culture - Blood (collected 25 @ 17:15)  Source: .Blood BLOOD  Final Report (25 @ 23:00):    No growth at 5 days            INFECTIOUS DISEASES TESTING      RADIOLOGY & ADDITIONAL TESTS:  I have personally reviewed the last Chest xray  CXR      CT  CT Abdomen and Pelvis w/ IV Cont:   ACC: [id number] EXAM:  CT ABDOMEN AND PELVIS IC   ORDERED BY: MARGARITA TALBERT     PROCEDURE DATE:  2025          INTERPRETATION:  CLINICAL INFORMATION: Abdominal pain. Diarrhea. WBC   48.57    PMHx of suspected CLL. Post cervical cancer s/p radiation >10   years ago.    COMPARISON: CT scan of the abdomen and pelvis dated 2025    CONTRAST/COMPLICATIONS:  IV Contrast:   The patient received 95 ml of Omnipaque 350 with 5 ml   discarded.  Oral Contrast:  None  Complications:  None reported at time of study completion    PROCEDURE:  CT of the Abdomen and Pelvis was performed.  Sagittal and coronal reformats were performed.    FINDINGS:    TUBES AND LINES: None.  LOWER CHEST: There are mild reticular opacities at the lung bases. No   pleural or pericardial effusion.    LIVER: The liver is normal in size with no evidence of solid mass. The   portal vein is patent.  BILE DUCTS: Normal caliber.  GALLBLADDER: Post cholecystectomy    SPLEEN: Splenomegaly (16 cm in length). Splenic hypodensity too small for   further evaluation.  PANCREAS: The pancreas is mildly atrophic. No evidence of mass or   pancreatitis.  ADRENALS: Within normal limits.  KIDNEYS/URETERS: There is symmetric renal enhancement. There is moderate   right and mild left hydronephrosis and hydroureter to the level of the   mid pelvis. No ureteral stones are identified.    BLADDER: Bladder wall enhancement is again noted.  REPRODUCTIVE ORGANS: Post hysterectomy    BOWEL: Surgical clips and anastomosis noted in the RLQ. No evidence of   bowel obstruction, colitis, or inflammatory process. Normal caliber   appendix.  PERITONEUM/RETROPERITONEUM: No ascites. No free intraperitoneal air.  VESSELS: Aortoiliac calcifications are noted with no evidence of   abdominal aortic aneurysm. 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  LYMPH NODES: No abdominal or pelvic adenopathy.  BONES: Degenerative changes of the spine.  ABDOMINAL WALL: Unremarkable    IMPRESSION:    1. There is moderate right and mild left hydronephrosis and hydroureter   to the level of the mid pelvis. No ureteral stones are identified. A 2-3   hour delayed CT scan, allowing time for contrast to extend through the   ureters, may be helpful to further delineate the site of obstruction. (No   additional contrast is necessary.)    2. Splenomegaly (16 cm in length).    3. Surgical clips and anastomosis noted in the RLQ.    --- End of Report ---            SHER IRENE MD; Attending Interventional Radiologist  This document has been electronically signed. 2025  7:40PM (25 @ 16:46)      CARDIOLOGY TESTING      MEDICATIONS  cefTRIAXone   IVPB 1000 IV Intermittent every 24 hours  heparin   Injectable 5000 SubCutaneous every 8 hours  lactated ringers. 1000 IV Continuous <Continuous>      Weight  Weight (kg): 53.2 (25 @ 22:20)    ANTIBIOTICS:  cefTRIAXone   IVPB 1000 milliGRAM(s) IV Intermittent every 24 hours      ALLERGIES:  ampicillin (Rash)  levofloxacin (Rash)

## 2025-07-09 NOTE — PHYSICAL THERAPY INITIAL EVALUATION ADULT - GENERAL OBSERVATIONS, REHAB EVAL
[id number] pm. 86 y/o F rec'd/left in bed, nad, + tele, primafit, sister present. pt is A+Ox1, required ongoing encouragement to participate in PT evaluation. pt ambulated 2 steps with max assist, limited by overall deconditioning. vitals: 143/67 68 97% on RA.

## 2025-07-09 NOTE — H&P ADULT - NSHPPHYSICALEXAM_GEN_ALL_CORE
GENERAL: No acute distress, well-developed  CHEST/LUNG: Clear to auscultation bilaterally; No wheeze, equal breath sounds bilaterally, respirations nonlabored  HEART: Regular rate and rhythm; No murmurs, rubs, or gallops  ABDOMEN: Soft, nontender, nondistended; Bowel sounds present, no organomegaly  Extremeties: b/l +1 pedal edema  NEUROLOGY: AAOx2, non-focal, moves all extremities spontaneously

## 2025-07-09 NOTE — CONSULT NOTE ADULT - ASSESSMENT
ASSESSMENT  85-year-old female with PMH cervical cancer (s/p treatment 10 years ago), neurogenic bladder, arthritis, suspected CLL (follows w/ Odaimi)  presents to the ED for evaluation of intermittent diarrhea since January.  Patient states she has up to 5 episodes of loose stools per day, few episodes that wake her up at night, episodes also occur while shes fasting. Diarrhea is non bloodly and consists of soft stool, no mucus. Not associated with bloating or cramping abdominal pain. Patient also reports she has been having frequent UTIs over the past few months, and currently reports dysuria.  Denies fevers, chills, chest pain, shortness of breath, nausea, vomiting, or hematuria    IMPRESSION  #Chronic Diarrhea  - GI PCR negative   - C diff testing negative     #CLL  #Cervical Cancer   #Bilateral Hydro Nephrosis   - CT Abdomen and Pelvis w/ IV Cont (07.08.25 @ 16:46): IMPRESSION: 1. There is moderate right and mild left hydronephrosis and hydroureter  to the level of the mid pelvis. No ureteral stones are identified. A 2-3  hour delayed CT scan, allowing time for contrast to extend through the  ureters, may be helpful to further delineate the site of obstruction. (No  additional contrast is necessary.) 2. Splenomegaly (16 cm in length). 3. Surgical clips and anastomosis noted in the RLQ.    #Pyuria     #Neurogenic Bladder    #Abx allergy: ampicillin (Rash)  levofloxacin (Rash)    RECOMMENDATIONS  This is a preliminary incomplete pended note, all final recommendations to follow after interview and examination of the patient.    Please call or message on [employer] Teams if with any questions.  Spectra [phone] ASSESSMENT  85-year-old female with PMH cervical cancer (s/p treatment 10 years ago), neurogenic bladder, arthritis, suspected CLL (follows w/ Odaimi)  presents to the ED for evaluation of intermittent diarrhea since January.  Patient states she has up to 5 episodes of loose stools per day, few episodes that wake her up at night, episodes also occur while shes fasting. Diarrhea is non bloodly and consists of soft stool, no mucus. Not associated with bloating or cramping abdominal pain. Patient also reports she has been having frequent UTIs over the past few months, and currently reports dysuria.  Denies fevers, chills, chest pain, shortness of breath, nausea, vomiting, or hematuria    IMPRESSION  #Chronic Diarrhea  - GI PCR negative   - C diff testing negative     #CLL  #Cervical Cancer   #Bilateral Hydro Nephrosis   - CT Abdomen and Pelvis w/ IV Cont (07.08.25 @ 16:46): IMPRESSION: 1. There is moderate right and mild left hydronephrosis and hydroureter  to the level of the mid pelvis. No ureteral stones are identified. A 2-3  hour delayed CT scan, allowing time for contrast to extend through the  ureters, may be helpful to further delineate the site of obstruction. (No  additional contrast is necessary.) 2. Splenomegaly (16 cm in length). 3. Surgical clips and anastomosis noted in the RLQ.    #Pyuria     #Neurogenic Bladder    #Abx allergy: ampicillin (Rash)  levofloxacin (Rash)    RECOMMENDATIONS  - pyuria/urinary incontinence is non-specific for UTI in setting of hydronephrosis, which is potentially chronic   - ok to continue ceftriaxone 2g daily while waiting for urine Cx   - noted urology evaluation -- possible mag scan to evaluate for obstruction   - consider GI evaluation for chronic diarrhea    Please call or message on [employer] Teams if with any questions.  Spectra [phone]

## 2025-07-09 NOTE — PHYSICAL THERAPY INITIAL EVALUATION ADULT - PERTINENT HX OF CURRENT PROBLEM, REHAB EVAL
85-year-old female with PMH cervical cancer (s/p treatment 10 years ago), neurogenic bladder, arthritis, and suspected CLL (follows w/ Odaimi) who presents to the ED for evaluation of intermittent diarrhea since January.  Patient states she has up to 5 episodes of loose stools per day, few episodes that wake her up at night, episodes also occur while shes fasting. Diarrhea is non bloodly and consists of soft stool, no mucus. Not associated with bloating or cramping abdominal pain. Patient also reports she has been having frequent UTIs over the past few months, and currently reports dysuria.  Denies fevers, chills, chest pain, shortness of breath, nausea, vomiting, or hematuria

## 2025-07-09 NOTE — PHYSICAL THERAPY INITIAL EVALUATION ADULT - CRITERIA FOR SKILLED THERAPEUTIC INTERVENTIONS
CC: PANCREATITIS      INTERVAL HISTORY:    ·	Pain resolved.  ·	Tolerating diet.  ·	BP labile.    ROS: No chest pain. No shortness of breath. No nausea.    PAST MEDICAL & SURGICAL HISTORY:  Glaucoma: Glaucoma  Essential hypertension: HTN (hypertension)  Calculus of kidney: Staghorn calculus  Status post total hysterectomy: History of hysterectomy      PHYSICAL EXAM:  T(C): 36.6, Max: 36.9 ( @ 05:15)  HR: 54  BP: 157/70 (157/70 - 178/81)  RR: 18  SpO2: 93%  Wt(kg): --    I & Os for 24h ending 2017 07:00  =============================================  IN:    lactated ringers: 1800 ml    Oral Fluid: 300 ml    Total IN: 2100 ml  ---------------------------------------------  OUT:    Voided: 1275 ml    Total OUT: 1275 ml  ---------------------------------------------  Total NET: 825 ml    I & Os for current day (as of 2017 12:10)  =============================================  IN:    Total IN: 0 ml  ---------------------------------------------  OUT:    Voided: 300 ml    Total OUT: 300 ml  ---------------------------------------------  Total NET: -300 ml    Weight 59.4 ( @ 21:55)    Appearance: alert, pleasant, INAD.  ENT: oral mucosa moist, no pallor/cyanosis.  Neck: no JVD visible.  Cardiac: no rubs. no murmurs. Extremities: TRACE ANKLE EDEMA.  Skin: no rashes.  Extremities (digits): no clubbing or cyanosis.  Respratory effort: no access muscle use. Lungs: CLEAR TO AUSCULTATION.  Abdomen: soft. nontender. no masses.  Psych affect: not depressed.     MEDICATIONS  (STANDING):  heparin  Injectable 5000Unit(s) SubCutaneous every 8 hours  amLODIPine   Tablet 5milliGRAM(s) Oral daily  pantoprazole  Injectable 40milliGRAM(s) IV Push daily  latanoprost 0.005% Ophthalmic Solution 1Drop(s) Both EYES at bedtime  ATENolol  Tablet 12.5milliGRAM(s) Oral daily  brimonidine 0.2% Ophthalmic Solution 1Drop(s) Both EYES three times a day  insulin lispro (HumaLOG) corrective regimen sliding scale  SubCutaneous every 6 hours  dextrose 5%. 1000milliLiter(s) IV Continuous <Continuous>  dextrose 50% Injectable 12.5Gram(s) IV Push once  dextrose 50% Injectable 25Gram(s) IV Push once  dextrose 50% Injectable 25Gram(s) IV Push once  lactated ringers 1000milliLiter(s) IV Continuous <Continuous>  hydrALAZINE Injectable 10milliGRAM(s) IV Push once    MEDICATIONS  (PRN):  HYDROmorphone  Injectable 0.5milliGRAM(s) IV Push every 4 hours PRN Severe Pain  ondansetron Injectable 4milliGRAM(s) IV Push every 6 hours PRN Nausea  dextrose Gel 1Dose(s) Oral once PRN Blood Glucose LESS THAN 70 milliGRAM(s)/deciliter  glucagon  Injectable 1milliGRAM(s) IntraMuscular once PRN Glucose LESS THAN 70 milligrams/deciliter      DATA:  140    |  105    |  11     ----------------------------<  72     Ca:8.4<L> (2017 06:17)  4.0     |  22     |  0.52       eGFR if Non : 86  eGFR if : 99    TPro  5.8 g/dL<L>  /  Alb  2.5 g/dL<L>  /  TBili  1.0 mg/dL  /  DBili  x      /  AST  37 U/L  /  ALT  36 U/L  /  AlkPhos  253 U/L<H>  2017 06:17                        10.3<L>  6.8   )-----------( 193      ( 2017 06:17 )             33.1<L>    Phos:3.4 mg/dL M.9 mg/dL PTH:-- Uric acid:-- Serum Osm:--  Ferritin:-- Iron:-- TIBC:-- Tsat:--  B12:-- TSH:-- ( @ 06:17)    Urinalysis Basic - ( 2017 01:02 )  Color: Yellow / Appearance: Clear / S.025 / pH: x  Gluc: x / Ketone: NEGATIVE  / Bili: NEGATIVE / Urobili: 0.2 E.U./dL   Blood: x / Protein: NEGATIVE mg/dL / Nitrite: NEGATIVE   Leuk Esterase: NEGATIVE / RBC: x / WBC x   Sq Epi: x / Non Sq Epi: x / Bacteria: x impairments found

## 2025-07-09 NOTE — CONSULT NOTE ADULT - ASSESSMENT
84 y/o f with recurrent cystitis with ascending UTI     A) Right hydronephrosis with seems to occur when her bladder is full.  Mild left hydronephrosis  urinary/fecal incontinence  LUTS  microhematuria  UTI  Splenomegaly  CLL    P) Treat UTI as per ID  consider Ladd catheter to decompress the bladder, if she refuses then  consider Flomax if not contra indicated.  renal/bladder sonogram in 48 hrs  consider Mag 3 renal scan with lasix washout to evaluate possible obstruction  OP f/u for cystoscopy when medically optimal  no acute urologic intervention at this time  will d/w attending

## 2025-07-09 NOTE — H&P ADULT - ATTENDING COMMENTS
HPI as above.  Interval history: Pt seen and examined at bedside. No cp or sob.   Vital Signs (24 Hrs):  T(C): 37.7 (07-09-25 @ 15:31), Max: 37.7 (07-09-25 @ 15:31)  HR: 70 (07-09-25 @ 15:31) (59 - 70)  BP: 128/72 (07-09-25 @ 15:31) (110/70 - 131/60)  RR: 18 (07-09-25 @ 15:31) (18 - 18)  SpO2: 98% (07-09-25 @ 15:31) (94% - 98%)  Wt(kg): --  Daily     Daily     I&O's Summary    PHYSICAL EXAM:  GENERAL: NAD, well-developed  HEAD:  Atraumatic, Normocephalic  EYES: EOMI, PERRLA, conjunctiva and sclera clear  NECK: Supple, No JVD  CHEST/LUNG: Clear to auscultation bilaterally; No wheeze  HEART: Regular rate and rhythm; No murmurs, rubs, or gallops  ABDOMEN: Soft, Nontender, Nondistended; Bowel sounds present  EXTREMITIES:  2+ Peripheral Pulses, No clubbing, cyanosis, or edema  PSYCH: AAOx1-2, confused   NEUROLOGY: non-focal  SKIN: No rashes or lesions  Labs reviewed  Imaging reviewed independently and reviewed read  EKG reviewed independently and reviewed read    Plan  #Chronic Diarrhea(likely secretory)  #Bicarb loss  -HD stable, appears cachectic  -No SIRs  -4-5 episodes/ day of soft watery stools. 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 Start gentle hydration  -Currently making urine. Check bladder scan    #Cystitis  #Hematuria  #Neurogenic bladder  #Chronic b/l hydro  -UA positive, fu UCX  -CTAP: B/l hydro, no stone  -Fu repeat CT for delayed contrast clearance  -Seen by urologist Dr. bonilla in [facility] who reccomended bee  -Pt refusing bee in ED  -s/p aztreonam in ED, pt states she got a rash to ampicillin. Can start CTX in am  -Fu ID cs  - Urology consult    #Anemia  -Hb around baseline   -Check iron, b12 , folate    #CLL   -WBC 48k prev around 40s  -Splenomegaly on CT  -Fish abnormal- 13q deletions  -Follows with dr. Montgomery  -Monitor for now as wbc count has not doubled in 6 months  #Progress Note Handoff  Pending (specify):   follow up urology, labs   Family discussion: house staff updated pt family  Disposition: PT, possible SNF  Decision to admit the pt is based on acuity as above

## 2025-07-10 LAB
ALBUMIN SERPL ELPH-MCNC: 2.9 G/DL — LOW (ref 3.5–5.2)
ALP SERPL-CCNC: 54 U/L — SIGNIFICANT CHANGE UP (ref 30–115)
ALT FLD-CCNC: 13 U/L — SIGNIFICANT CHANGE UP (ref 0–41)
ANION GAP SERPL CALC-SCNC: 11 MMOL/L — SIGNIFICANT CHANGE UP (ref 7–14)
AST SERPL-CCNC: 19 U/L — SIGNIFICANT CHANGE UP (ref 0–41)
BASOPHILS # BLD AUTO: 0.13 K/UL — SIGNIFICANT CHANGE UP (ref 0–0.2)
BASOPHILS NFR BLD AUTO: 0.4 % — SIGNIFICANT CHANGE UP (ref 0–1)
BILIRUB SERPL-MCNC: 0.3 MG/DL — SIGNIFICANT CHANGE UP (ref 0.2–1.2)
BUN SERPL-MCNC: 19 MG/DL — SIGNIFICANT CHANGE UP (ref 10–20)
CALCIUM SERPL-MCNC: 8.3 MG/DL — LOW (ref 8.4–10.5)
CHLORIDE SERPL-SCNC: 109 MMOL/L — SIGNIFICANT CHANGE UP (ref 98–110)
CO2 SERPL-SCNC: 18 MMOL/L — SIGNIFICANT CHANGE UP (ref 17–32)
CREAT SERPL-MCNC: 1.2 MG/DL — SIGNIFICANT CHANGE UP (ref 0.7–1.5)
CULTURE RESULTS: SIGNIFICANT CHANGE UP
EGFR: 45 ML/MIN/1.73M2 — LOW
EGFR: 45 ML/MIN/1.73M2 — LOW
EOSINOPHIL # BLD AUTO: 0.06 K/UL — SIGNIFICANT CHANGE UP (ref 0–0.7)
EOSINOPHIL NFR BLD AUTO: 0.2 % — SIGNIFICANT CHANGE UP (ref 0–8)
GLUCOSE SERPL-MCNC: 91 MG/DL — SIGNIFICANT CHANGE UP (ref 70–99)
HCT VFR BLD CALC: 34.6 % — LOW (ref 37–47)
HGB BLD-MCNC: 10.7 G/DL — LOW (ref 12–16)
IMM GRANULOCYTES NFR BLD AUTO: 0.2 % — SIGNIFICANT CHANGE UP (ref 0.1–0.3)
LYMPHOCYTES # BLD AUTO: 26.83 K/UL — HIGH (ref 1.2–3.4)
LYMPHOCYTES # BLD AUTO: 87.8 % — HIGH (ref 20.5–51.1)
MAGNESIUM SERPL-MCNC: 1.8 MG/DL — SIGNIFICANT CHANGE UP (ref 1.8–2.4)
MCHC RBC-ENTMCNC: 28.4 PG — SIGNIFICANT CHANGE UP (ref 27–31)
MCHC RBC-ENTMCNC: 30.9 G/DL — LOW (ref 32–37)
MCV RBC AUTO: 91.8 FL — SIGNIFICANT CHANGE UP (ref 81–99)
MONOCYTES # BLD AUTO: 0.37 K/UL — SIGNIFICANT CHANGE UP (ref 0.1–0.6)
MONOCYTES NFR BLD AUTO: 1.2 % — LOW (ref 1.7–9.3)
NEUTROPHILS # BLD AUTO: 3.11 K/UL — SIGNIFICANT CHANGE UP (ref 1.4–6.5)
NEUTROPHILS NFR BLD AUTO: 10.2 % — LOW (ref 42.2–75.2)
NRBC BLD AUTO-RTO: 0 /100 WBCS — SIGNIFICANT CHANGE UP (ref 0–0)
PLATELET # BLD AUTO: 102 K/UL — LOW (ref 130–400)
PMV BLD: 11.1 FL — HIGH (ref 7.4–10.4)
POTASSIUM SERPL-MCNC: 3.5 MMOL/L — SIGNIFICANT CHANGE UP (ref 3.5–5)
POTASSIUM SERPL-SCNC: 3.5 MMOL/L — SIGNIFICANT CHANGE UP (ref 3.5–5)
PROT SERPL-MCNC: 4.3 G/DL — LOW (ref 6–8)
RBC # BLD: 3.77 M/UL — LOW (ref 4.2–5.4)
RBC # FLD: 16 % — HIGH (ref 11.5–14.5)
SODIUM SERPL-SCNC: 138 MMOL/L — SIGNIFICANT CHANGE UP (ref 135–146)
SPECIMEN SOURCE: SIGNIFICANT CHANGE UP
WBC # BLD: 30.55 K/UL — HIGH (ref 4.8–10.8)
WBC # FLD AUTO: 30.55 K/UL — HIGH (ref 4.8–10.8)

## 2025-07-10 PROCEDURE — 99232 SBSQ HOSP IP/OBS MODERATE 35: CPT

## 2025-07-10 PROCEDURE — 99223 1ST HOSP IP/OBS HIGH 75: CPT

## 2025-07-10 RX ORDER — SODIUM CHLORIDE 9 G/1000ML
1000 INJECTION, SOLUTION INTRAVENOUS
Refills: 0 | Status: DISCONTINUED | OUTPATIENT
Start: 2025-07-10 | End: 2025-07-11

## 2025-07-10 RX ORDER — VIBEGRON 75 MG/1
1 TABLET, FILM COATED ORAL
Refills: 0 | DISCHARGE

## 2025-07-10 RX ADMIN — HEPARIN SODIUM 5000 UNIT(S): 1000 INJECTION INTRAVENOUS; SUBCUTANEOUS at 06:32

## 2025-07-10 RX ADMIN — TAMSULOSIN HYDROCHLORIDE 0.4 MILLIGRAM(S): 0.4 CAPSULE ORAL at 21:54

## 2025-07-10 RX ADMIN — CEFTRIAXONE 100 MILLIGRAM(S): 500 INJECTION, POWDER, FOR SOLUTION INTRAMUSCULAR; INTRAVENOUS at 21:54

## 2025-07-10 RX ADMIN — HEPARIN SODIUM 5000 UNIT(S): 1000 INJECTION INTRAVENOUS; SUBCUTANEOUS at 21:54

## 2025-07-10 RX ADMIN — HEPARIN SODIUM 5000 UNIT(S): 1000 INJECTION INTRAVENOUS; SUBCUTANEOUS at 13:22

## 2025-07-10 RX ADMIN — SODIUM CHLORIDE 75 MILLILITER(S): 9 INJECTION, SOLUTION INTRAVENOUS at 13:22

## 2025-07-10 RX ADMIN — Medication 650 MILLIGRAM(S): at 06:32

## 2025-07-10 RX ADMIN — Medication 650 MILLIGRAM(S): at 17:08

## 2025-07-10 NOTE — PROGRESS NOTE ADULT - ASSESSMENT
85-year-old female with PMH cervical cancer (s/p treatment 10 years ago), neurogenic bladder, arthritis, suspected CLL (follows w/ Valentina)  presents to the ED for evaluation of intermittent diarrhea and dysuria      #Chronic Diarrhea  -HD stable, appears cachectic  -No SIRS  -4-5 episodes/ day of soft watery stools. No association with lactose intake  - CTAP w/ iv contrast: 1. There is moderate right and mild left hydronephrosis and hydroureter   to the level of the mid pelvis. No ureteral stones are identified. A 2-3   hour delayed CT scan, allowing time for contrast to extend through the   ureters, may be helpful to further delineate the site of obstruction. (No   additional contrast is necessary.)  2. Splenomegaly (16 cm in length).  3. Surgical clips and anastomosis noted in the RLQ.  -No h/o NSAID or PPI use  -Cdiff neg  -Fu GI PCR, stool cx, Bcx, ova parasites, stool osm (secretory vs osmotic diarrhea), calprotectin, fat  -ESR CRP vit ADEK level  -imodium prn  - c/w IVF LR @ 75ml/hr  - bicarb 17. started on po bicarb 650mg bid. 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 Start gentle hydration  -Currently making urine. Check bladder scan q6h    #Dysuria  #pyuria  #Neurogenic bladder  #Chronic b/l hydro  -UA positive for WBC and LE   - UCX neg  - BCX NGTD  -CTAP: B/l hydro, no stone  -Fu repeat CT for delayed contrast clearance  -Seen by urologist Dr. bonilla in [facility] who reccomended bee  -Pt refusing bee in ED  -s/p aztreonam in ED, pt states she got a rash to ampicillin.  -ID recs noted  - will do rocephin for 3 days   - urology recs noted: started flomax, repeat KBUS in 48 hr    #Anemia  -Hb around baseline   -Check iron, b12 , folate    #suspected CLL   -WBC 48k prev around 40s  -Splenomegaly on CT  -Fish abnormal- 13q deletions  -Follows with dr. Montgomery OP   - not on any treatments per HIE       #MISC  - DVT ppx: heparin sq  - Diet: regular  - Activity: IAT  - CODE: FULL  - Dispo: [facility]

## 2025-07-10 NOTE — CONSULT NOTE ADULT - ASSESSMENT
Ms Lombardi is an 85-year-old female with PMH of cervical cancer (s/p treatment 10 years ago), neurogenic bladder, arthritis, suspected CLL (follows w/ Dr Montgomery) who presented to the ED for evaluation of intermittent diarrhea since January. GI is consulted for chronic diarrhea and evaluation colonoscopy to rule out collagenous colitis.      Ms Lombardi is an 85-year-old female with PMH of cervical cancer (s/p treatment 10 years ago), neurogenic bladder, arthritis, suspected CLL (follows w/ Dr Montgomery) who presented to the ED for evaluation of intermittent diarrhea since January. GI is consulted for chronic diarrhea and evaluation colonoscopy to rule out collagenous colitis.     #Chronic diarrhea Ms Lombardi is an 85-year-old female with PMH of cervical cancer (s/p hysterectomy 10 years ago), neurogenic bladder, arthritis, suspected CLL (follows w/ Dr Montgomery) who presented to the ED for evaluation of intermittent diarrhea since January. GI is consulted for chronic diarrhea and evaluation colonoscopy to rule out collagenous colitis.     #Chronic diarrhea  #Fecal incontinence  #Tenesmus  #GERD  #Hx of cervical cancer  #Neurogenic bladder with recurrent UTIs  #Suspicion of CLL  - Differentials for patient's chronic diarrhea are broad, including postcholecystectomy bile acid diarrhea, malabsorption disorder, thyroid problems, chronic pancreatic insufficiency, fecal incontinence due to pelvic floor problems,   - Patient reports diarrhea for 20 years. On average, she had 4 BMs per day. Her stools are soft and pudding like with some days of liquid watery stool. It has been happening continuous without any episode of constipation or normal bowel movement.  - Reports streaks of blood on the stool likely because she has history of hemorrhoids.   - Reports tenesmus, fecal incontinence, and nocturnal diarrhea.   - Reports weight loss of around 158 lbs in 8 years.  - No family history of celiac disease, IBD, or colorectal cancer.   - EGD 2 years ago per patient for heart burn which was unremarkable.   - Leukocytosis improving 30.5k (lymphocyte predominant), Hb 10.7 (baseline 11), creat 1.2 (baseline 0.9), UA positive.   - CTAP showed surgical clips and anastomosis in RLQ moderate right and mild left hydronephrosis and hydroureter to the level of the mid pelvis, splenomegaly up to 16 cm in length.   - C diff is negative. RVP negative. GI PCR, ova and parasites, stool culture and stool osmolarity are pending.  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     RECOMMENDATIONS:  - Recommend sending TSH, fecal elastase, fecal leukocytes, fecal calprotectin, fecal fat test, Anti-TTG IgA.   - Will follow on GI PCR, ova and parasites, stool culture, and stool osmolarity.   -     Ms Lombardi is an 85-year-old female with PMH of cervical cancer (s/p hysterectomy 10 years ago), neurogenic bladder, arthritis, suspected CLL (follows w/ Dr Montgomery) who presented to the ED for evaluation of intermittent diarrhea since January. GI is consulted for chronic diarrhea and evaluation colonoscopy to rule out collagenous colitis.     #Chronic diarrhea  #Fecal incontinence  #Tenesmus  #History of cholecystectomy  #History of bowel anastomosis   #Mild Pancreatic atrophy on CT abdomen  - Differentials for patient's chronic diarrhea include infectious diarrhea, postcholecystectomy bile acid diarrhea, malabsorption disorder, thyroid problems, chronic pancreatic insufficiency. Fecal incontinence could be secondary to pelvic floor problems s/p hysterectomy.  - Patient reports diarrhea for 20 years. On average, she had 4 BMs per day. Her stools are soft and pudding like with some days of liquid watery stool. It has been happening continuous without any episode of constipation or normal bowel movement.  - Reports streaks of blood on the stool likely because she has history of hemorrhoids.   - Reports tenesmus, fecal incontinence, and nocturnal diarrhea.   - Reports weight loss of around 158 lbs in 8 years.  - No family history of celiac disease, IBD, or colorectal cancer.   - No hx of colonoscopy per patient.   - EGD 2 years ago per patient for heart burn which was unremarkable.   - Leukocytosis improving 30.5k (lymphocyte predominant), Hb 10.7 (baseline 11), creat 1.2 (baseline 0.9), UA positive.   - CTAP showed surgical clips and anastomosis in RLQ moderate right and mild left hydronephrosis and hydroureter to the level of the mid pelvis, splenomegaly up to 16 cm in length.   - C diff is negative. RVP negative. GI PCR, ova and parasites, stool culture and stool osmolarity are pending.  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     RECOMMENDATIONS:  - Recommend sending TSH, fecal elastase, fecal leukocytes, fecal calprotectin, fecal fat test, and Anti-TTG IgA.   - Will follow on GI PCR, ova and parasites, stool culture, and stool osmolarity.   - Once infectious etiologies are ruled out, can start patient on imodium.  - Patient would also benefit from cholestyramine as she can likely have bile acid diarrhea after cholecystectomy.  - Recommend outpatient colonoscopy and EGD for chronic diarrhea.          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 GI is consulted for chronic diarrhea and evaluation colonoscopy to rule out collagenous colitis.     #Chronic diarrhea  #Fecal incontinence  #Tenesmus  #History of cholecystectomy  #Surgical clips and bowel anastomosis in RLQ on CT abdomen   #Mild Pancreatic atrophy on CT abdomen  - Differentials for patient's chronic diarrhea include infectious diarrhea, postcholecystectomy bile acid diarrhea, malabsorption disorder, thyroid problems, chronic pancreatic insufficiency. Fecal incontinence could be secondary to pelvic floor problems s/p hysterectomy.  - Patient reports diarrhea for 20 years. On average, she had 4 BMs per day. Her stools are soft and pudding like with some days of liquid watery stool. It has been happening continuous without any episode of constipation or normal bowel movement.  - Reports not receiving chemotherapy or radiation therapy for cervical cancer.   - Reports streaks of blood on the stool likely because she has history of hemorrhoids.   - Reports tenesmus, fecal incontinence, and nocturnal diarrhea.   - Reports weight loss of around 158 lbs in 8 years.  - No family history of celiac disease, IBD, or colorectal cancer.   - No hx of colonoscopy per patient.   - EGD 2 years ago per patient for heart burn which was unremarkable.   - Leukocytosis improving 30.5k (lymphocyte predominant), Hb 10.7 (baseline 11), creat 1.2 (baseline 0.9), UA positive.   - CTAP showed surgical clips and anastomosis in RLQ, moderate right and mild left hydronephrosis and hydroureter to the level of the mid pelvis, splenomegaly up to 16 cm in length, mild pancreatic atrophy.   - C diff is negative. RVP negative.     RECOMMENDATIONS:  - Recommend sending TSH, fecal elastase, fecal leukocytes, fecal calprotectin, fecal fat test, and Anti-TTG IgA.   - Will follow on GI PCR, ova and parasites, stool culture, and stool osmolarity.   - Once infectious etiologies are ruled out, can start patient on imodium.  - Patient would also benefit from cholestyramine as she can likely have bile acid diarrhea after cholecystectomy.  - Recommend outpatient colonoscopy and EGD for chronic diarrhea once infectious etiologies rule out.     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 Patient follows with Dr. Montgomery.   - No hx of colonoscopy per patient.   - Reports weight loss of around 158 lbs in 8 years.  - No family history of celiac disease, IBD, or colorectal cancer.     RECOMMENDATIONS:  - Recommend outpatient colonoscopy and EGD for chronic diarrhea once infectious etiologies rule out.       Plan of care discussed with the attending physician, Dr Jolanta MD.     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 GI is consulted for chronic diarrhea and evaluation colonoscopy to rule out collagenous colitis.     #Chronic diarrhea  #Fecal incontinence  #Tenesmus  #History of cholecystectomy  #Surgical clips and bowel anastomosis in RLQ on CT abdomen   #Mild Pancreatic atrophy on CT abdomen  - Differentials for patient's chronic diarrhea include infectious diarrhea, postcholecystectomy bile acid diarrhea, malabsorption disorder, thyroid problems, chronic pancreatic insufficiency. Fecal incontinence could be secondary to pelvic floor problems s/p hysterectomy.  - Patient reports diarrhea for 20 years. On average, she had 4 BMs per day. Her stools are soft and pudding like with some days of liquid watery stool. It has been happening continuous without any episode of constipation or normal bowel movement.  - Reports not receiving chemotherapy or radiation therapy for cervical cancer.   - Reports streaks of blood on the stool likely because she has history of hemorrhoids.   - Reports tenesmus, fecal incontinence, and nocturnal diarrhea.   - Reports weight loss of around 158 lbs in 8 years.  - No family history of celiac disease, IBD, or colorectal cancer.   - No hx of colonoscopy per patient.   - EGD 2 years ago per patient for heart burn which was unremarkable.   - Leukocytosis improving 30.5k (lymphocyte predominant), Hb 10.7 (baseline 11), creat 1.2 (baseline 0.9), UA positive.   - CTAP showed surgical clips and anastomosis in RLQ, moderate right and mild left hydronephrosis and hydroureter to the level of the mid pelvis, splenomegaly up to 16 cm in length, mild pancreatic atrophy.   - C diff is negative. RVP negative.     RECOMMENDATIONS:  - Recommend sending TSH, fecal elastase, fecal leukocytes, fecal calprotectin, fecal fat test, and Anti-TTG IgA.   - Will follow on GI PCR, ova and parasites, stool culture, and stool osmolarity.   - Once infectious etiologies are ruled out, can start patient on imodium.  - Patient would also benefit from cholestyramine as she can likely have bile acid diarrhea after cholecystectomy.  - Recommend outpatient colonoscopy and EGD for chronic diarrhea once infectious etiologies rule out.     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 Patient follows with Dr. Montgomery.   - No hx of colonoscopy per patient.   - Reports weight loss of around 158 lbs in 8 years.  - No family history of celiac disease, IBD, or colorectal cancer.     RECOMMENDATIONS:  - Recommend outpatient colonoscopy and EGD for chronic diarrhea once infectious etiologies rule out.   - Send iron panel       Plan of care discussed with the attending physician, Dr Jolanta MD.     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 GI is consulted for chronic diarrhea and evaluation colonoscopy to rule out collagenous colitis.     #Chronic diarrhea  #Fecal incontinence  #Tenesmus  #History of cholecystectomy  #Surgical clips and bowel anastomosis in RLQ on CT abdomen   #Mild Pancreatic atrophy on CT abdomen  - Differentials for patient's chronic diarrhea include infectious diarrhea, postcholecystectomy bile acid diarrhea, malabsorption disorder, hyperthyroidism, chronic pancreatic insufficiency. Fecal incontinence could be secondary to pelvic floor problems s/p hysterectomy.  - Reports not receiving chemotherapy or radiation therapy for cervical cancer.   - No family history of celiac disease, IBD, or colorectal cancer.   - No hx of colonoscopy per patient.   - EGD 2 years ago per patient for heart burn which was unremarkable.   - Leukocytosis improving 30.5k (lymphocyte predominant), Hb 10.7 (baseline 11), creat 1.2 (baseline 0.9), UA positive.   - CTAP showed surgical clips and anastomosis in RLQ, moderate right and mild left hydronephrosis and hydroureter to the level of the mid pelvis, splenomegaly up to 16 cm in length, mild pancreatic atrophy.   - C diff is negative. RVP negative.     RECOMMENDATIONS:  - Recommend sending TSH, fecal elastase, fecal leukocytes, fecal calprotectin, fecal fat test, and Anti-TTG IgA.   - Will follow on GI PCR, ova and parasites, stool culture, and stool osmolarity.   - Once infectious etiologies are ruled out, can start patient on imodium.  - Patient would also benefit from cholestyramine as she can likely have bile acid diarrhea after cholecystectomy.  - Recommend outpatient colonoscopy and EGD for chronic diarrhea once infectious etiologies rule out.   - Follow-up with our [facility] [phone] at [address] [place] [address]     #Anemia  #Thrombocytopenia  #Leukocytosis  #Splenomegaly  #Suspicion of CLL  - Patient also had a FISH test for CLL which showed 13q deletion. Patient follows with Dr. Montgomery.   - No hx of colonoscopy per patient.   - Reports weight loss of around 158 lbs in 8 years.  - No family history of celiac disease, IBD, or colorectal cancer.     RECOMMENDATIONS:  - Recommend outpatient colonoscopy and EGD for chronic diarrhea once infectious etiologies rule out.   - Send iron panel

## 2025-07-10 NOTE — PATIENT PROFILE ADULT - FALL HARM RISK - RISK INTERVENTIONS

## 2025-07-10 NOTE — CONSULT NOTE ADULT - SUBJECTIVE AND OBJECTIVE BOX
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  Patient stated that she has up to 5 episodes of loose stools per day, few episodes that wake her up at night, episodes also occur while she is fasting. Diarrhea is non bloody and consists of soft stool with no mucus. It is not associated with bloating or cramping abdominal pain. Patient also reports she has been having frequent UTIs over the past few months, and currently reports dysuria. Denies fevers, chills, chest pain, shortness of breath, nausea, vomiting, or hematuria.    GI is consulted for chronic diarrhea and evaluation colonoscopy to rule out collagenous colitis. In the ED, patient was HD stable. Labs were significant for 48k (lymphocyte predominant), Hb 10.7 (baseline 11), creat 1.2 (baseline 0.9), UA positive. CTAP showed moderate right and mild left hydronephrosis and hydroureter to the level of the mid pelvis, splenomegaly up to 16 cm in length.       PAST MEDICAL & SURGICAL HISTORY:  Arthritis  Cervical cancer  No significant past surgical history    REVIEW OF SYSTEMS:   Negative except for HPI    MEDICATIONS:   MEDICATIONS  (STANDING):  cefTRIAXone   IVPB 1000 milliGRAM(s) IV Intermittent every 24 hours  heparin   Injectable 5000 Unit(s) SubCutaneous every 8 hours  lactated ringers. 1000 milliLiter(s) (75 mL/Hr) IV Continuous <Continuous>  sodium bicarbonate 650 milliGRAM(s) Oral two times a day  tamsulosin 0.4 milliGRAM(s) Oral at bedtime    MEDICATIONS  (PRN):  haloperidol    Injectable 5 milliGRAM(s) IntraMuscular once PRN agitation  loperamide 2 milliGRAM(s) Oral two times a day PRN Diarrhea    DIET:  Diet, Regular (07-09-25 @ 01:02) [Active]    ALLERGIES:   Allergies    ampicillin (Rash)  levofloxacin (Rash)    Intolerances    Substance Use:   (  ) never used  (  ) other:  Tobacco Usage:  (   ) never smoked   (   ) former smoker   (   ) current smoker  (     ) pack year  (        ) last cigarette date  Alcohol Usage:    Family History   IBD (  ) Yes   (  ) No  GI Malignancy (  )  Yes    (  ) No    Health Management  Last Colonoscopy:  Last Endoscopy:     VITAL SIGNS:   Vital Signs Last 24 Hrs  T(C): 36.6 (10 Jul 2025 07:00), Max: 37.7 (09 Jul 2025 15:31)  T(F): 97.8 (10 Jul 2025 07:00), Max: 99.8 (09 Jul 2025 15:31)  HR: 65 (10 Jul 2025 07:00) (63 - 70)  BP: 112/68 (10 Jul 2025 07:00) (106/61 - 128/72)  BP(mean): 90 (09 Jul 2025 15:31) (90 - 90)  RR: 17 (10 Jul 2025 07:00) (17 - 18)  SpO2: 96% (10 Jul 2025 07:00) (96% - 98%)    Parameters below as of 10 Jul 2025 07:00  Patient On (Oxygen Delivery Method): room air    I&O's Summary    PHYSICAL EXAM:   GENERAL:  No acute distress  HEENT:  NC/AT, conjunctiva clear, sclera anicteric  CHEST:  No increased effort  HEART:  Regular rate  ABDOMEN:  Soft, non-tender, non-distended, no rebound or guarding  EXTREMITIES: No edema  SKIN:  Warm, dry  NEURO:  Calm, cooperative    LABS:                        10.7   30.55 )-----------( 102      ( 10 Jul 2025 05:48 )             34.6     Hemoglobin: 10.7 g/dL (07-10-25 @ 05:48)  Hemoglobin: 11.4 g/dL (07-09-25 @ 18:19)  Hemoglobin: 10.6 g/dL (07-08-25 @ 14:55)    07-10    138  |  109  |  19  ----------------------------<  91  3.5   |  18  |  1.2    Ca    8.3[L]      10 Jul 2025 05:48  Mg     1.8     07-10    TPro  4.3[L]  /  Alb  2.9[L]  /  TBili  0.3  /  DBili  x   /  AST  19  /  ALT  13  /  AlkPhos  54  07-10    LIVER FUNCTIONS - ( 10 Jul 2025 05:48 )  Alb: 2.9 g/dL / Pro: 4.3 g/dL / ALK PHOS: 54 U/L / ALT: 13 U/L / AST: 19 U/L / GGT: x                 Culture - Blood (collected 08 Jul 2025 22:30)  Source: Blood Blood  Preliminary Report (10 Jul 2025 02:03):    No growth at 24 hours    Culture - Blood (collected 08 Jul 2025 22:30)  Source: Blood Blood  Preliminary Report (10 Jul 2025 02:03):    No growth at 24 hours    Urinalysis with Rflx Culture (collected 08 Jul 2025 20:40)    Culture - Urine (collected 08 Jul 2025 20:40)  Source: Clean Catch None  Final Report (09 Jul 2025 22:15):    <10,000 CFU/mL Normal Urogenital Nilda    Culture - Stool (collected 08 Jul 2025 17:25)  Source: Stool Feces  Preliminary Report (09 Jul 2025 22:21):    No enteric pathogens to date: Final culture pending    Clostridium difficile GDH Interpretation: Negative for toxigenic C. Difficile.  This specimen is negative for C.  Difficile glutamate dehydrogenase (GDH) antigen and negative for C.  Difficile Toxins A & B, by EIA.  GDH is a highly sensitive screening  marker for C. Difficile that is produced in large amounts by all C.  Difficile strains, both toxigenic and nontoxigenic.  This assay has not  been validated as a test of cure.  Repeat testing during the same episode  of diarrhea is of limited value and is discouraged.  The results of this  assay should always be interpreted in conjunction with patient's clinical  history. (07-08-25 @ 17:22)      RADIOLOGY & ADDITIONAL STUDIES:      ACC: [id number] EXAM:  CT ABDOMEN AND PELVIS IC   ORDERED BY: MARGARITA TALBERT     PROCEDURE DATE:  07/08/2025      INTERPRETATION:  CLINICAL INFORMATION: Abdominal pain. Diarrhea. WBC   48.57    PMHx of suspected CLL. Post cervical cancer s/p radiation >10   years ago.    COMPARISON: CT scan of the abdomen and pelvis dated 1/16/2025    CONTRAST/COMPLICATIONS:  IV Contrast:   The patient received 95 ml of Omnipaque 350 with 5 ml   discarded.  Oral Contrast:  None  Complications:  None reported at time of study completion    PROCEDURE:  CT of the Abdomen and Pelvis was performed.  Sagittal and coronal reformats were performed.    FINDINGS:    TUBES AND LINES: None.  LOWER CHEST: There are mild reticular opacities at the lung bases. No   pleural or pericardial effusion.    LIVER: The liver is normal in size with no evidence of solid mass. The   portal vein is patent.  BILE DUCTS: Normal caliber.  GALLBLADDER: Post cholecystectomy    SPLEEN: Splenomegaly (16 cm in length). Splenic hypodensity too small for   further evaluation.  PANCREAS: The pancreas is mildly atrophic. No evidence of mass or   pancreatitis.  ADRENALS: Within normal limits.  KIDNEYS/URETERS: There is symmetric renal enhancement. There is moderate   right and mild left hydronephrosis and hydroureter to the level of the   mid pelvis. No ureteral stones are identified.    BLADDER: Bladder wall enhancement is again noted.  REPRODUCTIVE ORGANS: Post hysterectomy    BOWEL: Surgical clips and anastomosis noted in the RLQ. No evidence of   bowel obstruction, colitis, or inflammatory process. Normal caliber   appendix.  PERITONEUM/RETROPERITONEUM: No ascites. No free intraperitoneal air.  VESSELS: Aortoiliac calcifications are noted with no evidence of   abdominal aortic aneurysm. 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  LYMPH NODES: No abdominal or pelvic adenopathy.  BONES: Degenerative changes of the spine.  ABDOMINAL WALL: Unremarkable    IMPRESSION:    1. There is moderate right and mild left hydronephrosis and hydroureter   to the level of the mid pelvis. No ureteral stones are identified. A 2-3   hour delayed CT scan, allowing time for contrast to extend through the   ureters, may be helpful to further delineate the site of obstruction. (No   additional contrast is necessary.)    2. Splenomegaly (16 cm in length).    3. Surgical clips and anastomosis noted in the RLQ.    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  Patient stated that she has up to 5 episodes of loose stools per day, few episodes that wake her up at night, episodes also occur while she is fasting. Diarrhea is non bloody and consists of soft stool with no mucus. It is not associated with bloating or cramping abdominal pain. Patient also reports she has been having frequent UTIs over the past few months, and currently reports dysuria. Denies fevers, chills, chest pain, shortness of breath, nausea, vomiting, or hematuria.    GI is consulted for chronic diarrhea and evaluation colonoscopy to rule out collagenous colitis. In the ED, patient was HD stable. Labs were significant for 48k (lymphocyte predominant), Hb 10.7 (baseline 11), creat 1.2 (baseline 0.9), UA positive. CTAP showed surgical clips and anastomosis in RLQ moderate right and mild left hydronephrosis and hydroureter to the level of the mid pelvis, splenomegaly up to 16 cm in length. WBC are improving (30.5K). D diff is negative. RVP negative. GI PCR, ova and parasites, stool culture and stool osmolarity are pending. 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       PAST MEDICAL & SURGICAL HISTORY:  Arthritis  Cervical cancer  No significant past surgical history    REVIEW OF SYSTEMS:   Negative except for HPI    MEDICATIONS:   MEDICATIONS  (STANDING):  cefTRIAXone   IVPB 1000 milliGRAM(s) IV Intermittent every 24 hours  heparin   Injectable 5000 Unit(s) SubCutaneous every 8 hours  lactated ringers. 1000 milliLiter(s) (75 mL/Hr) IV Continuous <Continuous>  sodium bicarbonate 650 milliGRAM(s) Oral two times a day  tamsulosin 0.4 milliGRAM(s) Oral at bedtime    MEDICATIONS  (PRN):  haloperidol    Injectable 5 milliGRAM(s) IntraMuscular once PRN agitation  loperamide 2 milliGRAM(s) Oral two times a day PRN Diarrhea    DIET:  Diet, Regular (07-09-25 @ 01:02) [Active]    ALLERGIES:   Allergies    ampicillin (Rash)  levofloxacin (Rash)    Intolerances    Substance Use:   (  ) never used  (  ) other:  Tobacco Usage:  (   ) never smoked   (   ) former smoker   (   ) current smoker  (     ) pack year  (        ) last cigarette date  Alcohol Usage:    Family History   IBD (  ) Yes   (  ) No  GI Malignancy (  )  Yes    (  ) No    Health Management  Last Colonoscopy:  Last Endoscopy:     VITAL SIGNS:   Vital Signs Last 24 Hrs  T(C): 36.6 (10 Jul 2025 07:00), Max: 37.7 (09 Jul 2025 15:31)  T(F): 97.8 (10 Jul 2025 07:00), Max: 99.8 (09 Jul 2025 15:31)  HR: 65 (10 Jul 2025 07:00) (63 - 70)  BP: 112/68 (10 Jul 2025 07:00) (106/61 - 128/72)  BP(mean): 90 (09 Jul 2025 15:31) (90 - 90)  RR: 17 (10 Jul 2025 07:00) (17 - 18)  SpO2: 96% (10 Jul 2025 07:00) (96% - 98%)    Parameters below as of 10 Jul 2025 07:00  Patient On (Oxygen Delivery Method): room air    I&O's Summary    PHYSICAL EXAM:   GENERAL:  No acute distress  HEENT:  NC/AT, conjunctiva clear, sclera anicteric  CHEST:  No increased effort  HEART:  Regular rate  ABDOMEN:  Soft, non-tender, non-distended, no rebound or guarding  EXTREMITIES: No edema  SKIN:  Warm, dry  NEURO:  Calm, cooperative    LABS:                        10.7   30.55 )-----------( 102      ( 10 Jul 2025 05:48 )             34.6     Hemoglobin: 10.7 g/dL (07-10-25 @ 05:48)  Hemoglobin: 11.4 g/dL (07-09-25 @ 18:19)  Hemoglobin: 10.6 g/dL (07-08-25 @ 14:55)    07-10    138  |  109  |  19  ----------------------------<  91  3.5   |  18  |  1.2    Ca    8.3[L]      10 Jul 2025 05:48  Mg     1.8     07-10    TPro  4.3[L]  /  Alb  2.9[L]  /  TBili  0.3  /  DBili  x   /  AST  19  /  ALT  13  /  AlkPhos  54  07-10    LIVER FUNCTIONS - ( 10 Jul 2025 05:48 )  Alb: 2.9 g/dL / Pro: 4.3 g/dL / ALK PHOS: 54 U/L / ALT: 13 U/L / AST: 19 U/L / GGT: x                 Culture - Blood (collected 08 Jul 2025 22:30)  Source: Blood Blood  Preliminary Report (10 Jul 2025 02:03):    No growth at 24 hours    Culture - Blood (collected 08 Jul 2025 22:30)  Source: Blood Blood  Preliminary Report (10 Jul 2025 02:03):    No growth at 24 hours    Urinalysis with Rflx Culture (collected 08 Jul 2025 20:40)    Culture - Urine (collected 08 Jul 2025 20:40)  Source: Clean Catch None  Final Report (09 Jul 2025 22:15):    <10,000 CFU/mL Normal Urogenital Nilda    Culture - Stool (collected 08 Jul 2025 17:25)  Source: Stool Feces  Preliminary Report (09 Jul 2025 22:21):    No enteric pathogens to date: Final culture pending    Clostridium difficile GDH Interpretation: Negative for toxigenic C. Difficile.  This specimen is negative for C.  Difficile glutamate dehydrogenase (GDH) antigen and negative for C.  Difficile Toxins A & B, by EIA.  GDH is a highly sensitive screening  marker for C. Difficile that is produced in large amounts by all C.  Difficile strains, both toxigenic and nontoxigenic.  This assay has not  been validated as a test of cure.  Repeat testing during the same episode  of diarrhea is of limited value and is discouraged.  The results of this  assay should always be interpreted in conjunction with patient's clinical  history. (07-08-25 @ 17:22)      RADIOLOGY & ADDITIONAL STUDIES:      ACC: [id number] EXAM:  CT ABDOMEN AND PELVIS IC   ORDERED BY: MARGARITA TALBERT     PROCEDURE DATE:  07/08/2025      INTERPRETATION:  CLINICAL INFORMATION: Abdominal pain. Diarrhea. WBC   48.57    PMHx of suspected CLL. Post cervical cancer s/p radiation >10   years ago.    COMPARISON: CT scan of the abdomen and pelvis dated 1/16/2025    CONTRAST/COMPLICATIONS:  IV Contrast:   The patient received 95 ml of Omnipaque 350 with 5 ml   discarded.  Oral Contrast:  None  Complications:  None reported at time of study completion    PROCEDURE:  CT of the Abdomen and Pelvis was performed.  Sagittal and coronal reformats were performed.    FINDINGS:    TUBES AND LINES: None.  LOWER CHEST: There are mild reticular opacities at the lung bases. No   pleural or pericardial effusion.    LIVER: The liver is normal in size with no evidence of solid mass. The   portal vein is patent.  BILE DUCTS: Normal caliber.  GALLBLADDER: Post cholecystectomy    SPLEEN: Splenomegaly (16 cm in length). Splenic hypodensity too small for   further evaluation.  PANCREAS: The pancreas is mildly atrophic. No evidence of mass or   pancreatitis.  ADRENALS: Within normal limits.  KIDNEYS/URETERS: There is symmetric renal enhancement. There is moderate   right and mild left hydronephrosis and hydroureter to the level of the   mid pelvis. No ureteral stones are identified.    BLADDER: Bladder wall enhancement is again noted.  REPRODUCTIVE ORGANS: Post hysterectomy    BOWEL: Surgical clips and anastomosis noted in the RLQ. No evidence of   bowel obstruction, colitis, or inflammatory process. Normal caliber   appendix.  PERITONEUM/RETROPERITONEUM: No ascites. No free intraperitoneal air.  VESSELS: Aortoiliac calcifications are noted with no evidence of   abdominal aortic aneurysm. 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  LYMPH NODES: No abdominal or pelvic adenopathy.  BONES: Degenerative changes of the spine.  ABDOMINAL WALL: Unremarkable    IMPRESSION:    1. There is moderate right and mild left hydronephrosis and hydroureter   to the level of the mid pelvis. No ureteral stones are identified. A 2-3   hour delayed CT scan, allowing time for contrast to extend through the   ureters, may be helpful to further delineate the site of obstruction. (No   additional contrast is necessary.)    2. Splenomegaly (16 cm in length).    3. Surgical clips and anastomosis noted in the RLQ.    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  Patient stated that she has up to 5 episodes of loose stools per day, few episodes that wake her up at night, episodes also occur while she is fasting. Diarrhea is non bloody and consists of soft stool with no mucus. It is not associated with bloating or cramping abdominal pain. Patient also reports she has been having frequent UTIs over the past few months, and currently reports dysuria. Denies fevers, chills, chest pain, shortness of breath, nausea, vomiting, or hematuria.    GI is consulted for chronic diarrhea and evaluation colonoscopy to rule out collagenous colitis. Patient reported that she had diarrhea since 20 years. On average, she had 4 BMs per day. Her stools are soft and pudding like with some days of liquid watery stool. It has been happening continuous without any episode of constipation or normal bowel movement. She does not report any foul smelling diarrhea. Her stool mostly floats on the bowl. She also has sen streaks of blood on the stool likely because she has history of hemorrhoids. She had an EGD 2 years ago for heart burn which was unremarkable according to her. She still complains of heart burn. She also reports tenesemus and fecal incontinence and is not able to make it to rest room sometimes. She also complains of nocturnal diarrhea which wakes her up at night. She reports feeling always hungry. She followed with GI years ago but nobody was able to figure out the reason of her problems so she stopped following. No records on the chart. She lost around 158 lbs in 8 years. She does not complain of fever but has chills. Her symptoms get worse with spicy food and have stopped eating chillies. She does not have any family history of celiac disease or IBD. Her sister also had some genital cancer but is not sure what type it was.      In the ED, patient was HD stable. Labs were significant for 48k (lymphocyte predominant), Hb 10.7 (baseline 11), creat 1.2 (baseline 0.9), UA positive. CTAP showed surgical clips and anastomosis in RLQ moderate right and mild left hydronephrosis and hydroureter to the level of the mid pelvis, splenomegaly up to 16 cm in length. WBC are improving (30.5K). C diff is negative. RVP negative. GI PCR, ova and parasites, stool culture and stool osmolarity are pending. 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       PAST MEDICAL & SURGICAL HISTORY:  Arthritis  Cervical cancer  No significant past surgical history    REVIEW OF SYSTEMS:   Negative except for HPI    MEDICATIONS:   MEDICATIONS  (STANDING):  cefTRIAXone   IVPB 1000 milliGRAM(s) IV Intermittent every 24 hours  heparin   Injectable 5000 Unit(s) SubCutaneous every 8 hours  lactated ringers. 1000 milliLiter(s) (75 mL/Hr) IV Continuous <Continuous>  sodium bicarbonate 650 milliGRAM(s) Oral two times a day  tamsulosin 0.4 milliGRAM(s) Oral at bedtime    MEDICATIONS  (PRN):  haloperidol    Injectable 5 milliGRAM(s) IntraMuscular once PRN agitation  loperamide 2 milliGRAM(s) Oral two times a day PRN Diarrhea    DIET:  Diet, Regular (07-09-25 @ 01:02) [Active]    ALLERGIES:   Allergies    ampicillin (Rash)  levofloxacin (Rash)    Intolerances    Substance Use:   (  ) never used  (  ) other:  Tobacco Usage:  (   ) never smoked   (   ) former smoker   (   ) current smoker  (     ) pack year  (        ) last cigarette date  Alcohol Usage:    Family History   IBD (  ) Yes   (  ) No  GI Malignancy (  )  Yes    (  ) No    VITAL SIGNS:   Vital Signs Last 24 Hrs  T(C): 36.6 (10 Jul 2025 07:00), Max: 37.7 (09 Jul 2025 15:31)  T(F): 97.8 (10 Jul 2025 07:00), Max: 99.8 (09 Jul 2025 15:31)  HR: 65 (10 Jul 2025 07:00) (63 - 70)  BP: 112/68 (10 Jul 2025 07:00) (106/61 - 128/72)  BP(mean): 90 (09 Jul 2025 15:31) (90 - 90)  RR: 17 (10 Jul 2025 07:00) (17 - 18)  SpO2: 96% (10 Jul 2025 07:00) (96% - 98%)    Parameters below as of 10 Jul 2025 07:00  Patient On (Oxygen Delivery Method): room air    I&O's Summary    PHYSICAL EXAM:   GENERAL:  No acute distress  HEENT:  NC/AT, conjunctiva clear, sclera anicteric  CHEST:  No increased effort  HEART:  Regular rate  ABDOMEN:  Soft, non-tender, non-distended, no rebound or guarding  EXTREMITIES: No edema  SKIN:  Warm, dry  NEURO:  Calm, cooperative    LABS:                        10.7   30.55 )-----------( 102      ( 10 Jul 2025 05:48 )             34.6     Hemoglobin: 10.7 g/dL (07-10-25 @ 05:48)  Hemoglobin: 11.4 g/dL (07-09-25 @ 18:19)  Hemoglobin: 10.6 g/dL (07-08-25 @ 14:55)    07-10    138  |  109  |  19  ----------------------------<  91  3.5   |  18  |  1.2    Ca    8.3[L]      10 Jul 2025 05:48  Mg     1.8     07-10    TPro  4.3[L]  /  Alb  2.9[L]  /  TBili  0.3  /  DBili  x   /  AST  19  /  ALT  13  /  AlkPhos  54  07-10    LIVER FUNCTIONS - ( 10 Jul 2025 05:48 )  Alb: 2.9 g/dL / Pro: 4.3 g/dL / ALK PHOS: 54 U/L / ALT: 13 U/L / AST: 19 U/L / GGT: x                 Culture - Blood (collected 08 Jul 2025 22:30)  Source: Blood Blood  Preliminary Report (10 Jul 2025 02:03):    No growth at 24 hours    Culture - Blood (collected 08 Jul 2025 22:30)  Source: Blood Blood  Preliminary Report (10 Jul 2025 02:03):    No growth at 24 hours    Urinalysis with Rflx Culture (collected 08 Jul 2025 20:40)    Culture - Urine (collected 08 Jul 2025 20:40)  Source: Clean Catch None  Final Report (09 Jul 2025 22:15):    <10,000 CFU/mL Normal Urogenital Nilda    Culture - Stool (collected 08 Jul 2025 17:25)  Source: Stool Feces  Preliminary Report (09 Jul 2025 22:21):    No enteric pathogens to date: Final culture pending    Clostridium difficile GDH Interpretation: Negative for toxigenic C. Difficile.  This specimen is negative for C.  Difficile glutamate dehydrogenase (GDH) antigen and negative for C.  Difficile Toxins A & B, by EIA.  GDH is a highly sensitive screening  marker for C. Difficile that is produced in large amounts by all C.  Difficile strains, both toxigenic and nontoxigenic.  This assay has not  been validated as a test of cure.  Repeat testing during the same episode  of diarrhea is of limited value and is discouraged.  The results of this  assay should always be interpreted in conjunction with patient's clinical  history. (07-08-25 @ 17:22)      RADIOLOGY & ADDITIONAL STUDIES:      ACC: [id number] EXAM:  CT ABDOMEN AND PELVIS IC   ORDERED BY: MARGARITA TALBERT     PROCEDURE DATE:  07/08/2025      INTERPRETATION:  CLINICAL INFORMATION: Abdominal pain. Diarrhea. WBC   48.57    PMHx of suspected CLL. Post cervical cancer s/p radiation >10   years ago.    COMPARISON: CT scan of the abdomen and pelvis dated 1/16/2025    CONTRAST/COMPLICATIONS:  IV Contrast:   The patient received 95 ml of Omnipaque 350 with 5 ml   discarded.  Oral Contrast:  None  Complications:  None reported at time of study completion    PROCEDURE:  CT of the Abdomen and Pelvis was performed.  Sagittal and coronal reformats were performed.    FINDINGS:    TUBES AND LINES: None.  LOWER CHEST: There are mild reticular opacities at the lung bases. No   pleural or pericardial effusion.    LIVER: The liver is normal in size with no evidence of solid mass. The   portal vein is patent.  BILE DUCTS: Normal caliber.  GALLBLADDER: Post cholecystectomy    SPLEEN: Splenomegaly (16 cm in length). Splenic hypodensity too small for   further evaluation.  PANCREAS: The pancreas is mildly atrophic. No evidence of mass or   pancreatitis.  ADRENALS: Within normal limits.  KIDNEYS/URETERS: There is symmetric renal enhancement. There is moderate   right and mild left hydronephrosis and hydroureter to the level of the   mid pelvis. No ureteral stones are identified.    BLADDER: Bladder wall enhancement is again noted.  REPRODUCTIVE ORGANS: Post hysterectomy    BOWEL: Surgical clips and anastomosis noted in the RLQ. No evidence of   bowel obstruction, colitis, or inflammatory process. Normal caliber   appendix.  PERITONEUM/RETROPERITONEUM: No ascites. No free intraperitoneal air.  VESSELS: Aortoiliac calcifications are noted with no evidence of   abdominal aortic aneurysm. 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  LYMPH NODES: No abdominal or pelvic adenopathy.  BONES: Degenerative changes of the spine.  ABDOMINAL WALL: Unremarkable    IMPRESSION:    1. There is moderate right and mild left hydronephrosis and hydroureter   to the level of the mid pelvis. No ureteral stones are identified. A 2-3   hour delayed CT scan, allowing time for contrast to extend through the   ureters, may be helpful to further delineate the site of obstruction. (No   additional contrast is necessary.)    2. Splenomegaly (16 cm in length).    3. Surgical clips and anastomosis noted in the RLQ.    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  Patient stated that she has up to 5 episodes of loose stools per day, few episodes that wake her up at night, episodes also occur while she is fasting. Diarrhea is non bloody and consists of soft stool with no mucus. It is not associated with bloating or cramping abdominal pain. Patient also reports she has been having frequent UTIs over the past few months, and currently reports dysuria. Denies fevers, chills, chest pain, shortness of breath, nausea, vomiting, or hematuria.    GI is consulted for chronic diarrhea. Patient reported that she had diarrhea since 20 years. On average, she had 4 BMs per day. Her stools are soft and pudding like with some days of liquid watery stool. It has been happening continuous without any episode of constipation or normal bowel movement. She does not report any foul smelling diarrhea. Her stool mostly floats on the bowl. She had an EGD 2 years ago for heart burn which was unremarkable according to her. She still complains of heart burn. She also reports tenesmus and fecal incontinence and is not able to make it to rest room sometimes.  She reports feeling always hungry. She followed with GI years ago but nobody was able to figure out the reason of her problems so she stopped following. No records on the chart.  She does not complain of fever. Her symptoms get worse with spicy food and have stopped eating chillies. She does not have any family history of celiac disease or IBD. Her sister also had some genital cancer but is not sure what type it was.      In the ED, patient was HD stable. Labs were significant for 48k (lymphocyte predominant), Hb 10.7 (baseline 11), creat 1.2 (baseline 0.9), UA positive. CTAP showed surgical clips and anastomosis in RLQ moderate right and mild left hydronephrosis and hydroureter to the level of the mid pelvis, splenomegaly up to 16 cm in length. WBC are improving (30.5K). C diff is negative. RVP negative. GI PCR, ova and parasites, stool culture and stool osmolarity are pending. 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       PAST MEDICAL & SURGICAL HISTORY:  Arthritis  Cervical cancer  No significant past surgical history    REVIEW OF SYSTEMS:   CONSTITUTIONAL: No weakness, fevers   EYES/ENT: No visual changes;  No vertigo or throat pain   NECK: No pain or stiffness  RESPIRATORY: No cough, wheezing, hemoptysis; No shortness of breath  CARDIOVASCULAR: No chest pain or palpitations  GASTROINTESTINAL: No abdominal or epigastric pain. No nausea, vomiting, or hematemesis; No melena or hematochezia.  GENITOURINARY: No  hematuria  NEUROLOGICAL: No numbness or weakness  SKIN: No itching, burning, rashes, or lesions   All other review of systems is negative unless indicated above.    	        MEDICATIONS:   MEDICATIONS  (STANDING):  cefTRIAXone   IVPB 1000 milliGRAM(s) IV Intermittent every 24 hours  heparin   Injectable 5000 Unit(s) SubCutaneous every 8 hours  lactated ringers. 1000 milliLiter(s) (75 mL/Hr) IV Continuous <Continuous>  sodium bicarbonate 650 milliGRAM(s) Oral two times a day  tamsulosin 0.4 milliGRAM(s) Oral at bedtime    MEDICATIONS  (PRN):  haloperidol    Injectable 5 milliGRAM(s) IntraMuscular once PRN agitation  loperamide 2 milliGRAM(s) Oral two times a day PRN Diarrhea    DIET:  Diet, Regular (07-09-25 @ 01:02) [Active]    ALLERGIES:   Allergies    ampicillin (Rash)  levofloxacin (Rash)        VITAL SIGNS:   Vital Signs Last 24 Hrs  T(C): 36.6 (10 Jul 2025 07:00), Max: 37.7 (09 Jul 2025 15:31)  T(F): 97.8 (10 Jul 2025 07:00), Max: 99.8 (09 Jul 2025 15:31)  HR: 65 (10 Jul 2025 07:00) (63 - 70)  BP: 112/68 (10 Jul 2025 07:00) (106/61 - 128/72)  BP(mean): 90 (09 Jul 2025 15:31) (90 - 90)  RR: 17 (10 Jul 2025 07:00) (17 - 18)  SpO2: 96% (10 Jul 2025 07:00) (96% - 98%)    Parameters below as of 10 Jul 2025 07:00  Patient On (Oxygen Delivery Method): room air    I&O's Summary    PHYSICAL EXAM:   GENERAL:  No acute distress  HEENT:  NC/AT, conjunctiva clear, sclera anicteric  CHEST:  No increased effort  HEART:  Regular rate  ABDOMEN:  Soft, non-tender, non-distended, no rebound or guarding  EXTREMITIES: No edema  SKIN:  Warm, dry  NEURO:  Calm, cooperative    LABS:                        10.7   30.55 )-----------( 102      ( 10 Jul 2025 05:48 )             34.6     Hemoglobin: 10.7 g/dL (07-10-25 @ 05:48)  Hemoglobin: 11.4 g/dL (07-09-25 @ 18:19)  Hemoglobin: 10.6 g/dL (07-08-25 @ 14:55)    07-10    138  |  109  |  19  ----------------------------<  91  3.5   |  18  |  1.2    Ca    8.3[L]      10 Jul 2025 05:48  Mg     1.8     07-10    TPro  4.3[L]  /  Alb  2.9[L]  /  TBili  0.3  /  DBili  x   /  AST  19  /  ALT  13  /  AlkPhos  54  07-10    LIVER FUNCTIONS - ( 10 Jul 2025 05:48 )  Alb: 2.9 g/dL / Pro: 4.3 g/dL / ALK PHOS: 54 U/L / ALT: 13 U/L / AST: 19 U/L / GGT: x                 Culture - Blood (collected 08 Jul 2025 22:30)  Source: Blood Blood  Preliminary Report (10 Jul 2025 02:03):    No growth at 24 hours    Culture - Blood (collected 08 Jul 2025 22:30)  Source: Blood Blood  Preliminary Report (10 Jul 2025 02:03):    No growth at 24 hours    Urinalysis with Rflx Culture (collected 08 Jul 2025 20:40)    Culture - Urine (collected 08 Jul 2025 20:40)  Source: Clean Catch None  Final Report (09 Jul 2025 22:15):    <10,000 CFU/mL Normal Urogenital Nilda    Culture - Stool (collected 08 Jul 2025 17:25)  Source: Stool Feces  Preliminary Report (09 Jul 2025 22:21):    No enteric pathogens to date: Final culture pending    Clostridium difficile GDH Interpretation: Negative for toxigenic C. Difficile.  This specimen is negative for C.  Difficile glutamate dehydrogenase (GDH) antigen and negative for C.  Difficile Toxins A & B, by EIA.  GDH is a highly sensitive screening  marker for C. Difficile that is produced in large amounts by all C.  Difficile strains, both toxigenic and nontoxigenic.  This assay has not  been validated as a test of cure.  Repeat testing during the same episode  of diarrhea is of limited value and is discouraged.  The results of this  assay should always be interpreted in conjunction with patient's clinical  history. (07-08-25 @ 17:22)      RADIOLOGY & ADDITIONAL STUDIES:      ACC: [id number] EXAM:  CT ABDOMEN AND PELVIS IC   ORDERED BY: MARGARITA TALBERT     PROCEDURE DATE:  07/08/2025      INTERPRETATION:  CLINICAL INFORMATION: Abdominal pain. Diarrhea. WBC   48.57    PMHx of suspected CLL. Post cervical cancer s/p radiation >10   years ago.    COMPARISON: CT scan of the abdomen and pelvis dated 1/16/2025    CONTRAST/COMPLICATIONS:  IV Contrast:   The patient received 95 ml of Omnipaque 350 with 5 ml   discarded.  Oral Contrast:  None  Complications:  None reported at time of study completion    PROCEDURE:  CT of the Abdomen and Pelvis was performed.  Sagittal and coronal reformats were performed.    FINDINGS:    TUBES AND LINES: None.  LOWER CHEST: There are mild reticular opacities at the lung bases. No   pleural or pericardial effusion.    LIVER: The liver is normal in size with no evidence of solid mass. The   portal vein is patent.  BILE DUCTS: Normal caliber.  GALLBLADDER: Post cholecystectomy    SPLEEN: Splenomegaly (16 cm in length). Splenic hypodensity too small for   further evaluation.  PANCREAS: The pancreas is mildly atrophic. No evidence of mass or   pancreatitis.  ADRENALS: Within normal limits.  KIDNEYS/URETERS: There is symmetric renal enhancement. There is moderate   right and mild left hydronephrosis and hydroureter to the level of the   mid pelvis. No ureteral stones are identified.    BLADDER: Bladder wall enhancement is again noted.  REPRODUCTIVE ORGANS: Post hysterectomy    BOWEL: Surgical clips and anastomosis noted in the RLQ. No evidence of   bowel obstruction, colitis, or inflammatory process. Normal caliber   appendix.  PERITONEUM/RETROPERITONEUM: No ascites. No free intraperitoneal air.  VESSELS: Aortoiliac calcifications are noted with no evidence of   abdominal aortic aneurysm. 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  LYMPH NODES: No abdominal or pelvic adenopathy.  BONES: Degenerative changes of the spine.  ABDOMINAL WALL: Unremarkable    IMPRESSION:    1. There is moderate right and mild left hydronephrosis and hydroureter   to the level of the mid pelvis. No ureteral stones are identified. A 2-3   hour delayed CT scan, allowing time for contrast to extend through the   ureters, may be helpful to further delineate the site of obstruction. (No   additional contrast is necessary.)    2. Splenomegaly (16 cm in length).    3. Surgical clips and anastomosis noted in the RLQ.    --- End of Report ---

## 2025-07-10 NOTE — CONSULT NOTE ADULT - TIME BILLING
As per prior assessment - in January   her hydronephrosis resolved with catheter placement  recommend same to re-assess bladder as the source of obstruction  she needs to f/u as an outpatient to assess further
I have personally seen and examined this patient.    I have reviewed all pertinent clinical information and reviewed all relevant imaging and diagnostic studies personally.   I counseled the patient about diagnostic testing and treatment plan. All questions were answered.   I discussed recommendations with the primary team.
Reviewed all pertinent clinical information and reviewed all relevant imaging and diagnostic studies.  Counseled the patient  about diagnostic testing and treatment plan. All questions were answered.  Discussed recommendations with the primary team and coordinated care. Time spent on this encounter excludes teaching time and separately reported services.

## 2025-07-10 NOTE — PROGRESS NOTE ADULT - ASSESSMENT
ASSESSMENT  85-year-old female with PMH cervical cancer (s/p treatment 10 years ago), neurogenic bladder, arthritis, suspected CLL (follows w/ Odaimi)  presents to the ED for evaluation of intermittent diarrhea since January.  Patient states she has up to 5 episodes of loose stools per day, few episodes that wake her up at night, episodes also occur while shes fasting. Diarrhea is non bloodly and consists of soft stool, no mucus. Not associated with bloating or cramping abdominal pain. Patient also reports she has been having frequent UTIs over the past few months, and currently reports dysuria.  Denies fevers, chills, chest pain, shortness of breath, nausea, vomiting, or hematuria    IMPRESSION  #Chronic Diarrhea  - GI PCR negative   - C diff testing negative     #CLL  #Cervical Cancer   #Bilateral Hydro Nephrosis   - CT Abdomen and Pelvis w/ IV Cont (07.08.25 @ 16:46): IMPRESSION: 1. There is moderate right and mild left hydronephrosis and hydroureter  to the level of the mid pelvis. No ureteral stones are identified. A 2-3  hour delayed CT scan, allowing time for contrast to extend through the  ureters, may be helpful to further delineate the site of obstruction. (No  additional contrast is necessary.) 2. Splenomegaly (16 cm in length). 3. Surgical clips and anastomosis noted in the RLQ.    #Pyuria     #Neurogenic Bladder    #Abx allergy: ampicillin (Rash)  levofloxacin (Rash)    RECOMMENDATIONS  - Urine Cx 7/8 is NG   - can stop ceftriaxone   - recall as needed    Please call or message on [employer] Teams if with any questions.  Spectra [phone]

## 2025-07-10 NOTE — PROGRESS NOTE ADULT - SUBJECTIVE AND OBJECTIVE BOX
SUBJECTIVE/OVERNIGHT EVENTS  Today is hospital day 2d. This morning patient was seen and examined at bedside, resting comfortably in bed. No acute or major events overnight.    HOSPITAL COURSE      CODE STATUS:    FAMILY COMMUNICATION  Contact date:  Name of person contacted:  Relationship to patient:  Communication details:    MEDICATIONS  STANDING MEDICATIONS  cefTRIAXone   IVPB 1000 milliGRAM(s) IV Intermittent every 24 hours  heparin   Injectable 5000 Unit(s) SubCutaneous every 8 hours  lactated ringers. 1000 milliLiter(s) IV Continuous <Continuous>  sodium bicarbonate 650 milliGRAM(s) Oral two times a day  tamsulosin 0.4 milliGRAM(s) Oral at bedtime    PRN MEDICATIONS  haloperidol    Injectable 5 milliGRAM(s) IntraMuscular once PRN  loperamide 2 milliGRAM(s) Oral two times a day PRN    VITALS  T(F): 97.8 (07-10-25 @ 07:00), Max: 99.8 (07-09-25 @ 15:31)  HR: 65 (07-10-25 @ 07:00) (63 - 70)  BP: 112/68 (07-10-25 @ 07:00) (106/61 - 128/72)  RR: 17 (07-10-25 @ 07:00) (17 - 18)  SpO2: 96% (07-10-25 @ 07:00) (96% - 98%)    PHYSICAL EXAM  GENERAL  (x  ) NAD, lying in bed comfortably     (  ) obtunded     (  ) lethargic     (  ) somnolent    HEAD  (  x) Atraumatic     (  ) hematoma     (  ) laceration (specify location:       )     NECK  ( x ) Supple     (  ) neck stiffness     (  ) nuchal rigidity     (  )  no JVD     (  ) JVD present ( -- cm)    HEART  Rate -->  ( x ) normal rate    (  ) bradycardic    (  ) tachycardic  Rhythm -->  (  ) regular    (  ) regularly irregular    (  ) irregularly irregular  Murmurs -->  (  ) normal s1/s2    (  ) systolic murmur    (  ) diastolic murmur    (  ) continuous murmur     (  ) S3 present    (  ) S4 present    LUNGS  (x  )Unlabored respirations     (  ) tachypnea  (  ) B/L air entry     (  ) decreased breath sounds in:  (location     )    (  ) no adventitious sound     (  ) crackles     (  ) wheezing      (  ) rhonchi      (specify location:       )  (  ) chest wall tenderness (specify location:       )    ABDOMEN  ( x ) Soft     (  ) tense   |   (  ) nondistended     (  ) distended   |   (  ) +BS     (  ) hypoactive bowel sounds     (  ) hyperactive bowel sounds  (  ) nontender     (  ) RUQ tenderness     (  ) RLQ tenderness     (  ) LLQ tenderness     (  ) epigastric tenderness     (  ) diffuse tenderness  (  ) Splenomegaly      (  ) Hepatomegaly      (  ) Jaundice     (  ) ecchymosis     EXTREMITIES  ( x ) Normal     (  ) Rash     (  ) ecchymosis     (  ) varicose veins      (  ) pitting edema     (  ) non-pitting edema   (  ) ulceration     (  ) gangrene:     (location:     )    NERVOUS SYSTEM  (  ) A&Ox3     ( x ) confused     (  ) lethargic  CN II-XII:     (  ) Intact     (  ) focal deficits  (Specify:     )   Upper extremities:     (  ) strength X/5     (  ) focal deficit (specify:    )  Lower extremities:     (  ) strength  X/5    (  ) focal deficit (specify:    )    SKIN  ( x ) No rashes or lesions     (  ) maculopapular rash     (  ) pustules     (  ) vesicles     (  ) ulcer     (  ) ecchymosis     (specify location:     )    (  ) Indwelling Ladd Catheter   Date insterted:    Reason (  ) Critical illness     (  ) urinary retention    (  ) Accurate Ins/Outs Monitoring     (  ) CMO patient    (  ) Central Line  Date inserted:  Location: (  ) Right IJ   (  ) Left IJ   (  ) Right Fem   (  ) Left Fem    (  ) SPC  (  ) pigtail  (  ) PEG tube  (  ) colostomy  (  ) jejunostomy  (  ) U-Dall    LABS             10.7   30.55 )-----------( 102      ( 07-10-25 @ 05:48 )             34.6     138  |  109  |  19  -------------------------<  91   07-10-25 @ 05:48  3.5  |  18  |  1.2    Ca      8.3     07-10-25 @ 05:48  Mg     1.8     07-10-25 @ 05:48    TPro  4.3  /  Alb  2.9  /  TBili  0.3  /  DBili  x   /  AST  19  /  ALT  13  /  AlkPhos  54  /  GGT  x     07-10-25 @ 05:48        Urinalysis Basic - ( 10 Jul 2025 05:48 )    Color: x / Appearance: x / SG: x / pH: x  Gluc: 91 mg/dL / Ketone: x  / Bili: x / Urobili: x   Blood: x / Protein: x / Nitrite: x   Leuk Esterase: x / RBC: x / WBC x   Sq Epi: x / Non Sq Epi: x / Bacteria: x          Culture - Blood (collected 08 Jul 2025 22:30)  Source: Blood Blood  Preliminary Report (10 Jul 2025 02:03):    No growth at 24 hours    Culture - Blood (collected 08 Jul 2025 22:30)  Source: Blood Blood  Preliminary Report (10 Jul 2025 02:03):    No growth at 24 hours    Urinalysis with Rflx Culture (collected 08 Jul 2025 20:40)    Culture - Urine (collected 08 Jul 2025 20:40)  Source: Clean Catch None  Final Report (09 Jul 2025 22:15):    <10,000 CFU/mL Normal Urogenital Nilda    Culture - Stool (collected 08 Jul 2025 17:25)  Source: Stool Feces  Preliminary Report (09 Jul 2025 22:21):    No enteric pathogens to date: Final culture pending      IMAGING

## 2025-07-10 NOTE — PROGRESS NOTE ADULT - SUBJECTIVE AND OBJECTIVE BOX
LOMBARDI, FELICE  85y, Female  Allergy: ampicillin (Rash)  levofloxacin (Rash)      LOS  2d    CHIEF COMPLAINT: Diarrhea (10 Jul 2025 08:43)      INTERVAL EVENTS/HPI  - No acute events overnight  - T(F): , Max: 99.5 (07-10-25 @ 16:35)  - Denies any worsening symptoms  - Tolerating medication  - WBC Count: 30.55 (07-10-25 @ 05:48)  WBC Count: 38.26 (07-09-25 @ 18:19)     - Creatinine: 1.2 (07-10-25 @ 05:48)  Creatinine: 1.1 (07-09-25 @ 18:19)       ROS  General: Denies rigors, nightsweats  HEENT: Denies headache, rhinorrhea, sore throat, eye pain  CV: Denies CP, palpitations  PULM: Denies wheezing, hemoptysis  GI: Denies hematemesis, hematochezia, melena  : Denies discharge, hematuria  MSK: Denies arthralgias, myalgias  SKIN: Denies rash, lesions  NEURO: Denies paresthesias, weakness  PSYCH: Denies depression, anxiety    VITALS:  T(F): 99.5, Max: 99.5 (07-10-25 @ 16:35)  HR: 68  BP: 115/66  RR: 18Vital Signs Last 24 Hrs  T(C): 37.5 (10 Jul 2025 16:35), Max: 37.5 (10 Jul 2025 16:35)  T(F): 99.5 (10 Jul 2025 16:35), Max: 99.5 (10 Jul 2025 16:35)  HR: 68 (10 Jul 2025 16:35) (63 - 68)  BP: 115/66 (10 Jul 2025 16:35) (106/61 - 115/66)  BP(mean): --  RR: 18 (10 Jul 2025 16:35) (17 - 18)  SpO2: 97% (10 Jul 2025 16:35) (96% - 97%)    Parameters below as of 10 Jul 2025 16:35  Patient On (Oxygen Delivery Method): room air        PHYSICAL EXAM:  Gen: NAD, resting in bed  HEENT: Normocephalic, atraumatic  Neck: supple, no lymphadenopathy  CV: Regular rate & regular rhythm  Lungs: decreased BS at bases, no fremitus  Abdomen: Soft, BS present  Ext: Warm, well perfused  Neuro: non focal, awake  Skin: no rash, no erythema  Lines: no phlebitis    FH: Non-contributory  Social Hx: Non-contributory    TESTS & MEASUREMENTS:                        10.7   30.55 )-----------( 102      ( 10 Jul 2025 05:48 )             34.6     07-10    138  |  109  |  19  ----------------------------<  91  3.5   |  18  |  1.2    Ca    8.3[L]      10 Jul 2025 05:48  Mg     1.8     07-10    TPro  4.3[L]  /  Alb  2.9[L]  /  TBili  0.3  /  DBili  x   /  AST  19  /  ALT  13  /  AlkPhos  54  07-10      LIVER FUNCTIONS - ( 10 Jul 2025 05:48 )  Alb: 2.9 g/dL / Pro: 4.3 g/dL / ALK PHOS: 54 U/L / ALT: 13 U/L / AST: 19 U/L / GGT: x           Urinalysis Basic - ( 10 Jul 2025 05:48 )    Color: x / Appearance: x / SG: x / pH: x  Gluc: 91 mg/dL / Ketone: x  / Bili: x / Urobili: x   Blood: x / Protein: x / Nitrite: x   Leuk Esterase: x / RBC: x / WBC x   Sq Epi: x / Non Sq Epi: x / Bacteria: x        Culture - Blood (collected 07-08-25 @ 22:30)  Source: Blood Blood  Preliminary Report (07-10-25 @ 02:03):    No growth at 24 hours    Culture - Blood (collected 07-08-25 @ 22:30)  Source: Blood Blood  Preliminary Report (07-10-25 @ 02:03):    No growth at 24 hours    Urinalysis with Rflx Culture (collected 07-08-25 @ 20:40)    Culture - Urine (collected 07-08-25 @ 20:40)  Source: Clean Catch None  Final Report (07-09-25 @ 22:15):    <10,000 CFU/mL Normal Urogenital Nilda    Culture - Stool (collected 07-08-25 @ 17:25)  Source: Stool Feces  Final Report (07-10-25 @ 17:49):    No enteric pathogens isolated.    (Stool culture examined for Salmonella,    Shigella, Campylobacter, Aeromonas, Plesiomonas,    Vibrio, E.coli O157 and Yersinia)            INFECTIOUS DISEASES TESTING      INFLAMMATORY MARKERS      RADIOLOGY & ADDITIONAL TESTS:  I have personally reviewed the last available Chest xray  CXR      CT  CT Abdomen and Pelvis w/ IV Cont:   ACC: [id number] EXAM:  CT ABDOMEN AND PELVIS IC   ORDERED BY: MARGARITA TALBERT     PROCEDURE DATE:  07/08/2025          INTERPRETATION:  CLINICAL INFORMATION: Abdominal pain. Diarrhea. WBC   48.57    PMHx of suspected CLL. Post cervical cancer s/p radiation >10   years ago.    COMPARISON: CT scan of the abdomen and pelvis dated 1/16/2025    CONTRAST/COMPLICATIONS:  IV Contrast:   The patient received 95 ml of Omnipaque 350 with 5 ml   discarded.  Oral Contrast:  None  Complications:  None reported at time of study completion    PROCEDURE:  CT of the Abdomen and Pelvis was performed.  Sagittal and coronal reformats were performed.    FINDINGS:    TUBES AND LINES: None.  LOWER CHEST: There are mild reticular opacities at the lung bases. No   pleural or pericardial effusion.    LIVER: The liver is normal in size with no evidence of solid mass. The   portal vein is patent.  BILE DUCTS: Normal caliber.  GALLBLADDER: Post cholecystectomy    SPLEEN: Splenomegaly (16 cm in length). Splenic hypodensity too small for   further evaluation.  PANCREAS: The pancreas is mildly atrophic. No evidence of mass or   pancreatitis.  ADRENALS: Within normal limits.  KIDNEYS/URETERS: There is symmetric renal enhancement. There is moderate   right and mild left hydronephrosis and hydroureter to the level of the   mid pelvis. No ureteral stones are identified.    BLADDER: Bladder wall enhancement is again noted.  REPRODUCTIVE ORGANS: Post hysterectomy    BOWEL: Surgical clips and anastomosis noted in the RLQ. No evidence of   bowel obstruction, colitis, or inflammatory process. Normal caliber   appendix.  PERITONEUM/RETROPERITONEUM: No ascites. No free intraperitoneal air.  VESSELS: Aortoiliac calcifications are noted with no evidence of   abdominal aortic aneurysm. 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  LYMPH NODES: No abdominal or pelvic adenopathy.  BONES: Degenerative changes of the spine.  ABDOMINAL WALL: Unremarkable    IMPRESSION:    1. There is moderate right and mild left hydronephrosis and hydroureter   to the level of the mid pelvis. No ureteral stones are identified. A 2-3   hour delayed CT scan, allowing time for contrast to extend through the   ureters, may be helpful to further delineate the site of obstruction. (No   additional contrast is necessary.)    2. Splenomegaly (16 cm in length).    3. Surgical clips and anastomosis noted in the RLQ.    --- End of Report ---            SHER IRENE MD; Attending Interventional Radiologist  This document has been electronically signed. Jul 8 2025  7:40PM (07-08-25 @ 16:46)      CARDIOLOGY TESTING      MEDICATIONS  cefTRIAXone   IVPB 1000 IV Intermittent every 24 hours  heparin   Injectable 5000 SubCutaneous every 8 hours  lactated ringers. 1000 IV Continuous <Continuous>  sodium bicarbonate 650 Oral two times a day  tamsulosin 0.4 Oral at bedtime      WEIGHT  Weight (kg): 53.2 (01-12-25 @ 22:20)  Creatinine: 1.2 mg/dL (07-10-25 @ 05:48)      ANTIBIOTICS:  cefTRIAXone   IVPB 1000 milliGRAM(s) IV Intermittent every 24 hours      All available historical records have been reviewed

## 2025-07-11 LAB
24R-OH-CALCIDIOL SERPL-MCNC: 6 NG/ML — LOW
ANION GAP SERPL CALC-SCNC: 9 MMOL/L — SIGNIFICANT CHANGE UP (ref 7–14)
BASOPHILS # BLD AUTO: 0.05 K/UL — SIGNIFICANT CHANGE UP (ref 0–0.2)
BASOPHILS NFR BLD AUTO: 0.2 % — SIGNIFICANT CHANGE UP (ref 0–1)
BUN SERPL-MCNC: 18 MG/DL — SIGNIFICANT CHANGE UP (ref 10–20)
CALCIUM SERPL-MCNC: 8.4 MG/DL — SIGNIFICANT CHANGE UP (ref 8.4–10.5)
CHLORIDE SERPL-SCNC: 110 MMOL/L — SIGNIFICANT CHANGE UP (ref 98–110)
CO2 SERPL-SCNC: 23 MMOL/L — SIGNIFICANT CHANGE UP (ref 17–32)
CREAT SERPL-MCNC: 1 MG/DL — SIGNIFICANT CHANGE UP (ref 0.7–1.5)
CRP SERPL-MCNC: 15.3 MG/L — HIGH
EGFR: 56 ML/MIN/1.73M2 — LOW
EGFR: 56 ML/MIN/1.73M2 — LOW
EOSINOPHIL # BLD AUTO: 0.11 K/UL — SIGNIFICANT CHANGE UP (ref 0–0.7)
EOSINOPHIL NFR BLD AUTO: 0.4 % — SIGNIFICANT CHANGE UP (ref 0–8)
ERYTHROCYTE [SEDIMENTATION RATE] IN BLOOD: 11 MM/HR — SIGNIFICANT CHANGE UP (ref 0–20)
FERRITIN SERPL-MCNC: 45 NG/ML — SIGNIFICANT CHANGE UP (ref 13–330)
FOLATE SERPL-MCNC: 9.4 NG/ML — SIGNIFICANT CHANGE UP
GLUCOSE SERPL-MCNC: 85 MG/DL — SIGNIFICANT CHANGE UP (ref 70–99)
HCT VFR BLD CALC: 33.1 % — LOW (ref 37–47)
HCT VFR BLD CALC: 34.9 % — LOW (ref 37–47)
HGB BLD-MCNC: 10.6 G/DL — LOW (ref 12–16)
HGB BLD-MCNC: 11 G/DL — LOW (ref 12–16)
IMM GRANULOCYTES NFR BLD AUTO: 0.2 % — SIGNIFICANT CHANGE UP (ref 0.1–0.3)
IRON SATN MFR SERPL: 17 UG/DL — LOW (ref 35–150)
IRON SATN MFR SERPL: 7 % — LOW (ref 15–50)
LYMPHOCYTES # BLD AUTO: 23.42 K/UL — HIGH (ref 1.2–3.4)
LYMPHOCYTES # BLD AUTO: 82.8 % — HIGH (ref 20.5–51.1)
MAGNESIUM SERPL-MCNC: 1.7 MG/DL — LOW (ref 1.8–2.4)
MCHC RBC-ENTMCNC: 28.9 PG — SIGNIFICANT CHANGE UP (ref 27–31)
MCHC RBC-ENTMCNC: 29.1 PG — SIGNIFICANT CHANGE UP (ref 27–31)
MCHC RBC-ENTMCNC: 31.5 G/DL — LOW (ref 32–37)
MCHC RBC-ENTMCNC: 32 G/DL — SIGNIFICANT CHANGE UP (ref 32–37)
MCV RBC AUTO: 90.9 FL — SIGNIFICANT CHANGE UP (ref 81–99)
MCV RBC AUTO: 91.8 FL — SIGNIFICANT CHANGE UP (ref 81–99)
MONOCYTES # BLD AUTO: 1.66 K/UL — HIGH (ref 0.1–0.6)
MONOCYTES NFR BLD AUTO: 5.9 % — SIGNIFICANT CHANGE UP (ref 1.7–9.3)
NEUTROPHILS # BLD AUTO: 3.01 K/UL — SIGNIFICANT CHANGE UP (ref 1.4–6.5)
NEUTROPHILS NFR BLD AUTO: 10.5 % — LOW (ref 42.2–75.2)
NRBC BLD AUTO-RTO: 0 /100 WBCS — SIGNIFICANT CHANGE UP (ref 0–0)
NRBC BLD AUTO-RTO: 0 /100 WBCS — SIGNIFICANT CHANGE UP (ref 0–0)
PLATELET # BLD AUTO: 119 K/UL — LOW (ref 130–400)
PLATELET # BLD AUTO: 98 K/UL — LOW (ref 130–400)
PMV BLD: 11.2 FL — HIGH (ref 7.4–10.4)
PMV BLD: 11.4 FL — HIGH (ref 7.4–10.4)
POTASSIUM SERPL-MCNC: 3.9 MMOL/L — SIGNIFICANT CHANGE UP (ref 3.5–5)
POTASSIUM SERPL-SCNC: 3.9 MMOL/L — SIGNIFICANT CHANGE UP (ref 3.5–5)
RBC # BLD: 3.64 M/UL — LOW (ref 4.2–5.4)
RBC # BLD: 3.8 M/UL — LOW (ref 4.2–5.4)
RBC # FLD: 15.9 % — HIGH (ref 11.5–14.5)
RBC # FLD: 16 % — HIGH (ref 11.5–14.5)
SODIUM SERPL-SCNC: 142 MMOL/L — SIGNIFICANT CHANGE UP (ref 135–146)
T3 SERPL-MCNC: 67 NG/DL — LOW (ref 80–200)
T4 AB SER-ACNC: 5.7 UG/DL — SIGNIFICANT CHANGE UP (ref 4.6–12)
T4 FREE SERPL-MCNC: 0.9 NG/DL — SIGNIFICANT CHANGE UP (ref 0.9–1.8)
TIBC SERPL-MCNC: 242 UG/DL — SIGNIFICANT CHANGE UP (ref 220–430)
TRANSFERRIN SERPL-MCNC: 206 MG/DL — SIGNIFICANT CHANGE UP (ref 200–360)
TSH SERPL-MCNC: 3.08 UIU/ML — SIGNIFICANT CHANGE UP (ref 0.27–4.2)
UIBC SERPL-MCNC: 225 UG/DL — SIGNIFICANT CHANGE UP (ref 110–370)
VIT B12 SERPL-MCNC: 236 PG/ML — SIGNIFICANT CHANGE UP (ref 232–1245)
WBC # BLD: 28.3 K/UL — HIGH (ref 4.8–10.8)
WBC # BLD: 39.58 K/UL — HIGH (ref 4.8–10.8)
WBC # FLD AUTO: 28.3 K/UL — HIGH (ref 4.8–10.8)
WBC # FLD AUTO: 39.58 K/UL — HIGH (ref 4.8–10.8)

## 2025-07-11 PROCEDURE — 99233 SBSQ HOSP IP/OBS HIGH 50: CPT

## 2025-07-11 PROCEDURE — 99232 SBSQ HOSP IP/OBS MODERATE 35: CPT

## 2025-07-11 RX ORDER — FERROUS FUMARATE 324(106)MG
1 TABLET ORAL
Qty: 30 | Refills: 0
Start: 2025-07-11 | End: 2025-08-09

## 2025-07-11 RX ORDER — LOPERAMIDE HCL 1 MG/7.5ML
1 SOLUTION ORAL
Qty: 42 | Refills: 0
Start: 2025-07-11 | End: 2025-07-24

## 2025-07-11 RX ORDER — MAGNESIUM SULFATE 500 MG/ML
2 SYRINGE (ML) INJECTION ONCE
Refills: 0 | Status: COMPLETED | OUTPATIENT
Start: 2025-07-11 | End: 2025-07-11

## 2025-07-11 RX ORDER — FUROSEMIDE 10 MG/ML
1 INJECTION INTRAMUSCULAR; INTRAVENOUS
Refills: 0 | DISCHARGE

## 2025-07-11 RX ADMIN — Medication 25 GRAM(S): at 11:06

## 2025-07-11 RX ADMIN — HEPARIN SODIUM 5000 UNIT(S): 1000 INJECTION INTRAVENOUS; SUBCUTANEOUS at 13:02

## 2025-07-11 RX ADMIN — CEFTRIAXONE 100 MILLIGRAM(S): 500 INJECTION, POWDER, FOR SOLUTION INTRAMUSCULAR; INTRAVENOUS at 21:39

## 2025-07-11 RX ADMIN — Medication 650 MILLIGRAM(S): at 06:05

## 2025-07-11 RX ADMIN — HEPARIN SODIUM 5000 UNIT(S): 1000 INJECTION INTRAVENOUS; SUBCUTANEOUS at 06:05

## 2025-07-11 NOTE — DISCHARGE NOTE PROVIDER - HOSPITAL COURSE
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  Patient states she has up to 5 episodes of loose stools per day, few episodes that wake her up at night, episodes also occur while shes fasting. Diarrhea is non bloodly and consists of soft stool, no mucus. Not associated with bloating or cramping abdominal pain. Patient also reports she has been having frequent UTIs over the past few months, and currently reports dysuria.  Denies fevers, chills, chest pain, shortness of breath, nausea, vomiting, or hematuria    Vitals in the ED were unremarkable  Labs significant for 48k(lymphocyte predominant), Hb 10.7(baseline 11), creat 1.2 (baseline 0.9), UA positive    CTAP : There is moderate right and mild left hydronephrosis and hydroureter   to the level of the mid pelvis. No ureteral stones are identified. A 2-3   hour delayed CT scan, allowing time for contrast to extend through the   ureters, may be helpful to further delineate the site of obstruction. (No   additional contrast is necessary.)2. Splenomegaly (16 cm in length).    4B course: Pt was treated with IVF. C diff neg. Seen by GI who recommneded OP colonoscopy. Pt was also treatwed with rocephin for positive UA. Seen by urology: OP cystoscopy. UCx Neg and BCx NGTD. Stable for DC.       #Chronic Diarrhea  -HD stable, appears cachectic  -No SIRS  -4-5 episodes/ day of soft watery stools. No association with lactose intake  - CTAP w/ iv contrast: 1. There is moderate right and mild left hydronephrosis and hydroureter   to the level of the mid pelvis. No ureteral stones are identified. A 2-3   hour delayed CT scan, allowing time for contrast to extend through the   ureters, may be helpful to further delineate the site of obstruction. (No   additional contrast is necessary.)  2. Splenomegaly (16 cm in length).  3. Surgical clips and anastomosis noted in the RLQ.  -No h/o NSAID or PPI use  -Cdiff neg  -Fu GI PCR, stool cx, Bcx, ova parasites, stool osm (secretory vs osmotic diarrhea), calprotectin, fat OP   -ESR CRP vit ADEK level OP   -s/p IVF LR   - bicarb 17. started on po bicarb 650mg bid. repeat bicarb : 23. po bicarb dc'ed.   - GI Cs noted: OP colonoscopy      #KHANH on CKD II(likely prerenal)- resolved  #Hypomagnesemia-repleted  -Cr downtrending     #Dysuria  #pyuria  #Neurogenic bladder  #Chronic b/l hydro  -UA positive for WBC and LE   - UCX neg  - BCX NGTD  -CTAP: B/l hydro, no stone  -Fu repeat CT for delayed contrast clearance  -Seen by urologist Dr. bonilla in [facility] who reccomended bee  -Pt refusing bee in ED  -s/p aztreonam in ED, pt states she got a rash to ampicillin.  -ID recs noted: dc rocephin  - urology recs noted:  OP cystoscopy   - c/w home gemtesa   - repeat KBUS OP    #Anemia  -Hb around baseline   -Check iron, b12 , folate    #suspected CLL   -WBC 48k prev around 40s  -Splenomegaly on CT  -Fish abnormal- 13q deletions  -Follows with dr. Montgomery OP   - not on any treatments per HIE        85-year-old female with PMH cervical cancer (s/p treatment 10 years ago), neurogenic bladder, arthritis, suspected CLL (follows w/ Odaimi)  presents to the ED for evaluation of intermittent diarrhea since January.  Patient states she has up to 5 episodes of loose stools per day, few episodes that wake her up at night, episodes also occur while shes fasting. Diarrhea is non bloodly and consists of soft stool, no mucus. Not associated with bloating or cramping abdominal pain. Patient also reports she has been having frequent UTIs over the past few months, and currently reports dysuria.  Denies fevers, chills, chest pain, shortness of breath, nausea, vomiting, or hematuria    Vitals in the ED were unremarkable  Labs significant for 48k(lymphocyte predominant), Hb 10.7(baseline 11), creat 1.2 (baseline 0.9), UA positive    CTAP : There is moderate right and mild left hydronephrosis and hydroureter   to the level of the mid pelvis. No ureteral stones are identified. A 2-3   hour delayed CT scan, allowing time for contrast to extend through the   ureters, may be helpful to further delineate the site of obstruction. (No   additional contrast is necessary.)2. Splenomegaly (16 cm in length).    4B course: Pt was treated with IVF. C diff neg. Seen by GI who recommneded OP colonoscopy. Pt was also treatwed with rocephin for positive UA. Seen by urology: OP cystoscopy. UCx Neg and BCx NGTD. Stable for DC.       #Chronic Diarrhea  -HD stable, appears cachectic  -No SIRS  -4-5 episodes/ day of soft watery stools. No association with lactose intake  - CTAP w/ iv contrast: 1. There is moderate right and mild left hydronephrosis and hydroureter   to the level of the mid pelvis. No ureteral stones are identified. A 2-3   hour delayed CT scan, allowing time for contrast to extend through the   ureters, may be helpful to further delineate the site of obstruction. (No   additional contrast is necessary.)  2. Splenomegaly (16 cm in length).  3. Surgical clips and anastomosis noted in the RLQ.  -No h/o NSAID or PPI use  -Cdiff neg  -Fu GI PCR, stool cx, Bcx, ova parasites, stool osm (secretory vs osmotic diarrhea), calprotectin, fat OP   -ESR CRP vit ADEK level OP   -s/p IVF LR   - bicarb 17. started on po bicarb 650mg bid. repeat bicarb : 23. po bicarb dc'ed.   - GI Cs noted: OP colonoscopy    - loperamide prn    #KHANH on CKD II(likely prerenal)- resolved  #Hypomagnesemia-repleted  -Cr downtrending     #Dysuria  #pyuria  #Neurogenic bladder  #Chronic b/l hydro  -UA positive for WBC and LE   - UCX neg  - BCX NGTD  -CTAP: B/l hydro, no stone  -Fu repeat CT for delayed contrast clearance  -Seen by urologist Dr. bonilla in [facility] who reccomended bee  -Pt refusing bee in ED  -s/p aztreonam in ED, pt states she got a rash to ampicillin.  -ID recs noted: mariaa rocephin  - urology recs noted:  OP cystoscopy   - c/w home gemtesa   - repeat KBUS OP    #Anemia  -Hb around baseline   -Check iron, b12 , folate    #suspected CLL   -WBC 48k prev around 40s  -Splenomegaly on CT  -Fish abnormal- 13q deletions  -Follows with dr. Montgomery OP   - not on any treatments per HIE        85-year-old female with PMH cervical cancer (s/p treatment 10 years ago), neurogenic bladder, arthritis, suspected CLL (follows w/ Odaimi)  presents to the ED for evaluation of intermittent diarrhea since January.  Patient states she has up to 5 episodes of loose stools per day, few episodes that wake her up at night, episodes also occur while shes fasting. Diarrhea is non bloodly and consists of soft stool, no mucus. Not associated with bloating or cramping abdominal pain. Patient also reports she has been having frequent UTIs over the past few months, and currently reports dysuria.  Denies fevers, chills, chest pain, shortness of breath, nausea, vomiting, or hematuria    Vitals in the ED were unremarkable  Labs significant for 48k(lymphocyte predominant), Hb 10.7(baseline 11), creat 1.2 (baseline 0.9), UA positive    CTAP : There is moderate right and mild left hydronephrosis and hydroureter   to the level of the mid pelvis. No ureteral stones are identified. A 2-3   hour delayed CT scan, allowing time for contrast to extend through the   ureters, may be helpful to further delineate the site of obstruction. (No   additional contrast is necessary.)2. Splenomegaly (16 cm in length).    4B course: Pt was treated with IVF. C diff neg. Seen by GI who recommneded OP colonoscopy. Pt was also treatwed with rocephin for positive UA. Seen by urology: OP cystoscopy. UCx Neg and BCx NGTD. Stable for DC.       #Chronic Diarrhea  -HD stable, appears cachectic  -No SIRS  -4-5 episodes/ day of soft watery stools. No association with lactose intake  - CTAP w/ iv contrast: 1. There is moderate right and mild left hydronephrosis and hydroureter   to the level of the mid pelvis. No ureteral stones are identified. A 2-3   hour delayed CT scan, allowing time for contrast to extend through the   ureters, may be helpful to further delineate the site of obstruction. (No   additional contrast is necessary.)  2. Splenomegaly (16 cm in length).  3. Surgical clips and anastomosis noted in the RLQ.  -No h/o NSAID or PPI use  -Cdiff neg  -Fu GI PCR, stool cx, Bcx, ova parasites, stool osm (secretory vs osmotic diarrhea), calprotectin, fat OP   -ESR CRP vit ADEK level OP   -s/p IVF LR   - bicarb 17. started on po bicarb 650mg bid. repeat bicarb : 23. po bicarb dc'ed.   - GI Cs noted: OP colonoscopy    - loperamide prn    #KHANH on CKD II(likely prerenal)- resolved  #Hypomagnesemia-repleted  -Cr downtrending     #Dysuria  #pyuria  #Neurogenic bladder  #Chronic b/l hydro  -UA positive for WBC and LE   - UCX neg  - BCX NGTD  -CTAP: B/l hydro, no stone  -Fu repeat CT for delayed contrast clearance  -Seen by urologist Dr. bonilla in [facility] who reccomended bee  -Pt refusing bee in ED  -s/p aztreonam in ED, pt states she got a rash to ampicillin.  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 Montgomery OP   - not on any treatments per HIE        85-year-old female with PMH cervical cancer (s/p treatment 10 years ago), neurogenic bladder, arthritis, suspected CLL (follows w/ Odaimi)  presents to the ED for evaluation of intermittent diarrhea since January.  Patient states she has up to 5 episodes of loose stools per day, few episodes that wake her up at night, episodes also occur while shes fasting. Diarrhea is non bloodly and consists of soft stool, no mucus. Not associated with bloating or cramping abdominal pain. Patient also reports she has been having frequent UTIs over the past few months, and currently reports dysuria.  Denies fevers, chills, chest pain, shortness of breath, nausea, vomiting, or hematuria    Vitals in the ED were unremarkable  Labs significant for 48k(lymphocyte predominant), Hb 10.7(baseline 11), creat 1.2 (baseline 0.9), UA positive    CTAP : There is moderate right and mild left hydronephrosis and hydroureter   to the level of the mid pelvis. No ureteral stones are identified. A 2-3   hour delayed CT scan, allowing time for contrast to extend through the   ureters, may be helpful to further delineate the site of obstruction. (No   additional contrast is necessary.)2. Splenomegaly (16 cm in length).    4B course: Pt was treated with IVF. C diff neg. Seen by GI who recommneded OP colonoscopy Pt denied any inpatient interventions can treat with imodium PRN since infectious work up has been negative. Pt was also treated with rocephin for positive UA. Seen by urology: OP cystoscopy. UCx Neg and BCx NGTD. Stable for DC.       #Chronic Diarrhea  -HD stable, appears cachectic  -No SIRS  -4-5 episodes/ day of soft watery stools. No association with lactose intake  - CTAP w/ iv contrast: 1. There is moderate right and mild left hydronephrosis and hydroureter   to the level of the mid pelvis. No ureteral stones are identified. A 2-3   hour delayed CT scan, allowing time for contrast to extend through the   ureters, may be helpful to further delineate the site of obstruction. (No   additional contrast is necessary.)  2. Splenomegaly (16 cm in length).  3. Surgical clips and anastomosis noted in the RLQ.  -No h/o NSAID or PPI use  -Cdiff neg  -Fu GI PCR, stool cx, Bcx, ova parasites, stool osm (secretory vs osmotic diarrhea), calprotectin, fat OP   -ESR CRP vit ADEK level OP   -s/p IVF LR   - bicarb 17. started on po bicarb 650mg bid. repeat bicarb : 23. po bicarb dc'ed.   - GI Cs noted: OP colonoscopy    - loperamide prn    #KHANH on CKD II(likely prerenal)- resolved  #Hypomagnesemia-repleted  -Cr downtrending     #Dysuria  #pyuria  #Neurogenic bladder  #Chronic b/l hydro  -UA positive for WBC and LE   - UCX neg  - BCX NGTD  -CTAP: B/l hydro, no stone  -Fu repeat CT for delayed contrast clearance  -Seen by urologist Dr. bonilla in [facility] who reccomended bee  -Pt refusing bee in ED  -s/p aztreonam in ED, pt states she got a rash to ampicillin.  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 Montgomery OP   - not on any treatments per HIE       Discussion of discharge plan of care, including discharge diagnosis, medication reconciliation, and follow-ups, was conducted with Dr. Pope on 7/15/25 and discharge was approved. 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  Patient states she has up to 5 episodes of loose stools per day, few episodes that wake her up at night, episodes also occur while shes fasting. Diarrhea is non bloodly and consists of soft stool, no mucus. Not associated with bloating or cramping abdominal pain. Patient also reports she has been having frequent UTIs over the past few months, and currently reports dysuria.  Denies fevers, chills, chest pain, shortness of breath, nausea, vomiting, or hematuria    Vitals in the ED were unremarkable  Labs significant for 48k(lymphocyte predominant), Hb 10.7(baseline 11), creat 1.2 (baseline 0.9), UA positive    CTAP : There is moderate right and mild left hydronephrosis and hydroureter to the level of the mid pelvis. No ureteral stones are identified. A 2-3 hour delayed CT scan, allowing time for contrast to extend through the ureters, may be helpful to further delineate the site of obstruction. (No additional contrast is necessary.)2. Splenomegaly (16 cm in length).    4B course: Pt was treated with IVF. C diff neg. Seen by GI who recommneded OP colonoscopy Pt was convinced by family to undergo colonoscopy inpatient and it was completed 7/17. Can treat diarrhea with imodium PRN since infectious work up has been negative. Pt was also treated with rocephin for positive UA. Seen by urology: OP cystoscopy. UCx Neg and BCx NGTD. Stable for DC.       #Chronic Diarrhea  -HD stable, appears cachectic  -No SIRS  -4-5 episodes/ day of soft watery stools. No association with lactose intake  - CTAP w/ iv contrast: 1. There is moderate right and mild left hydronephrosis and hydroureter   to the level of the mid pelvis. No ureteral stones are identified. A 2-3   hour delayed CT scan, allowing time for contrast to extend through the   ureters, may be helpful to further delineate the site of obstruction. (No   additional contrast is necessary.)  2. Splenomegaly (16 cm in length).  3. Surgical clips and anastomosis noted in the RLQ.  -No h/o NSAID or PPI use  -Cdiff neg  -Fu GI PCR, stool cx, Bcx, ova parasites, stool osm (secretory vs osmotic diarrhea), calprotectin, fat OP   -ESR CRP vit ADEK level OP   -s/p IVF LR   - bicarb 17. started on po bicarb 650mg bid. repeat bicarb : 23. po bicarb dc'ed.   - GI Cs noted: OP colonoscopy    - loperamide prn    #KHANH on CKD II(likely prerenal)- resolved  #Hypomagnesemia-repleted  -Cr downtrending     #Dysuria  #pyuria  #Neurogenic bladder  #Chronic b/l hydro  -UA positive for WBC and LE   - UCX neg  - BCX NGTD  -CTAP: B/l hydro, no stone  -Fu repeat CT for delayed contrast clearance  -Seen by urologist Dr. bonilla in [facility] who recommended bee  -Pt refusing bee in ED  -s/p aztreonam in ED, pt states she got a rash to ampicillin.  -ID recs noted: dc rocephin  -urology recs noted:  OP cystoscopy   - c/w home gemtesa   - repeat KBUS OP    #Anemia  - iron studies noted  - dc with ferrous sulfate   -Hb around baseline     #suspected CLL   -WBC 48k prev around 40s  -Splenomegaly on CT  -Fish abnormal- 13q deletions  -Follows with dr. Montgomery OP   - not on any treatments per HIE       Discussion of discharge plan of care, including discharge diagnosis, medication reconciliation, and follow-ups, was conducted with Dr. Perez on 7/17/25 and discharge was approved.    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  Patient states she has up to 5 episodes of loose stools per day, few episodes that wake her up at night, episodes also occur while shes fasting. Diarrhea is non bloodly and consists of soft stool, no mucus. Not associated with bloating or cramping abdominal pain. Patient also reports she has been having frequent UTIs over the past few months, and currently reports dysuria.  Denies fevers, chills, chest pain, shortness of breath, nausea, vomiting, or hematuria    Vitals in the ED were unremarkable  Labs significant for 48k(lymphocyte predominant), Hb 10.7(baseline 11), creat 1.2 (baseline 0.9), UA positive    CTAP : There is moderate right and mild left hydronephrosis and hydroureter to the level of the mid pelvis. No ureteral stones are identified. A 2-3 hour delayed CT scan, allowing time for contrast to extend through the ureters, may be helpful to further delineate the site of obstruction. (No additional contrast is necessary.)2. Splenomegaly (16 cm in length).    4B course: Pt was treated with IVF. C diff neg. Seen by GI who recommneded OP colonoscopy Pt was convinced by family to undergo colonoscopy inpatient and it was completed 7/17. Can treat diarrhea with imodium PRN since infectious work up has been negative. Pt was also treated with rocephin for positive UA. Seen by urology: OP cystoscopy. UCx Neg and BCx NGTD. Stable for DC.     GI recs post colonoscopy 7/16 - - If diarrhea persists, can consider trial of cholestyramine. Dosage typically starts at 4-8 grams per day (1 packet or scoopful once or twice a day), with maintenance doses of 8-16 grams per day.   - Can also consider Imodium for symptomatic control of her diarrhea; 4mg (2 capsules), followed by 2mg (one capsule) after each unformed stool, not exceeding 16mg per day.  - Patient will need to follow up with GI outpatient to repeat her colonoscopy in 6 months, if aligns with her GOC.       #Chronic Diarrhea  -HD stable, appears cachectic  -No SIRS  -4-5 episodes/ day of soft watery stools. No association with lactose intake  - CTAP w/ iv contrast: 1. There is moderate right and mild left hydronephrosis and hydroureter   to the level of the mid pelvis. No ureteral stones are identified. A 2-3   hour delayed CT scan, allowing time for contrast to extend through the   ureters, may be helpful to further delineate the site of obstruction. (No   additional contrast is necessary.)  2. Splenomegaly (16 cm in length).  3. Surgical clips and anastomosis noted in the RLQ.  -No h/o NSAID or PPI use  -Cdiff neg  -Fu GI PCR, stool cx, Bcx, ova parasites, stool osm (secretory vs osmotic diarrhea), calprotectin, fat OP   -ESR CRP vit ADEK level OP   -s/p IVF LR   - bicarb 17. started on po bicarb 650mg bid. repeat bicarb : 23. po bicarb dc'ed.   - GI Cs noted: OP colonoscopy    - loperamide prn    #KHANH on CKD II(likely prerenal)- resolved  #Hypomagnesemia-repleted  -Cr downtrending     #Dysuria  #pyuria  #Neurogenic bladder  #Chronic b/l hydro  -UA positive for WBC and LE   - UCX neg  - BCX NGTD  -CTAP: B/l hydro, no stone  -Fu repeat CT for delayed contrast clearance  -Seen by urologist Dr. bonilal in [facility] who recommended bee  -Pt refusing bee in ED  -s/p aztreonam in ED, pt states she got a rash to ampicillin.  -ID recs noted: dc rocephin  -urology recs noted:  OP cystoscopy   - c/w home gemtesa   - repeat KBUS OP    #Anemia  - iron studies noted  - dc with ferrous sulfate   -Hb around baseline     #suspected CLL   -WBC 48k prev around 40s  -Splenomegaly on CT  -Fish abnormal- 13q deletions  -Follows with dr. Montgomery OP   - not on any treatments per HIE       Discussion of discharge plan of care, including discharge diagnosis, medication reconciliation, and follow-ups, was conducted with Dr. Perez on 7/17/25 and discharge was approved.    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  Patient states she has up to 5 episodes of loose stools per day, few episodes that wake her up at night, episodes also occur while shes fasting. Diarrhea is non bloodly and consists of soft stool, no mucus. Not associated with bloating or cramping abdominal pain. Patient also reports she has been having frequent UTIs over the past few months, and currently reports dysuria.  Denies fevers, chills, chest pain, shortness of breath, nausea, vomiting, or hematuria    Vitals in the ED were unremarkable  Labs significant for 48k(lymphocyte predominant), Hb 10.7(baseline 11), creat 1.2 (baseline 0.9), UA positive    CTAP : There is moderate right and mild left hydronephrosis and hydroureter to the level of the mid pelvis. No ureteral stones are identified. A 2-3 hour delayed CT scan, allowing time for contrast to extend through the ureters, may be helpful to further delineate the site of obstruction. (No additional contrast is necessary.)2. Splenomegaly (16 cm in length).    4B course: Pt was treated with IVF. C diff neg. Seen by GI who recommneded OP colonoscopy Pt was convinced by family to undergo colonoscopy inpatient and it was completed 7/17. Can treat diarrhea with imodium PRN since infectious work up has been negative. Pt was also treated with rocephin for positive UA. Seen by urology: OP cystoscopy. UCx Neg and BCx NGTD. Stable for DC.     GI recs post colonoscopy 7/16 - - If diarrhea persists, can consider trial of cholestyramine. Dosage typically starts at 4-8 grams per day (1 packet or scoopful once or twice a day), with maintenance doses of 8-16 grams per day.   - Can also consider Imodium for symptomatic control of her diarrhea; 4mg (2 capsules), followed by 2mg (one capsule) after each unformed stool, not exceeding 16mg per day.  - Patient will need to follow up with GI outpatient to repeat her colonoscopy in 6 months, if aligns with her GOC.       #Chronic Diarrhea  -HD stable, appears cachectic  -No SIRS  -4-5 episodes/ day of soft watery stools. No association with lactose intake  - CTAP w/ iv contrast: 1. There is moderate right and mild left hydronephrosis and hydroureter   to the level of the mid pelvis. No ureteral stones are identified. A 2-3   hour delayed CT scan, allowing time for contrast to extend through the   ureters, may be helpful to further delineate the site of obstruction. (No   additional contrast is necessary.)  2. Splenomegaly (16 cm in length).  3. Surgical clips and anastomosis noted in the RLQ.  -No h/o NSAID or PPI use  -Cdiff neg  -Fu GI PCR, stool cx, Bcx, ova parasites, stool osm (secretory vs osmotic diarrhea), calprotectin, fat OP   -ESR CRP vit ADEK level OP   -s/p IVF LR   - bicarb 17. started on po bicarb 650mg bid. repeat bicarb : 23. po bicarb dc'ed.   - GI Cs noted: OP colonoscopy    - loperamide prn    #KHANH on CKD II(likely prerenal)- resolved  #Hypomagnesemia-repleted  -Cr downtrending     #Dysuria  #pyuria  #Neurogenic bladder  #Chronic b/l hydro  -UA positive for WBC and LE   - UCX neg  - BCX NGTD  -CTAP: B/l hydro, no stone  -Fu repeat CT for delayed contrast clearance  -Seen by urologist Dr. bonilla in [facility] who recommended bee  -Pt refusing bee in ED  -s/p aztreonam in ED, pt states she got a rash to ampicillin.  -ID recs noted: dc rocephin  -urology recs noted:  OP cystoscopy   - c/w home gemtesa   - repeat KBUS OP    #Anemia  - iron studies noted  - dc with ferrous sulfate   -Hb around baseline     #suspected CLL   -WBC 48k prev around 40s  -Splenomegaly on CT  -Fish abnormal- 13q deletions  -Follows with dr. Montgomery OP   - not on any treatments per HIE       Discussion of discharge plan of care, including discharge diagnosis, medication reconciliation, and follow-ups, was conducted with Dr. Charlton on 7/18/25 and discharge was approved.    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  Patient states she has up to 5 episodes of loose stools per day, few episodes that wake her up at night, episodes also occur while shes fasting. Diarrhea is non bloodly and consists of soft stool, no mucus. Not associated with bloating or cramping abdominal pain. Patient also reports she has been having frequent UTIs over the past few months, and currently reports dysuria.  Denies fevers, chills, chest pain, shortness of breath, nausea, vomiting, or hematuria    Vitals in the ED were unremarkable  Labs significant for 48k(lymphocyte predominant), Hb 10.7(baseline 11), creat 1.2 (baseline 0.9), UA positive    CTAP : There is moderate right and mild left hydronephrosis and hydroureter to the level of the mid pelvis. No ureteral stones are identified. A 2-3 hour delayed CT scan, allowing time for contrast to extend through the ureters, may be helpful to further delineate the site of obstruction. (No additional contrast is necessary.)2. Splenomegaly (16 cm in length).    4B course: Pt was treated with IVF. C diff neg. Seen by GI who recommneded OP colonoscopy Pt was convinced by family to undergo colonoscopy inpatient and it was completed 7/17. Can treat diarrhea with imodium PRN since infectious work up has been negative. Pt was also treated with rocephin for positive UA. Seen by urology: OP cystoscopy. UCx Neg and BCx NGTD. Stable for DC.     GI recs post colonoscopy 7/16 - - If diarrhea persists, can consider trial of cholestyramine. Dosage typically starts at 4-8 grams per day (1 packet or scoopful once or twice a day), with maintenance doses of 8-16 grams per day.   - Can also consider Imodium for symptomatic control of her diarrhea; 4mg (2 capsules), followed by 2mg (one capsule) after each unformed stool, not exceeding 16mg per day.  - Patient will need to follow up with GI outpatient to repeat her colonoscopy in 6 months, if aligns with her GOC.       #Chronic Diarrhea  -HD stable, appears cachectic  -No SIRS  -4-5 episodes/ day of soft watery stools. No association with lactose intake  - CTAP w/ iv contrast: 1. There is moderate right and mild left hydronephrosis and hydroureter   to the level of the mid pelvis. No ureteral stones are identified. A 2-3   hour delayed CT scan, allowing time for contrast to extend through the   ureters, may be helpful to further delineate the site of obstruction. (No   additional contrast is necessary.)  2. Splenomegaly (16 cm in length).  3. Surgical clips and anastomosis noted in the RLQ.  -No h/o NSAID or PPI use  -Cdiff neg, GI PCR negative  s/p Colonoscopy 7/16 showed multiple polyps: Polyp (7 mm) in the rectum. Polyp (8 mm) in the sigmoid colon. Polyp (10 mm) in the colon. (Polypectomy).  GI recommended Cholestyramine or imodium for diarrhea  Follow up outpatient.     #KHANH on CKD II(likely prerenal)- resolved  #Hypomagnesemia-repleted  Cr improved.     #Dysuria  #pyuria  #Neurogenic bladder  #Chronic b/l hydro  -UA positive for WBC and LE   - UCX neg  - BCX NGTD  -CTAP: B/l hydro, no stone  -Fu repeat CT for delayed contrast clearance  -Seen by urologist Dr. bonilla in [facility] who recommended bee  -Pt refusing bee in ED  -s/p aztreonam in ED, pt states she got a rash to ampicillin.  -ID recs noted: dc rocephin  -urology recs noted:  OP cystoscopy   - c/w home gemtesa   - repeat KB[facility] OP    #Anemia  - iron studies noted  - dc with ferrous sulfate   -Hb around baseline     #suspected CLL   -WBC 48k prev around 40s  -Splenomegaly on CT  -Fish abnormal- 13q deletions  -Follows with dr. Montgomery OP   - not on any treatments per HIE       Discussion of discharge plan of care, including discharge diagnosis, medication reconciliation, and follow-ups, was conducted with Dr. Charlton on 7/18/25 and discharge was approved.

## 2025-07-11 NOTE — DISCHARGE NOTE PROVIDER - PROVIDER TOKENS
PROVIDER:[TOKEN:[[id number]],FOLLOWUP:[1 week]],PROVIDER:[TOKEN:[[id number]],FOLLOWUP:[1 week]],PROVIDER:[TOKEN:[[id number]],FOLLOWUP:[1 week]],PROVIDER:[TOKEN:[[id number]],FOLLOWUP:[1 week]]

## 2025-07-11 NOTE — DISCHARGE NOTE PROVIDER - NSDCCPCAREPLAN_GEN_ALL_CORE_FT
PRINCIPAL DISCHARGE DIAGNOSIS  Diagnosis: Non-infective diarrhea  Assessment and Plan of Treatment: You were admitted for diarrhea. You were treated with iv fluids. Stool studies were negative. Cat scan of abdomen showed no significant abnormalities but mildly enlarged kidneys. Urine studies showed possible infection and you were treated with antibiotics.   After discharge, please follow up with urology for outpatient cystoscopy and repeat kidney ultrasouns. Please follow up with GI doctor for colonoscopy and morestool studies. Please follow up with [facility]on doctor for CLL. Please follow up with primary care doctor.   Please take medications as prescribed.      SECONDARY DISCHARGE DIAGNOSES  Diagnosis: Hypomagnesemia  Assessment and Plan of Treatment:     Diagnosis: Diarrhea  Assessment and Plan of Treatment:      PRINCIPAL DISCHARGE DIAGNOSIS  Diagnosis: Non-infective diarrhea  Assessment and Plan of Treatment: You were admitted for diarrhea. You were treated with iv fluids. Stool studies were negative. Cat scan of abdomen showed no significant abnormalities but mildly enlarged kidneys. Urine studies showed possible infection and you were treated with antibiotics. During your admission, you were seen by GI and had a colonoscopy completed.  After discharge, please follow up with urology for outpatient cystoscopy and repeat kidney ultrasound. Please follow up with [facility] doctor for CLL. Please follow up with primary care doctor.   Please take your medications as prescribed and please follow up with your providers by calling them to make an appointment so that you can see them in 1-2 weeks; bring your paperwork from this hospital stay to that visit.  Seek immediate medical attention if you develop fevers, chills, chest pain, shortness of breath, nausea and vomiting, abdominal pain, passing out, weakness or numbness or tingling on one side of your body, or any other concerning signs or symptoms.      SECONDARY DISCHARGE DIAGNOSES  Diagnosis: Hypomagnesemia  Assessment and Plan of Treatment:     Diagnosis: Diarrhea  Assessment and Plan of Treatment:      PRINCIPAL DISCHARGE DIAGNOSIS  Diagnosis: Non-infective diarrhea  Assessment and Plan of Treatment: You were admitted for diarrhea. You were treated with iv fluids. Stool studies were negative. Cat scan of abdomen showed no significant abnormalities but mildly enlarged kidneys. Urine studies showed possible infection and you were treated with antibiotics. During your admission, you were seen by GI and had a colonoscopy completed.  After discharge, please follow up with urology for outpatient cystoscopy and repeat kidney ultrasound. Please follow up with [facility] doctor for CLL. Please follow up with primary care doctor.   Please take your medications as prescribed and please follow up with your providers by calling them to make an appointment so that you can see them in 1-2 weeks; bring your paperwork from this hospital stay to that visit.  Seek immediate medical attention if you develop fevers, chills, chest pain, shortness of breath, nausea and vomiting, abdominal pain, passing out, weakness or numbness or tingling on one side of your body, or any other concerning signs or symptoms.      SECONDARY DISCHARGE DIAGNOSES  Diagnosis: Hypomagnesemia  Assessment and Plan of Treatment:     Diagnosis: Diarrhea  Assessment and Plan of Treatment: You can use Imodium for symptomatic control of her diarrhea; 4mg (2 capsules), followed by 2mg (one capsule) after each unformed stool, not exceeding 16mg per day.

## 2025-07-11 NOTE — DISCHARGE NOTE PROVIDER - NSDCFUSCHEDAPPT_GEN_ALL_CORE_FT
Mohan Montgomery  [facility] PreAdmits  Scheduled Appointment: 09/05/2025    Mohan MontgomeryFormerly [facility]  [address]  Scheduled Appointment: 09/05/2025

## 2025-07-11 NOTE — PROGRESS NOTE ADULT - SUBJECTIVE AND OBJECTIVE BOX
SUBJECTIVE/OVERNIGHT EVENTS  Today is hospital day 3d. This morning patient was seen and examined at bedside, resting comfortably in bed. No acute or major events overnight.    HOSPITAL COURSE      CODE STATUS:    FAMILY COMMUNICATION  Contact date:  Name of person contacted:  Relationship to patient:  Communication details:    MEDICATIONS  STANDING MEDICATIONS  cefTRIAXone   IVPB 1000 milliGRAM(s) IV Intermittent every 24 hours  heparin   Injectable 5000 Unit(s) SubCutaneous every 8 hours  lactated ringers. 1000 milliLiter(s) IV Continuous <Continuous>  sodium bicarbonate 650 milliGRAM(s) Oral two times a day  tamsulosin 0.4 milliGRAM(s) Oral at bedtime    PRN MEDICATIONS  haloperidol    Injectable 5 milliGRAM(s) IntraMuscular once PRN  loperamide 2 milliGRAM(s) Oral two times a day PRN    VITALS  T(F): 98.4 (07-10-25 @ 23:04), Max: 99.5 (07-10-25 @ 16:35)  HR: 103 (07-10-25 @ 23:04) (68 - 103)  BP: 130/80 (07-10-25 @ 23:04) (115/66 - 130/80)  RR: 18 (07-10-25 @ 23:04) (18 - 18)  SpO2: 98% (07-10-25 @ 23:04) (97% - 98%)    PHYSICAL EXAM  GENERAL  (x  ) NAD, lying in bed comfortably     (  ) obtunded     (  ) lethargic     (  ) somnolent    HEAD  ( x ) Atraumatic     (  ) hematoma     (  ) laceration (specify location:       )     NECK  ( x ) Supple     (  ) neck stiffness     (  ) nuchal rigidity     (  )  no JVD     (  ) JVD present ( -- cm)    HEART  Rate -->  ( x ) normal rate    (  ) bradycardic    (  ) tachycardic  Rhythm -->  (  ) regular    (  ) regularly irregular    (  ) irregularly irregular  Murmurs -->  (  ) normal s1/s2    (  ) systolic murmur    (  ) diastolic murmur    (  ) continuous murmur     (  ) S3 present    (  ) S4 present    LUNGS  (x  )Unlabored respirations     (  ) tachypnea  (  ) B/L air entry     (  ) decreased breath sounds in:  (location     )    (  ) no adventitious sound     (  ) crackles     (  ) wheezing      (  ) rhonchi      (specify location:       )  (  ) chest wall tenderness (specify location:       )    ABDOMEN  (x  ) Soft     (  ) tense   |   (  ) nondistended     (  ) distended   |   (  ) +BS     (  ) hypoactive bowel sounds     (  ) hyperactive bowel sounds  (  ) nontender     (  ) RUQ tenderness     (  ) RLQ tenderness     (  ) LLQ tenderness     (  ) epigastric tenderness     (  ) diffuse tenderness  (  ) Splenomegaly      (  ) Hepatomegaly      (  ) Jaundice     (  ) ecchymosis     EXTREMITIES  (x ) Normal     (  ) Rash     (  ) ecchymosis     (  ) varicose veins      (  ) pitting edema     (  ) non-pitting edema   (  ) ulceration     (  ) gangrene:     (location:     )    NERVOUS SYSTEM  (  ) A&Ox3     ( x ) confused     (  ) lethargic  CN II-XII:     (  ) Intact     (  ) focal deficits  (Specify:     )   Upper extremities:     (  ) strength X/5     (  ) focal deficit (specify:    )  Lower extremities:     (  ) strength  X/5    (  ) focal deficit (specify:    )    SKIN  (  ) No rashes or lesions     (  ) maculopapular rash     (  ) pustules     (  ) vesicles     (  ) ulcer     (  ) ecchymosis     (specify location:     )    (  ) Indwelling Ladd Catheter   Date insterted:    Reason (  ) Critical illness     (  ) urinary retention    (  ) Accurate Ins/Outs Monitoring     (  ) CMO patient    (  ) Central Line  Date inserted:  Location: (  ) Right IJ   (  ) Left IJ   (  ) Right Fem   (  ) Left Fem    (  ) SPC  (  ) pigtail  (  ) PEG tube  (  ) colostomy  (  ) jejunostomy  (  ) U-Dall    LABS             10.7   30.55 )-----------( 102      ( 07-10-25 @ 05:48 )             34.6     138  |  109  |  19  -------------------------<  91   07-10-25 @ 05:48  3.5  |  18  |  1.2    Ca      8.3     07-10-25 @ 05:48  Mg     1.8     07-10-25 @ 05:48    TPro  4.3  /  Alb  2.9  /  TBili  0.3  /  DBili  x   /  AST  19  /  ALT  13  /  AlkPhos  54  /  GGT  x     07-10-25 @ 05:48        Urinalysis Basic - ( 10 Jul 2025 05:48 )    Color: x / Appearance: x / SG: x / pH: x  Gluc: 91 mg/dL / Ketone: x  / Bili: x / Urobili: x   Blood: x / Protein: x / Nitrite: x   Leuk Esterase: x / RBC: x / WBC x   Sq Epi: x / Non Sq Epi: x / Bacteria: x          Culture - Blood (collected 08 Jul 2025 22:30)  Source: Blood Blood  Preliminary Report (11 Jul 2025 02:02):    No growth at 48 Hours    Culture - Blood (collected 08 Jul 2025 22:30)  Source: Blood Blood  Preliminary Report (11 Jul 2025 02:02):    No growth at 48 Hours    Urinalysis with Rflx Culture (collected 08 Jul 2025 20:40)    Culture - Urine (collected 08 Jul 2025 20:40)  Source: Clean Catch None  Final Report (09 Jul 2025 22:15):    <10,000 CFU/mL Normal Urogenital Nilda    Culture - Stool (collected 08 Jul 2025 17:25)  Source: Stool Feces  Final Report (10 Jul 2025 17:49):    No enteric pathogens isolated.    (Stool culture examined for Salmonella,    Shigella, Campylobacter, Aeromonas, Plesiomonas,    Vibrio, E.coli O157 and Yersinia)      IMAGING

## 2025-07-11 NOTE — DISCHARGE NOTE PROVIDER - NSDCMRMEDTOKEN_GEN_ALL_CORE_FT
Gemtesa 75 mg oral tablet: 1 tab(s) orally once a day   Ferretts Iron 325 mg (106 mg elemental iron) oral tablet: 1 tab(s) orally once a day  Gemtesa 75 mg oral tablet: 1 tab(s) orally once a day  loperamide 2 mg oral tablet: 1 tab(s) orally 3 times a day as needed for  diarrhea   Gemtesa 75 mg oral tablet: 1 tab(s) orally once a day  loperamide 2 mg oral tablet: 1 tab(s) orally 3 times a day as needed for  diarrhea   cholestyramine 4 g/8.3 g oral powder for reconstitution: 4 gram(s) orally 2 times a day as needed for  diarrhea  Gemtesa 75 mg oral tablet: 1 tab(s) orally once a day  loperamide 2 mg oral tablet: 1 tab(s) orally 3 times a day as needed for  diarrhea  pantoprazole 40 mg oral delayed release tablet: 1 tab(s) orally once a day (before a meal)

## 2025-07-11 NOTE — CHART NOTE - NSCHARTNOTEFT_GEN_A_CORE
Medicine Attending Attestation  Patient was seen and examined with medicine team.  Nursing records reviewed. I agree with the resident/PA/NP's note including past medical history, home medications, social history, allergies, surgical history, family history, and review of system. I have reviewed relevant vitals, laboratory values, imaging studies, and microbiology.   - Above resident's note was edited by me  - Still having diarrhea but frequency greatly improves  - C diff neg, GI PCR pending.  - Can do a trial of loperamide if infectious work up neg  - Patient to follow labs/stool studies with GI and for possible colonoscopy  - dispo to [facility] vs Home; follow PT recs  - rest of A/P as per detailed housestaff note above except above modifications    Total time spent to complete patient's bedside assessment, reviewed medical chart, discussed medical plan of care with team was 45 minutes with >50% of time spent face to face with patient, discussion with patient/family, and/or coordination of care, which exclude teaching time and/or separately reported services Medicine Attending Attestation  Patient was seen and examined with medicine team.  Nursing records reviewed. I agree with the resident/PA/NP's note including past medical history, home medications, social history, allergies, surgical history, family history, and review of system. I have reviewed relevant vitals, laboratory values, imaging studies, and microbiology.   - Above resident's note was edited by me  - Still having diarrhea but frequency greatly improves  - C diff neg, GI PCR pending.  - Can do a trial of loperamide if infectious work up neg  - Patient to follow labs/stool studies with GI and for possible EGD/colonoscopy  - dispo to [facility] vs Home; follow PT recs  - rest of A/P as per detailed housestaff note above except above modifications    Total time spent to complete patient's bedside assessment, reviewed medical chart, discussed medical plan of care with team was 45 minutes with >50% of time spent face to face with patient, discussion with patient/family, and/or coordination of care, which exclude teaching time and/or separately reported services

## 2025-07-11 NOTE — PROGRESS NOTE ADULT - SUBJECTIVE AND OBJECTIVE BOX
Chief Complaint:  Patient is a 85y old  Female who presents with a chief complaint of diarrhea (11 Jul 2025 09:07)    HPI/ 24 hr events: Patient seen and examined at bedside. Pt feeling well this morning. Tolerating diet. BM. Denies nausea, vomiting, diarrhea, abdominal pain, hematemesis, hematochezia, melena. Vitals are overall stable, no leukocytosis, Hgb 10.6 , LFTs stable.       REVIEW OF SYSTEMS:   Negative except for HPI    MEDICATIONS:   MEDICATIONS  (STANDING):  cefTRIAXone   IVPB 1000 milliGRAM(s) IV Intermittent every 24 hours  heparin   Injectable 5000 Unit(s) SubCutaneous every 8 hours  magnesium sulfate  IVPB 2 Gram(s) IV Intermittent once  tamsulosin 0.4 milliGRAM(s) Oral at bedtime    MEDICATIONS  (PRN):  haloperidol    Injectable 5 milliGRAM(s) IntraMuscular once PRN agitation    DIET:  Diet, Regular (07-09-25 @ 01:02) [Active]      ALLERGIES:   Allergies    ampicillin (Rash)  levofloxacin (Rash)    Intolerances    VITAL SIGNS:   Vital Signs Last 24 Hrs  T(C): 36.9 (10 Jul 2025 23:04), Max: 37.5 (10 Jul 2025 16:35)  T(F): 98.4 (10 Jul 2025 23:04), Max: 99.5 (10 Jul 2025 16:35)  HR: 103 (10 Jul 2025 23:04) (68 - 103)  BP: 130/80 (10 Jul 2025 23:04) (115/66 - 130/80)  BP(mean): --  RR: 18 (10 Jul 2025 23:04) (18 - 18)  SpO2: 98% (10 Jul 2025 23:04) (97% - 98%)    Parameters below as of 10 Jul 2025 23:04  Patient On (Oxygen Delivery Method): room air      I&O's Summary    10 Jul 2025 07:01  -  11 Jul 2025 07:00  --------------------------------------------------------  IN: 0 mL / OUT: 500 mL / NET: -500 mL      PHYSICAL EXAM:   GENERAL:  No acute distress  HEENT:  NC/AT, conjunctiva clear, sclera anicteric  CHEST:  No increased effort  HEART:  Regular rate  ABDOMEN:  Soft, non-tender, non-distended, normoactive bowel sounds, no rebound or guarding  EXTREMITIES: No edema  SKIN:  Warm, dry  NEURO:  Calm, cooperative    LABS:                        10.6   28.30 )-----------( 98       ( 11 Jul 2025 06:12 )             33.1     Hemoglobin: 10.6 g/dL (07-11-25 @ 06:12)  Hemoglobin: 10.7 g/dL (07-10-25 @ 05:48)  Hemoglobin: 11.4 g/dL (07-09-25 @ 18:19)  Hemoglobin: 10.6 g/dL (07-08-25 @ 14:55)    07-11    142  |  110  |  18  ----------------------------<  85  3.9   |  23  |  1.0    Ca    8.4      11 Jul 2025 06:12  Mg     1.7     07-11    TPro  4.3[L]  /  Alb  2.9[L]  /  TBili  0.3  /  DBili  x   /  AST  19  /  ALT  13  /  AlkPhos  54  07-10    LIVER FUNCTIONS - ( 10 Jul 2025 05:48 )  Alb: 2.9 g/dL / Pro: 4.3 g/dL / ALK PHOS: 54 U/L / ALT: 13 U/L / AST: 19 U/L / GGT: x             Culture - Blood (collected 08 Jul 2025 22:30)  Source: Blood Blood  Preliminary Report (11 Jul 2025 02:02):    No growth at 48 Hours    Culture - Blood (collected 08 Jul 2025 22:30)  Source: Blood Blood  Preliminary Report (11 Jul 2025 02:02):    No growth at 48 Hours    Urinalysis with Rflx Culture (collected 08 Jul 2025 20:40)    Culture - Urine (collected 08 Jul 2025 20:40)  Source: Clean Catch None  Final Report (09 Jul 2025 22:15):    <10,000 CFU/mL Normal Urogenital Nilda    Culture - Stool (collected 08 Jul 2025 17:25)  Source: Stool Feces  Final Report (10 Jul 2025 17:49):    No enteric pathogens isolated.    (Stool culture examined for Salmonella,    Shigella, Campylobacter, Aeromonas, Plesiomonas,    Vibrio, E.coli O157 and Yersinia)      C-Reactive Protein: 15.3 mg/L (07-11-25 @ 06:12)        Clostridium difficile GDH Interpretation: Negative for toxigenic C. Difficile.  This specimen is negative for C.  Difficile glutamate dehydrogenase (GDH) antigen and negative for C.  Difficile Toxins A & B, by EIA.  GDH is a highly sensitive screening  marker for C. Difficile that is produced in large amounts by all C.  Difficile strains, both toxigenic and nontoxigenic.  This assay has not  been validated as a test of cure.  Repeat testing during the same episode  of diarrhea is of limited value and is discouraged.  The results of this  assay should always be interpreted in conjunction with patient's clinical  history. (07-08-25 @ 17:22)      RADIOLOGY & ADDITIONAL STUDIES:      ACC: [id number] EXAM:  CT ABDOMEN AND PELVIS IC   ORDERED BY: MARGARITA TALBERT     PROCEDURE DATE:  07/08/2025          INTERPRETATION:  CLINICAL INFORMATION: Abdominal pain. Diarrhea. WBC   48.57    PMHx of suspected CLL. Post cervical cancer s/p radiation >10   years ago.    COMPARISON: CT scan of the abdomen and pelvis dated 1/16/2025    CONTRAST/COMPLICATIONS:  IV Contrast:   The patient received 95 ml of Omnipaque 350 with 5 ml   discarded.  Oral Contrast:  None  Complications:  None reported at time of study completion    PROCEDURE:  CT of the Abdomen and Pelvis was performed.  Sagittal and coronal reformats were performed.    FINDINGS:    TUBES AND LINES: None.  LOWER CHEST: There are mild reticular opacities at the lung bases. No   pleural or pericardial effusion.    LIVER: The liver is normal in size with no evidence of solid mass. The   portal vein is patent.  BILE DUCTS: Normal caliber.  GALLBLADDER: Post cholecystectomy    SPLEEN: Splenomegaly (16 cm in length). Splenic hypodensity too small for   further evaluation.  PANCREAS: The pancreas is mildly atrophic. No evidence of mass or   pancreatitis.  ADRENALS: Within normal limits.  KIDNEYS/URETERS: There is symmetric renal enhancement. There is moderate   right and mild left hydronephrosis and hydroureter to the level of the   mid pelvis. No ureteral stones are identified.    BLADDER: Bladder wall enhancement is again noted.  REPRODUCTIVE ORGANS: Post hysterectomy    BOWEL: Surgical clips and anastomosis noted in the RLQ. No evidence of   bowel obstruction, colitis, or inflammatory process. Normal caliber   appendix.  PERITONEUM/RETROPERITONEUM: No ascites. No free intraperitoneal air.  VESSELS: Aortoiliac calcifications are noted with no evidence of   abdominal aortic aneurysm. 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  LYMPH NODES: No abdominal or pelvic adenopathy.  BONES: Degenerative changes of the spine.  ABDOMINAL WALL: Unremarkable    IMPRESSION:    1. There is moderate right and mild left hydronephrosis and hydroureter   to the level of the mid pelvis. No ureteral stones are identified. A 2-3   hour delayed CT scan, allowing time for contrast to extend through the   ureters, may be helpful to further delineate the site of obstruction. (No   additional contrast is necessary.)    2. Splenomegaly (16 cm in length).    3. Surgical clips and anastomosis noted in the RLQ.    --- End of Report ---         Chief Complaint:  Patient is a 85y old  Female who presents with a chief complaint of diarrhea (11 Jul 2025 09:07)    HPI/ 24 hr events: Patient seen and examined at bedside. Pt feeling well this morning. Tolerating diet. BM this morning. Diarrhea improved. Endorses pelvic pain and burning sensation while urinating. Denies nausea, vomiting, diarrhea,, hematemesis, hematochezia, melena. Vitals are overall stable, no leukocytosis, Hgb 10.6 , LFTs stable.       REVIEW OF SYSTEMS:   Negative except for HPI    MEDICATIONS:   MEDICATIONS  (STANDING):  cefTRIAXone   IVPB 1000 milliGRAM(s) IV Intermittent every 24 hours  heparin   Injectable 5000 Unit(s) SubCutaneous every 8 hours  magnesium sulfate  IVPB 2 Gram(s) IV Intermittent once  tamsulosin 0.4 milliGRAM(s) Oral at bedtime    MEDICATIONS  (PRN):  haloperidol    Injectable 5 milliGRAM(s) IntraMuscular once PRN agitation    DIET:  Diet, Regular (07-09-25 @ 01:02) [Active]      ALLERGIES:   Allergies    ampicillin (Rash)  levofloxacin (Rash)    Intolerances    VITAL SIGNS:   Vital Signs Last 24 Hrs  T(C): 36.9 (10 Jul 2025 23:04), Max: 37.5 (10 Jul 2025 16:35)  T(F): 98.4 (10 Jul 2025 23:04), Max: 99.5 (10 Jul 2025 16:35)  HR: 103 (10 Jul 2025 23:04) (68 - 103)  BP: 130/80 (10 Jul 2025 23:04) (115/66 - 130/80)  BP(mean): --  RR: 18 (10 Jul 2025 23:04) (18 - 18)  SpO2: 98% (10 Jul 2025 23:04) (97% - 98%)    Parameters below as of 10 Jul 2025 23:04  Patient On (Oxygen Delivery Method): room air      I&O's Summary    10 Jul 2025 07:01  -  11 Jul 2025 07:00  --------------------------------------------------------  IN: 0 mL / OUT: 500 mL / NET: -500 mL      PHYSICAL EXAM:   GENERAL:  No acute distress  HEENT:  NC/AT, conjunctiva clear, sclera anicteric  CHEST:  No increased effort  HEART:  Regular rate  ABDOMEN:  Soft, non-tender, non-distended, normoactive bowel sounds, no rebound or guarding  EXTREMITIES: No edema  SKIN:  Warm, dry  NEURO:  Calm, cooperative    LABS:                        10.6   28.30 )-----------( 98       ( 11 Jul 2025 06:12 )             33.1     Hemoglobin: 10.6 g/dL (07-11-25 @ 06:12)  Hemoglobin: 10.7 g/dL (07-10-25 @ 05:48)  Hemoglobin: 11.4 g/dL (07-09-25 @ 18:19)  Hemoglobin: 10.6 g/dL (07-08-25 @ 14:55)    07-11    142  |  110  |  18  ----------------------------<  85  3.9   |  23  |  1.0    Ca    8.4      11 Jul 2025 06:12  Mg     1.7     07-11    TPro  4.3[L]  /  Alb  2.9[L]  /  TBili  0.3  /  DBili  x   /  AST  19  /  ALT  13  /  AlkPhos  54  07-10    LIVER FUNCTIONS - ( 10 Jul 2025 05:48 )  Alb: 2.9 g/dL / Pro: 4.3 g/dL / ALK PHOS: 54 U/L / ALT: 13 U/L / AST: 19 U/L / GGT: x             Culture - Blood (collected 08 Jul 2025 22:30)  Source: Blood Blood  Preliminary Report (11 Jul 2025 02:02):    No growth at 48 Hours    Culture - Blood (collected 08 Jul 2025 22:30)  Source: Blood Blood  Preliminary Report (11 Jul 2025 02:02):    No growth at 48 Hours    Urinalysis with Rflx Culture (collected 08 Jul 2025 20:40)    Culture - Urine (collected 08 Jul 2025 20:40)  Source: Clean Catch None  Final Report (09 Jul 2025 22:15):    <10,000 CFU/mL Normal Urogenital Nilda    Culture - Stool (collected 08 Jul 2025 17:25)  Source: Stool Feces  Final Report (10 Jul 2025 17:49):    No enteric pathogens isolated.    (Stool culture examined for Salmonella,    Shigella, Campylobacter, Aeromonas, Plesiomonas,    Vibrio, E.coli O157 and Yersinia)      C-Reactive Protein: 15.3 mg/L (07-11-25 @ 06:12)        Clostridium difficile GDH Interpretation: Negative for toxigenic C. Difficile.  This specimen is negative for C.  Difficile glutamate dehydrogenase (GDH) antigen and negative for C.  Difficile Toxins A & B, by EIA.  GDH is a highly sensitive screening  marker for C. Difficile that is produced in large amounts by all C.  Difficile strains, both toxigenic and nontoxigenic.  This assay has not  been validated as a test of cure.  Repeat testing during the same episode  of diarrhea is of limited value and is discouraged.  The results of this  assay should always be interpreted in conjunction with patient's clinical  history. (07-08-25 @ 17:22)      RADIOLOGY & ADDITIONAL STUDIES:      ACC: [id number] EXAM:  CT ABDOMEN AND PELVIS IC   ORDERED BY: MARGARITA TALBERT     PROCEDURE DATE:  07/08/2025          INTERPRETATION:  CLINICAL INFORMATION: Abdominal pain. Diarrhea. WBC   48.57    PMHx of suspected CLL. Post cervical cancer s/p radiation >10   years ago.    COMPARISON: CT scan of the abdomen and pelvis dated 1/16/2025    CONTRAST/COMPLICATIONS:  IV Contrast:   The patient received 95 ml of Omnipaque 350 with 5 ml   discarded.  Oral Contrast:  None  Complications:  None reported at time of study completion    PROCEDURE:  CT of the Abdomen and Pelvis was performed.  Sagittal and coronal reformats were performed.    FINDINGS:    TUBES AND LINES: None.  LOWER CHEST: There are mild reticular opacities at the lung bases. No   pleural or pericardial effusion.    LIVER: The liver is normal in size with no evidence of solid mass. The   portal vein is patent.  BILE DUCTS: Normal caliber.  GALLBLADDER: Post cholecystectomy    SPLEEN: Splenomegaly (16 cm in length). Splenic hypodensity too small for   further evaluation.  PANCREAS: The pancreas is mildly atrophic. No evidence of mass or   pancreatitis.  ADRENALS: Within normal limits.  KIDNEYS/URETERS: There is symmetric renal enhancement. There is moderate   right and mild left hydronephrosis and hydroureter to the level of the   mid pelvis. No ureteral stones are identified.    BLADDER: Bladder wall enhancement is again noted.  REPRODUCTIVE ORGANS: Post hysterectomy    BOWEL: Surgical clips and anastomosis noted in the RLQ. No evidence of   bowel obstruction, colitis, or inflammatory process. Normal caliber   appendix.  PERITONEUM/RETROPERITONEUM: No ascites. No free intraperitoneal air.  VESSELS: Aortoiliac calcifications are noted with no evidence of   abdominal aortic aneurysm. 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  LYMPH NODES: No abdominal or pelvic adenopathy.  BONES: Degenerative changes of the spine.  ABDOMINAL WALL: Unremarkable    IMPRESSION:    1. There is moderate right and mild left hydronephrosis and hydroureter   to the level of the mid pelvis. No ureteral stones are identified. A 2-3   hour delayed CT scan, allowing time for contrast to extend through the   ureters, may be helpful to further delineate the site of obstruction. (No   additional contrast is necessary.)    2. Splenomegaly (16 cm in length).    3. Surgical clips and anastomosis noted in the RLQ.    --- End of Report ---

## 2025-07-11 NOTE — PROVIDER CONTACT NOTE (CHANGE IN STATUS NOTIFICATION) - SITUATION
Patient admitted for multiple episodes of diarrhea. Pt had new onset incidence of hematuria, pt otherwise asymptomatic. MD notified and made aware. STAT CBC ordered. Current plan of care ongoing.

## 2025-07-11 NOTE — DISCHARGE NOTE PROVIDER - CARE PROVIDERS DIRECT ADDRESSES
[email or web address] [email or web address]

## 2025-07-11 NOTE — PROGRESS NOTE ADULT - ASSESSMENT
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 GI is consulted for chronic diarrhea and evaluation colonoscopy to rule out collagenous colitis.     #Chronic diarrhea  #Fecal incontinence  #Tenesmus  #History of cholecystectomy  #Surgical clips and bowel anastomosis in RLQ on CT abdomen   #Mild Pancreatic atrophy on CT abdomen  - Differentials for patient's chronic diarrhea include infectious diarrhea, postcholecystectomy bile acid diarrhea, malabsorption disorder, thyroid problems, chronic pancreatic insufficiency. Fecal incontinence could be secondary to pelvic floor problems s/p hysterectomy.  - Patient reports diarrhea for 20 years. On average, she had 4 BMs per day. Her stools are soft and pudding like with some days of liquid watery stool. It has been happening continuous without any episode of constipation or normal bowel movement.  - Reports not receiving chemotherapy or radiation therapy for cervical cancer.   - Reports streaks of blood on the stool likely because she has history of hemorrhoids.   - Reports tenesmus, fecal incontinence, and nocturnal diarrhea.   - Reports weight loss of around 158 lbs in 8 years.  - No family history of celiac disease, IBD, or colorectal cancer.   - No hx of colonoscopy per patient.   - EGD 2 years ago per patient for heart burn which was unremarkable.   - Leukocytosis improving 30.5k (lymphocyte predominant), Hb 10.7 (baseline 11), creat 1.2 (baseline 0.9), UA positive.   - CTAP showed surgical clips and anastomosis in RLQ, moderate right and mild left hydronephrosis and hydroureter to the level of the mid pelvis, splenomegaly up to 16 cm in length, mild pancreatic atrophy.   - C diff is negative. RVP negative.     RECOMMENDATIONS:  - Recommend sending TSH, fecal elastase, fecal leukocytes, fecal calprotectin, fecal fat test, and Anti-TTG IgA.   - Will follow on GI PCR, ova and parasites, stool culture, and stool osmolarity.   - Once infectious etiologies are ruled out, can start patient on imodium.  - Patient would also benefit from cholestyramine as she can likely have bile acid diarrhea after cholecystectomy.  - Recommend outpatient colonoscopy and EGD for chronic diarrhea once infectious etiologies rule out.     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 Patient follows with Dr. Montgomery.   - No hx of colonoscopy per patient.   - Reports weight loss of around 158 lbs in 8 years.  - No family history of celiac disease, IBD, or colorectal cancer.     RECOMMENDATIONS:  - Recommend outpatient colonoscopy and EGD for chronic diarrhea once infectious etiologies rule out.   - Send iron panel    Ms Lombardi is an 85-year-old female with PMH of cervical cancer (s/p hysterectomy 10 years ago), neurogenic bladder, arthritis, suspected CLL (follows w/ Dr Montgomery) who presented to the ED for evaluation of intermittent diarrhea since January. GI is consulted for chronic diarrhea and evaluation colonoscopy to rule out collagenous colitis.     #Chronic diarrhea  #Fecal incontinence  #Tenesmus  #History of cholecystectomy  #Surgical clips and bowel anastomosis in RLQ on CT abdomen   #Mild Pancreatic atrophy on CT abdomen  - Differentials for patient's chronic diarrhea include infectious diarrhea, postcholecystectomy bile acid diarrhea, malabsorption disorder, thyroid problems, chronic pancreatic insufficiency. Fecal incontinence could be secondary to pelvic floor problems s/p hysterectomy.  - Patient reports diarrhea for 20 years. On average, she had 4 BMs per day. Her stools are soft and pudding like with some days of liquid watery stool. It has been happening continuous without any episode of constipation or normal bowel movement.  - Reports not receiving chemotherapy or radiation therapy for cervical cancer.   - Reports streaks of blood on the stool likely because she has history of hemorrhoids.   - Reports tenesmus, fecal incontinence, and nocturnal diarrhea.   - Reports weight loss of around 158 lbs in 8 years.  - No family history of celiac disease, IBD, or colorectal cancer.   - No hx of colonoscopy per patient.   - EGD 2 years ago per patient for heart burn which was unremarkable.   - Leukocytosis improving 30.5k (lymphocyte predominant), Hb 10.7 (baseline 11), creat 1.2 (baseline 0.9), UA positive.   - CTAP showed surgical clips and anastomosis in RLQ, moderate right and mild left hydronephrosis and hydroureter to the level of the mid pelvis, splenomegaly up to 16 cm in length, mild pancreatic atrophy.   - C diff is negative. RVP negative. Stool culture negative.     RECOMMENDATIONS:  - Recommend sending TSH, fecal elastase, fecal leukocytes, fecal calprotectin, fecal fat test, and Anti-TTG IgA.   - Will follow on GI PCR, ova and parasites, and stool osmolarity.   - Once infectious etiologies are ruled out, can start patient on imodium.  - Patient would also benefit from cholestyramine as she can likely have bile acid diarrhea after cholecystectomy.  - Recommend outpatient colonoscopy and EGD for chronic diarrhea once infectious etiologies rule out.     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 Patient follows with Dr. Montgomery.   - No hx of colonoscopy per patient.   - Reports weight loss of around 158 lbs in 8 years.  - No family history of celiac disease, IBD, or colorectal cancer.     RECOMMENDATIONS:  - Recommend outpatient colonoscopy and EGD for chronic diarrhea once infectious etiologies rule out.   - Send iron panel    Ms Lombardi is an 85-year-old female with PMH of cervical cancer (s/p hysterectomy 10 years ago), neurogenic bladder, arthritis, suspected CLL (follows w/ Dr Montgomery) who presented to the ED for evaluation of intermittent diarrhea since January. GI is consulted for chronic diarrhea and evaluation colonoscopy to rule out collagenous colitis.     #Chronic diarrhea (resolved)   #History of cholecystectomy  #Surgical clips and bowel anastomosis in RLQ on CT abdomen   #Mild Pancreatic atrophy on CT abdomen  - Differentials for patient's chronic diarrhea include infectious diarrhea, postcholecystectomy bile acid diarrhea, malabsorption disorder, thyroid problems, chronic pancreatic insufficiency. Fecal incontinence could be secondary to pelvic floor problems s/p hysterectomy.  - Patient reports diarrhea for 20 years. On average, she had 4 BMs per day. Her stools are soft and pudding like with some days of liquid watery stool. It has been happening continuous without any episode of constipation or normal bowel movement.  - Reports not receiving chemotherapy or radiation therapy for cervical cancer.   - Reports weight loss of around 158 lbs in 8 years.  - No family history of celiac disease, IBD, or colorectal cancer.   - No hx of colonoscopy per patient.   - EGD 2 years ago per patient for heart burn which was unremarkable.   - Leukocytosis improving 30.5k (lymphocyte predominant), Hb 10.7 (baseline 11), creat 1.2 (baseline 0.9), UA positive.   - CTAP showed surgical clips and anastomosis in RLQ, moderate right and mild left hydronephrosis and hydroureter to the level of the mid pelvis, splenomegaly up to 16 cm in length, mild pancreatic atrophy.   - C diff is negative. RVP negative. Stool culture negative.   - 7/11: improvement in diarrhea, reports soft BM today with no blood.     RECOMMENDATIONS:  - Recommend sending TSH, fecal elastase, fecal leukocytes, fecal calprotectin, fecal fat test, and Anti-TTG IgA.   - Will follow on GI PCR, ova and parasites, and stool osmolarity.   - Recommend outpatient colonoscopy and EGD if patient continues to have diarrhea. Currently reports resolution of diarrhea.     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 Patient follows with Dr. Montgomery.   - No hx of colonoscopy per patient.   - Reports weight loss of around 158 lbs in 8 years.  - No family history of celiac disease, IBD, or colorectal cancer.     RECOMMENDATIONS:  - Recommend outpatient colonoscopy and EGD if patient continues to have diarrhea. Currently reports resolution of diarrhea.   - Send iron panel    Ms Lombardi is an 85-year-old female with PMH of cervical cancer (s/p hysterectomy 10 years ago), neurogenic bladder, arthritis, suspected CLL (follows w/ Dr Montgomery) who presented to the ED for evaluation of intermittent diarrhea since January. GI is consulted for chronic diarrhea and evaluation colonoscopy to rule out collagenous colitis.     #Chronic diarrhea (resolved)   #History of cholecystectomy  #Surgical clips and bowel anastomosis in RLQ on CT abdomen   #Mild Pancreatic atrophy on CT abdomen  - Differentials for patient's chronic diarrhea include infectious diarrhea, postcholecystectomy bile acid diarrhea, malabsorption disorder, thyroid problems, chronic pancreatic insufficiency. Fecal incontinence could be secondary to pelvic floor problems s/p hysterectomy.  - No hx of colonoscopy per patient.   - EGD 2 years ago per patient for heart burn which was unremarkable.   - Leukocytosis improving 30.5k (lymphocyte predominant), Hb 10.7 (baseline 11), creat 1.2 (baseline 0.9), UA positive.   - CTAP showed surgical clips and anastomosis in RLQ, moderate right and mild left hydronephrosis and hydroureter to the level of the mid pelvis, splenomegaly up to 16 cm in length, mild pancreatic atrophy.   - C diff is negative. RVP negative. Stool culture negative.   - 7/11: improvement in diarrhea, reports soft BM today with no blood.     RECOMMENDATIONS:  - Recommend sending TSH, fecal elastase, fecal leukocytes, fecal calprotectin, fecal fat test, and Anti-TTG IgA.   - Will follow on GI PCR, ova and parasites, and stool osmolarity.   - Recommend outpatient colonoscopy and EGD if patient continues to have diarrhea. Currently reports resolution of diarrhea.   - 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 Patient follows with Dr. Montgomery.   No overt GI bleeding   HD stable   HGB stable     RECOMMENDATIONS:  - Follow-up with our [facility] [phone] at [address] for further workup if deemed necessary    Ms Lombardi is an 85-year-old female with PMH of cervical cancer (s/p hysterectomy 10 years ago), neurogenic bladder, arthritis, suspected CLL (follows w/ Dr Montgomery) who presented to the ED for evaluation of intermittent diarrhea since January. GI is consulted for chronic diarrhea and evaluation colonoscopy to rule out collagenous colitis.     #Chronic diarrhea (resolved)   #History of cholecystectomy  #Surgical clips and bowel anastomosis in RLQ on CT abdomen   #Mild Pancreatic atrophy on CT abdomen  - Differentials for patient's chronic diarrhea include infectious diarrhea, postcholecystectomy bile acid diarrhea, malabsorption disorder, thyroid problems, chronic pancreatic insufficiency. Fecal incontinence could be secondary to pelvic floor problems s/p hysterectomy.  - No hx of colonoscopy per patient.   - EGD 2 years ago per patient for heart burn which was unremarkable.   - Leukocytosis improving 30.5k (lymphocyte predominant), Hb 10.7 (baseline 11), creat 1.2 (baseline 0.9), UA positive.   - CTAP showed surgical clips and anastomosis in RLQ, moderate right and mild left hydronephrosis and hydroureter to the level of the mid pelvis, splenomegaly up to 16 cm in length, mild pancreatic atrophy.   - C diff is negative. RVP negative. Stool culture negative.   - 7/11: improvement in diarrhea, reports soft BM today with no blood.     RECOMMENDATIONS:  - Recommend sending TSH, fecal elastase, fecal leukocytes, fecal calprotectin, fecal fat test, and Anti-TTG IgA.   - Follow on GI PCR, ova and parasites, and stool osmolarity.   - Recommend outpatient colonoscopy and EGD if patient continues to have diarrhea. Currently reports resolution of diarrhea.   - [phone] if deemed [facility].     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 Patient follows with Dr. Montgomery.   No overt GI bleeding   HD stable   HGB stable     RECOMMENDATIONS:  - Follow-up with our [facility] [phone] at [address] for further workup if deemed necessary.    GI will sign off. Please call GI if there is any new GI concern.

## 2025-07-11 NOTE — DISCHARGE NOTE PROVIDER - ATTENDING DISCHARGE PHYSICAL EXAMINATION:
PHYSICAL EXAM:  GENERAL: NAD, well-developed.  HEAD:  Atraumatic, Normocephalic.  EYES: EOMI, PERRLA, conjunctiva and sclera clear.  NECK: Supple, No JVD.  CHEST/LUNG: Clear to auscultation bilaterally; No wheeze.  HEART: Regular rate and rhythm; S1 S2.   ABDOMEN: Soft, Nontender, Nondistended; Bowel sounds present.  EXTREMITIES:  2+ Peripheral Pulses, improved leg edema  PSYCH: AAOx3.

## 2025-07-11 NOTE — DISCHARGE NOTE PROVIDER - CARE PROVIDER_API CALL
Mohan Montgomery  Medical Oncology  [address] [place] [address]  Phone: [phone]  Fax: [phone]  Follow Up Time: 1 week    Porfirio Ortiz  Internal Medicine  [address] [place] [address]  Phone: [phone]  Fax: [phone]  Follow Up Time: 1 week    Erik Alvarez  Gastroenterology  [address] [place] [address]  Phone: [phone]  Fax: [phone]  Follow Up Time: 1 week    Tomy Salcido  Urology  [address] [place] [address]  Phone: [phone]  Fax: [phone]  Follow Up Time: 1 week

## 2025-07-12 LAB
CULTURE RESULTS: SIGNIFICANT CHANGE UP
GI PCR PANEL: SIGNIFICANT CHANGE UP
NIGHT BLUE STAIN TISS: SIGNIFICANT CHANGE UP
SPECIMEN SOURCE: SIGNIFICANT CHANGE UP
SPECIMEN SOURCE: SIGNIFICANT CHANGE UP
TTG IGA SER-ACNC: <0.5 U/ML — SIGNIFICANT CHANGE UP
TTG IGA SER-ACNC: NEGATIVE — SIGNIFICANT CHANGE UP
TTG IGG SER IA-ACNC: NEGATIVE — SIGNIFICANT CHANGE UP
TTG IGG SER-ACNC: <0.8 U/ML — SIGNIFICANT CHANGE UP

## 2025-07-12 PROCEDURE — 99232 SBSQ HOSP IP/OBS MODERATE 35: CPT

## 2025-07-12 RX ORDER — LOPERAMIDE HCL 1 MG/7.5ML
2 SOLUTION ORAL
Refills: 0 | Status: DISCONTINUED | OUTPATIENT
Start: 2025-07-12 | End: 2025-07-18

## 2025-07-12 RX ADMIN — TAMSULOSIN HYDROCHLORIDE 0.4 MILLIGRAM(S): 0.4 CAPSULE ORAL at 21:33

## 2025-07-12 NOTE — PROGRESS NOTE ADULT - SUBJECTIVE AND OBJECTIVE BOX
MEDICINE ATTENDING PROGRESS NOTE  85-year-old female with PMH cervical cancer (s/p treatment 10 years ago), neurogenic bladder, arthritis, suspected CLL (follows w/ Odaimi)  presents to the ED for evaluation of intermittent diarrhea since January. Admitted for further management    Interval/Overnight Events  - Reports loose stool; about 1-2 episode per day  - GI PCR and C diff neg -> will do a trial of loperamide  - Patient to follow stool studies with GI; probably need EGD/Colonoscopy  - dispo planning to [facility]  General: Denies fevers, chills, nightsweats, weight loss  HEENT: Denies headache, rhinorrhea, sore throat, eye pain  CV: Denies CP, palpitations  PULM: Denies SOB, cough  GI: Denies abdominal pain, diarrhea  : Denies dysuria, hematuria  MSK: Denies arthralgias  SKIN: Denies rash   NEURO: Denies paresthesias, weakness  PSYCH: Denies depression    MEDICATIONS  (STANDING):  heparin   Injectable 5000 Unit(s) SubCutaneous every 8 hours  tamsulosin 0.4 milliGRAM(s) Oral at bedtime    MEDICATIONS  (PRN):  haloperidol    Injectable 5 milliGRAM(s) IntraMuscular once PRN agitation  loperamide 2 milliGRAM(s) Oral four times a day PRN Diarrhea    ANTIBIOTICS:      VITALS:  T(F): 97.5, Max: 97.8 (07-11-25 @ 16:31)  HR: 58  BP: 125/75  RR: 18Vital Signs Last 24 Hrs  T(C): 36.4 (12 Jul 2025 07:00), Max: 36.6 (11 Jul 2025 16:31)  T(F): 97.5 (12 Jul 2025 07:00), Max: 97.8 (11 Jul 2025 16:31)  HR: 58 (12 Jul 2025 07:00) (58 - 83)  BP: 125/75 (12 Jul 2025 07:00) (122/61 - 147/64)  BP(mean): --  RR: 18 (12 Jul 2025 07:00) (18 - 19)  SpO2: 95% (12 Jul 2025 07:00) (95% - 96%)    Parameters below as of 12 Jul 2025 07:00  Patient On (Oxygen Delivery Method): room air     I&O's Summary    PHYSICAL EXAM:  Gen: NAD, resting in bed  HEENT: Normocephalic, atraumatic  Neck: supple, no lymphadenopathy  CV: Regular rate & regular rhythm  Lungs: CTABL no wheeze  Abdomen: Soft, NTND+ BS present  Ext: Warm, well perfused no CCE  Neuro: non focal, awake, CN II-XII intact   Skin: no rash, no erythema  Psych: no SI, HI, Hallucination     LABS:                        11.0   39.58 )-----------( 119      ( 11 Jul 2025 19:45 )             34.9     07-11    142  |  110  |  18  ----------------------------<  85  3.9   |  23  |  1.0    Ca    8.4      11 Jul 2025 06:12  Mg     1.7     07-11      MICROBIOLOGY:  Urinalysis Basic - ( 11 Jul 2025 06:12 )  Color: x / Appearance: x / SG: x / pH: x  Gluc: 85 mg/dL / Ketone: x  / Bili: x / Urobili: x   Blood: x / Protein: x / Nitrite: x   Leuk Esterase: x / RBC: x / WBC x   Sq Epi: x / Non Sq Epi: x / Bacteria: x      Culture - Acid Fast - Stool w/Smear (collected 07-11-25 @ 10:05)  Source: Stool Stool    Culture - Blood (collected 07-08-25 @ 22:30)  Source: Blood Blood  Preliminary Report (07-12-25 @ 02:02):    No growth at 72 Hours    Culture - Blood (collected 07-08-25 @ 22:30)  Source: Blood Blood  Preliminary Report (07-12-25 @ 02:02):    No growth at 72 Hours    Urinalysis with Rflx Culture (collected 07-08-25 @ 20:40)    Culture - Urine (collected 07-08-25 @ 20:40)  Source: Clean Catch None  Final Report (07-09-25 @ 22:15):    <10,000 CFU/mL Normal Urogenital Nilda    Culture - Stool (collected 07-08-25 @ 17:25)  Source: Stool Feces  Final Report (07-10-25 @ 17:49):    No enteric pathogens isolated.    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 Admitted for further management    4B course: Pt was treated with IVF. C diff neg. Seen by GI who recommneded OP colonoscopy. Pt was also treatwed with rocephin for positive UA. Seen by urology: OP cystoscopy. UCx Neg and BCx NGTD. Stable for DC.     #Chronic Diarrhea  -HD stable, appears cachectic  -No SIRS  -4-5 episodes/ day of soft watery stools. No association with lactose intake  - CTAP w/ iv contrast: 1. There is moderate right and mild left hydronephrosis and hydroureter   to the level of the mid pelvis. No ureteral stones are identified. A 2-3   hour delayed CT scan, allowing time for contrast to extend through the   ureters, may be helpful to further delineate the site of obstruction. (No   additional contrast is necessary.)  2. Splenomegaly (16 cm in length).  3. Surgical clips and anastomosis noted in the RLQ.  -No h/o NSAID or PPI use  -Cdiff neg, GI PCR neg  -Fu ESR CRP vit ADEK level, stool cx, Bcx, ova parasites, stool osm (secretory vs osmotic diarrhea), calprotectin, fat  - as outpatient with GI   -s/p IVF LR   - bicarb 17. started on po bicarb 650mg bid. repeat bicarb : 23. po bicarb dc'ed.   - loperamide prn  - GI Cs noted: OP colonoscopy  and EGD    #KHANH on CKD II(likely prerenal)- resolved  #Hypomagnesemia-repleted  -Cr downtrending     #Dysuria  #pyuria  #Neurogenic bladder  #Chronic b/l hydro  -UA positive for WBC and LE   - UCX neg  - BCX NGTD  -CTAP: B/l hydro, no stone  -Fu repeat CT for delayed contrast clearance  -Seen by urologist Dr. bonilla in [facility] who reccomended bee  -Pt refusing bee in ED  -s/p aztreonam in ED, pt states she got a rash to ampicillin.  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 Montgomery OP   - not on any treatments per HIE     #Supportive Management:  Dispo:  Home vs SNF  DVT Ppx: heparin   Injectable 5000 Unit(s) SubCutaneous every 8 hours  GI Ppx: Diet:  Diet, Regular (07-09-25 @ 01:02) [Active]      Total time spent to complete patient's bedside assessment, review medical chart, discuss medical plan of care with covering medical team was more than 45 minutes with >50% of time spent face to face with patient, discussion with patient/family and/or coordination of care    Housestaff's notes reviewed. When there is confusion regarding the content or information provided in the notes of a housestaff (resident, medical student, physician assistant, or nurse practioner) and the attending physician notes, the attending physician's note takes precedence and supersedes the other notes.    Rodríguez Pope MD/Leonardo  Attending Physician MEDICINE ATTENDING PROGRESS NOTE  85-year-old female with PMH cervical cancer (s/p treatment 10 years ago), neurogenic bladder, arthritis, suspected CLL (follows w/ Odaimi)  presents to the ED for evaluation of intermittent diarrhea since January. Admitted for further management    Interval/Overnight Events  - Reports loose stool; about 1-2 episode per day  - GI PCR and C diff neg -> will do a trial of loperamide  - Patient to follow stool studies with GI; probably need EGD/Colonoscopy  - Hematuria resolves today  - dispo planning to [facility]    ANDREI  General: Denies fevers, chills, nightsweats, weight loss  HEENT: Denies headache, rhinorrhea, sore throat, eye pain  CV: Denies CP, palpitations  PULM: Denies SOB, cough  GI: Denies abdominal pain, diarrhea  : Denies dysuria, hematuria  MSK: Denies arthralgias  SKIN: Denies rash   NEURO: Denies paresthesias, weakness  PSYCH: Denies depression    MEDICATIONS  (STANDING):  heparin   Injectable 5000 Unit(s) SubCutaneous every 8 hours  tamsulosin 0.4 milliGRAM(s) Oral at bedtime    MEDICATIONS  (PRN):  haloperidol    Injectable 5 milliGRAM(s) IntraMuscular once PRN agitation  loperamide 2 milliGRAM(s) Oral four times a day PRN Diarrhea    ANTIBIOTICS:      VITALS:  T(F): 97.5, Max: 97.8 (07-11-25 @ 16:31)  HR: 58  BP: 125/75  RR: 18Vital Signs Last 24 Hrs  T(C): 36.4 (12 Jul 2025 07:00), Max: 36.6 (11 Jul 2025 16:31)  T(F): 97.5 (12 Jul 2025 07:00), Max: 97.8 (11 Jul 2025 16:31)  HR: 58 (12 Jul 2025 07:00) (58 - 83)  BP: 125/75 (12 Jul 2025 07:00) (122/61 - 147/64)  BP(mean): --  RR: 18 (12 Jul 2025 07:00) (18 - 19)  SpO2: 95% (12 Jul 2025 07:00) (95% - 96%)    Parameters below as of 12 Jul 2025 07:00  Patient On (Oxygen Delivery Method): room air     I&O's Summary    PHYSICAL EXAM:  Gen: NAD, resting in bed  HEENT: Normocephalic, atraumatic  Neck: supple, no lymphadenopathy  CV: Regular rate & regular rhythm  Lungs: CTABL no wheeze  Abdomen: Soft, NTND+ BS present  Ext: Warm, well perfused no CCE  Neuro: non focal, awake, CN II-XII intact   Skin: no rash, no erythema  Psych: no SI, HI, Hallucination     LABS:                        11.0   39.58 )-----------( 119      ( 11 Jul 2025 19:45 )             34.9     07-11    142  |  110  |  18  ----------------------------<  85  3.9   |  23  |  1.0    Ca    8.4      11 Jul 2025 06:12  Mg     1.7     07-11      MICROBIOLOGY:  Urinalysis Basic - ( 11 Jul 2025 06:12 )  Color: x / Appearance: x / SG: x / pH: x  Gluc: 85 mg/dL / Ketone: x  / Bili: x / Urobili: x   Blood: x / Protein: x / Nitrite: x   Leuk Esterase: x / RBC: x / WBC x   Sq Epi: x / Non Sq Epi: x / Bacteria: x      Culture - Acid Fast - Stool w/Smear (collected 07-11-25 @ 10:05)  Source: Stool Stool    Culture - Blood (collected 07-08-25 @ 22:30)  Source: Blood Blood  Preliminary Report (07-12-25 @ 02:02):    No growth at 72 Hours    Culture - Blood (collected 07-08-25 @ 22:30)  Source: Blood Blood  Preliminary Report (07-12-25 @ 02:02):    No growth at 72 Hours    Urinalysis with Rflx Culture (collected 07-08-25 @ 20:40)    Culture - Urine (collected 07-08-25 @ 20:40)  Source: Clean Catch None  Final Report (07-09-25 @ 22:15):    <10,000 CFU/mL Normal Urogenital Nilda    Culture - Stool (collected 07-08-25 @ 17:25)  Source: Stool Feces  Final Report (07-10-25 @ 17:49):    No enteric pathogens isolated.    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 Admitted for further management    4B course: Pt was treated with IVF. C diff neg. Seen by GI who recommneded OP colonoscopy. Pt was also treatwed with rocephin for positive UA. Seen by urology: OP cystoscopy. UCx Neg and BCx NGTD. Stable for DC.     #Chronic Diarrhea  -HD stable, appears cachectic  -No SIRS  -4-5 episodes/ day of soft watery stools. No association with lactose intake  - CTAP w/ iv contrast: 1. There is moderate right and mild left hydronephrosis and hydroureter   to the level of the mid pelvis. No ureteral stones are identified. A 2-3   hour delayed CT scan, allowing time for contrast to extend through the   ureters, may be helpful to further delineate the site of obstruction. (No   additional contrast is necessary.)  2. Splenomegaly (16 cm in length).  3. Surgical clips and anastomosis noted in the RLQ.  -No h/o NSAID or PPI use  -Cdiff neg, GI PCR neg  -Fu ESR CRP vit ADEK level, stool cx, Bcx, ova parasites, stool osm (secretory vs osmotic diarrhea), calprotectin, fat  - as outpatient with GI   -s/p IVF LR   - bicarb 17. started on po bicarb 650mg bid. repeat bicarb : 23. po bicarb dc'ed.   - loperamide prn  - GI Cs noted: OP colonoscopy  and EGD    #KHANH on CKD II(likely prerenal)- resolved  #Hypomagnesemia-repleted  -Cr downtrending     #Dysuria  #pyuria  #Hematuria, resolved  #Neurogenic bladder  #Chronic b/l hydro  - RN reports hematuria 7/11; resolved. H/H stable at 11, was previously 10.6  -UA positive for WBC and LE   - UCX neg  - BCX NGTD  -CTAP: B/l hydro, no stone  -Fu repeat CT for delayed contrast clearance  -Seen by urologist Dr. bonilla in [facility] who reccomended bee  -Pt refusing bee in ED  -s/p aztreonam in ED, pt states she got a rash to ampicillin.  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 Montgomery OP   - not on any treatments per HIE     #Supportive Management:  Dispo:  Home vs SNF  DVT Ppx: heparin   Injectable 5000 Unit(s) SubCutaneous every 8 hours  GI Ppx: Diet:  Diet, Regular (07-09-25 @ 01:02) [Active]      Total time spent to complete patient's bedside assessment, review medical chart, discuss medical plan of care with covering medical team was more than 45 minutes with >50% of time spent face to face with patient, discussion with patient/family and/or coordination of care    Housestaff's notes reviewed. When there is confusion regarding the content or information provided in the notes of a housestaff (resident, medical student, physician assistant, or nurse practioner) and the attending physician notes, the attending physician's note takes precedence and supersedes the other notes.    Rodríguez Pope MD/Leonrado  Attending Physician

## 2025-07-13 LAB
ANION GAP SERPL CALC-SCNC: 12 MMOL/L — SIGNIFICANT CHANGE UP (ref 7–14)
APTT BLD: 31.3 SEC — SIGNIFICANT CHANGE UP (ref 27–39.2)
BASOPHILS # BLD AUTO: 0.05 K/UL — SIGNIFICANT CHANGE UP (ref 0–0.2)
BASOPHILS NFR BLD AUTO: 0.1 % — SIGNIFICANT CHANGE UP (ref 0–1)
BUN SERPL-MCNC: 19 MG/DL — SIGNIFICANT CHANGE UP (ref 10–20)
CALCIUM SERPL-MCNC: 8.9 MG/DL — SIGNIFICANT CHANGE UP (ref 8.4–10.5)
CHLORIDE SERPL-SCNC: 109 MMOL/L — SIGNIFICANT CHANGE UP (ref 98–110)
CO2 SERPL-SCNC: 21 MMOL/L — SIGNIFICANT CHANGE UP (ref 17–32)
CREAT SERPL-MCNC: 1 MG/DL — SIGNIFICANT CHANGE UP (ref 0.7–1.5)
EGFR: 56 ML/MIN/1.73M2 — LOW
EGFR: 56 ML/MIN/1.73M2 — LOW
EOSINOPHIL # BLD AUTO: 0.1 K/UL — SIGNIFICANT CHANGE UP (ref 0–0.7)
EOSINOPHIL NFR BLD AUTO: 0.2 % — SIGNIFICANT CHANGE UP (ref 0–8)
GLUCOSE SERPL-MCNC: 78 MG/DL — SIGNIFICANT CHANGE UP (ref 70–99)
HCT VFR BLD CALC: 35.3 % — LOW (ref 37–47)
HGB BLD-MCNC: 11 G/DL — LOW (ref 12–16)
IMM GRANULOCYTES NFR BLD AUTO: 0.2 % — SIGNIFICANT CHANGE UP (ref 0.1–0.3)
LYMPHOCYTES # BLD AUTO: 35.73 K/UL — HIGH (ref 1.2–3.4)
LYMPHOCYTES # BLD AUTO: 86.4 % — HIGH (ref 20.5–51.1)
MCHC RBC-ENTMCNC: 28.6 PG — SIGNIFICANT CHANGE UP (ref 27–31)
MCHC RBC-ENTMCNC: 31.2 G/DL — LOW (ref 32–37)
MCV RBC AUTO: 91.7 FL — SIGNIFICANT CHANGE UP (ref 81–99)
MONOCYTES # BLD AUTO: 1.81 K/UL — HIGH (ref 0.1–0.6)
MONOCYTES NFR BLD AUTO: 4.4 % — SIGNIFICANT CHANGE UP (ref 1.7–9.3)
NEUTROPHILS # BLD AUTO: 3.61 K/UL — SIGNIFICANT CHANGE UP (ref 1.4–6.5)
NEUTROPHILS NFR BLD AUTO: 8.7 % — LOW (ref 42.2–75.2)
NRBC BLD AUTO-RTO: 0 /100 WBCS — SIGNIFICANT CHANGE UP (ref 0–0)
PLATELET # BLD AUTO: 116 K/UL — LOW (ref 130–400)
PMV BLD: 10.9 FL — HIGH (ref 7.4–10.4)
POTASSIUM SERPL-MCNC: 3.3 MMOL/L — LOW (ref 3.5–5)
POTASSIUM SERPL-SCNC: 3.3 MMOL/L — LOW (ref 3.5–5)
RBC # BLD: 3.85 M/UL — LOW (ref 4.2–5.4)
RBC # FLD: 15.5 % — HIGH (ref 11.5–14.5)
SODIUM SERPL-SCNC: 142 MMOL/L — SIGNIFICANT CHANGE UP (ref 135–146)
WBC # BLD: 41.37 K/UL — CRITICAL HIGH (ref 4.8–10.8)
WBC # FLD AUTO: 41.37 K/UL — CRITICAL HIGH (ref 4.8–10.8)

## 2025-07-13 PROCEDURE — 99232 SBSQ HOSP IP/OBS MODERATE 35: CPT

## 2025-07-13 RX ADMIN — TAMSULOSIN HYDROCHLORIDE 0.4 MILLIGRAM(S): 0.4 CAPSULE ORAL at 21:58

## 2025-07-13 NOTE — PROGRESS NOTE ADULT - SUBJECTIVE AND OBJECTIVE BOX
MEDICINE ATTENDING PROGRESS NOTE  85-year-old female with PMH cervical cancer (s/p treatment 10 years ago), neurogenic bladder, arthritis, suspected CLL (follows w/ Odaimi)  presents to the ED for evaluation of intermittent diarrhea since January. Admitted for further management    Interval/Overnight Events  - Still Reports loose stool; about 1-2 episode per day  - Infectious work up neg so far; on a loperamide trial  - Patient to follow stool studies with GI; probably need EGD/Colonoscopy  - No further episode of Hematuria  - dispo planning to [facility]    ANDREI  General: Denies fevers, chills, nightsweats, weight loss  HEENT: Denies headache, rhinorrhea, sore throat, eye pain  CV: Denies CP, palpitations  PULM: Denies SOB, cough  GI: Denies abdominal pain, diarrhea  : Denies dysuria, hematuria  MSK: Denies arthralgias  SKIN: Denies rash   NEURO: Denies paresthesias, weakness  PSYCH: Denies depression    MEDICATIONS  (STANDING):  heparin   Injectable 5000 Unit(s) SubCutaneous every 8 hours  tamsulosin 0.4 milliGRAM(s) Oral at bedtime    MEDICATIONS  (PRN):  haloperidol    Injectable 5 milliGRAM(s) IntraMuscular once PRN agitation  loperamide 2 milliGRAM(s) Oral four times a day PRN Diarrhea    ANTIBIOTICS:      VITALS:  ICU Vital Signs Last 24 Hrs  T(C): 36.8 (13 Jul 2025 07:00), Max: 36.8 (13 Jul 2025 07:00)  T(F): 98.2 (13 Jul 2025 07:00), Max: 98.2 (13 Jul 2025 07:00)  HR: 60 (13 Jul 2025 07:00) (60 - 73)  BP: 137/66 (13 Jul 2025 07:00) (107/69 - 147/64)  BP(mean): --  ABP: --  ABP(mean): --  RR: 18 (13 Jul 2025 07:00) (18 - 18)  SpO2: 99% (13 Jul 2025 07:00) (98% - 99%)    O2 Parameters below as of 13 Jul 2025 07:00  Patient On (Oxygen Delivery Method): room air      PHYSICAL EXAM:  Gen: NAD, resting in bed  HEENT: Normocephalic, atraumatic  Neck: supple, no lymphadenopathy  CV: Regular rate & regular rhythm  Lungs: CTABL no wheeze  Abdomen: Soft, NTND+ BS present  Ext: Warm, well perfused no CCE  Neuro: non focal, awake, CN II-XII intact   Skin: no rash, no erythema  Psych: no SI, HI, Hallucination     LABS:                        11.0   39.58 )-----------( 119      ( 11 Jul 2025 19:45 )             34.9     07-11    142  |  110  |  18  ----------------------------<  85  3.9   |  23  |  1.0    Ca    8.4      11 Jul 2025 06:12  Mg     1.7     07-11      MICROBIOLOGY:  Urinalysis Basic - ( 11 Jul 2025 06:12 )  Color: x / Appearance: x / SG: x / pH: x  Gluc: 85 mg/dL / Ketone: x  / Bili: x / Urobili: x   Blood: x / Protein: x / Nitrite: x   Leuk Esterase: x / RBC: x / WBC x   Sq Epi: x / Non Sq Epi: x / Bacteria: x      Culture - Acid Fast - Stool w/Smear (collected 07-11-25 @ 10:05)  Source: Stool Stool    Culture - Blood (collected 07-08-25 @ 22:30)  Source: Blood Blood  Preliminary Report (07-12-25 @ 02:02):    No growth at 72 Hours    Culture - Blood (collected 07-08-25 @ 22:30)  Source: Blood Blood  Preliminary Report (07-12-25 @ 02:02):    No growth at 72 Hours    Urinalysis with Rflx Culture (collected 07-08-25 @ 20:40)    Culture - Urine (collected 07-08-25 @ 20:40)  Source: Clean Catch None  Final Report (07-09-25 @ 22:15):    <10,000 CFU/mL Normal Urogenital Nilda    Culture - Stool (collected 07-08-25 @ 17:25)  Source: Stool Feces  Final Report (07-10-25 @ 17:49):    No enteric pathogens isolated.    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 Admitted for further management    4B course: Pt was treated with IVF. C diff neg. Seen by GI who recommneded OP colonoscopy. Pt was also treatwed with rocephin for positive UA. Seen by urology: OP cystoscopy. UCx Neg and BCx NGTD. Stable for DC.     #Chronic Diarrhea  -HD stable, appears cachectic  -No SIRS  -4-5 episodes/ day of soft watery stools. No association with lactose intake  - CTAP w/ iv contrast: 1. There is moderate right and mild left hydronephrosis and hydroureter   to the level of the mid pelvis. No ureteral stones are identified. A 2-3   hour delayed CT scan, allowing time for contrast to extend through the   ureters, may be helpful to further delineate the site of obstruction. (No   additional contrast is necessary.)  2. Splenomegaly (16 cm in length).  3. Surgical clips and anastomosis noted in the RLQ.  -No h/o NSAID or PPI use  -Cdiff neg, GI PCR neg  -Fu ESR CRP vit ADEK level, stool cx, Bcx, ova parasites, stool osm (secretory vs osmotic diarrhea), calprotectin, fat  - as outpatient with GI   -s/p IVF LR   - bicarb 17. started on po bicarb 650mg bid. repeat bicarb : 23. po bicarb dc'ed.   - loperamide prn  - GI Cs noted: OP colonoscopy  and EGD    #KHANH on CKD II(likely prerenal)- resolved  #Hypomagnesemia-repleted  -Cr downtrending     #Dysuria  #pyuria  #Hematuria, resolved  #Neurogenic bladder  #Chronic b/l hydro  - RN reports hematuria 7/11; resolved. H/H stable at 11, was previously 10.6  -UA positive for WBC and LE   - UCX neg  - BCX NGTD  -CTAP: B/l hydro, no stone  -Fu repeat CT for delayed contrast clearance  -Seen by urologist Dr. bonilla in [facility] who reccomended bee  -Pt refusing bee in ED  -s/p aztreonam in ED, pt states she got a rash to ampicillin.  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 Montgomery OP   - not on any treatments per HIE     #Supportive Management:  Dispo:  Home vs SNF  DVT Ppx: heparin   Injectable 5000 Unit(s) SubCutaneous every 8 hours  GI Ppx: Diet:  Diet, Regular (07-09-25 @ 01:02) [Active]      Total time spent to complete patient's bedside assessment, review medical chart, discuss medical plan of care with covering medical team was more than 45 minutes with >50% of time spent face to face with patient, discussion with patient/family and/or coordination of care    Housestaff's notes reviewed. When there is confusion regarding the content or information provided in the notes of a housestaff (resident, medical student, physician assistant, or nurse practioner) and the attending physician notes, the attending physician's note takes precedence and supersedes the other notes.    Rodríguez Pope MD/Leonardo  Attending Physician

## 2025-07-13 NOTE — CHART NOTE - NSCHARTNOTEFT_GEN_A_CORE
RN reports patient with bloody running down her legs after BM. Vitals stable. Exam benign. blood and stool on the floor. Unclear if is vaginal vs GI.  - will get CBC and lactate. Transfuse if drops in hbg  - will do trial of PPI, Pelvic US, and GI reconsulted for GIB  - will cont to monitor

## 2025-07-14 LAB
CULTURE RESULTS: SIGNIFICANT CHANGE UP
CULTURE RESULTS: SIGNIFICANT CHANGE UP
MENADIONE SERPL-MCNC: 0.17 NG/ML — SIGNIFICANT CHANGE UP (ref 0.1–2.2)
SPECIMEN SOURCE: SIGNIFICANT CHANGE UP
SPECIMEN SOURCE: SIGNIFICANT CHANGE UP

## 2025-07-14 PROCEDURE — 99232 SBSQ HOSP IP/OBS MODERATE 35: CPT

## 2025-07-14 PROCEDURE — 76856 US EXAM PELVIC COMPLETE: CPT | Mod: 26

## 2025-07-14 PROCEDURE — 99233 SBSQ HOSP IP/OBS HIGH 50: CPT

## 2025-07-14 RX ORDER — POLYETHYLENE GLYCOL-3350 AND ELECTROLYTES 236; 6.74; 5.86; 2.97; 22.74 G/274.31G; G/274.31G; G/274.31G; G/274.31G; G/274.31G
4000 POWDER, FOR SOLUTION ORAL ONCE
Refills: 0 | Status: COMPLETED | OUTPATIENT
Start: 2025-07-14 | End: 2025-07-14

## 2025-07-14 RX ADMIN — Medication 20 MILLIEQUIVALENT(S): at 06:55

## 2025-07-14 RX ADMIN — POLYETHYLENE GLYCOL-3350 AND ELECTROLYTES 4000 MILLILITER(S): 236; 6.74; 5.86; 2.97; 22.74 POWDER, FOR SOLUTION ORAL at 16:59

## 2025-07-14 RX ADMIN — Medication 1 APPLICATION(S): at 06:52

## 2025-07-14 NOTE — PROGRESS NOTE ADULT - SUBJECTIVE AND OBJECTIVE BOX
Chief Complaint:  Patient is a 85y old  Female who presents with a chief complaint of Diarrhea (11 Jul 2025 10:07)    HPI/ 24 hr events: Patient seen and examined at bedside. Pt feeling well this morning. Tolerating diet. BM. Denies nausea, vomiting, diarrhea, abdominal pain, hematemesis, hematochezia, melena. Vitals are overall stable, no leukocytosis, Hgb , LFTs stable.     REVIEW OF SYSTEMS:   Negative except for HPI    MEDICATIONS:   MEDICATIONS  (STANDING):  chlorhexidine 2% Cloths 1 Application(s) Topical <User Schedule>  pantoprazole  Injectable 40 milliGRAM(s) IV Push two times a day  tamsulosin 0.4 milliGRAM(s) Oral at bedtime    MEDICATIONS  (PRN):  haloperidol    Injectable 5 milliGRAM(s) IntraMuscular once PRN agitation  loperamide 2 milliGRAM(s) Oral four times a day PRN Diarrhea    DIET:  Diet, Regular (07-09-25 @ 01:02) [Active]      ALLERGIES:   Allergies    ampicillin (Rash)  levofloxacin (Rash)    Intolerances    VITAL SIGNS:   Vital Signs Last 24 Hrs  T(C): 36.7 (14 Jul 2025 08:00), Max: 37.1 (14 Jul 2025 00:00)  T(F): 98 (14 Jul 2025 08:00), Max: 98.8 (14 Jul 2025 00:00)  HR: 80 (14 Jul 2025 08:00) (69 - 80)  BP: 130/54 (14 Jul 2025 08:00) (107/66 - 130/54)  BP(mean): --  RR: 18 (14 Jul 2025 08:00) (18 - 18)  SpO2: 99% (14 Jul 2025 08:00) (99% - 99%)    Parameters below as of 14 Jul 2025 08:00  Patient On (Oxygen Delivery Method): room air      I&O's Summary      PHYSICAL EXAM:   GENERAL:  No acute distress  HEENT:  NC/AT, conjunctiva clear, sclera anicteric  CHEST:  No increased effort  HEART:  Regular rate  ABDOMEN:  Soft, non-tender, non-distended, normoactive bowel sounds, no rebound or guarding  EXTREMITIES: No edema  SKIN:  Warm, dry  NEURO:  Calm, cooperative    LABS:                        11.0   41.37 )-----------( 116      ( 13 Jul 2025 11:47 )             35.3     Hemoglobin: 11.0 g/dL (07-13-25 @ 11:47)  Hemoglobin: 11.0 g/dL (07-11-25 @ 19:45)    07-13    142  |  109  |  19  ----------------------------<  78  3.3[L]   |  21  |  1.0    Ca    8.9      13 Jul 2025 11:47      PTT - ( 13 Jul 2025 11:47 )  PTT:31.3 sec    Culture - Acid Fast - Stool w/Smear (collected 11 Jul 2025 10:05)  Source: Stool Stool  Preliminary Report (12 Jul 2025 23:07):    Culture is being performed.    GI PCR Panel: Bertha (07-11-25 @ 12:47)   Chief Complaint:  Patient is a 85y old  Female who presents with a chief complaint of Diarrhea (11 Jul 2025 10:07)    HPI/ 24 hr events:   RN reports patient with bloody running down her legs after BM. Vitals stable. Exam benign. blood and stool on the floor. Unclear if is vaginal vs GI. Currently on PPI. GI reconsulted for GIB.     Patient seen and examined at bedside. Pt feeling well this morning. Tolerating diet. BM. Denies nausea, vomiting, diarrhea, abdominal pain, hematemesis, hematochezia, melena. Vitals are overall stable, no leukocytosis, Hgb , LFTs stable.     REVIEW OF SYSTEMS:   Negative except for HPI    MEDICATIONS:   MEDICATIONS  (STANDING):  chlorhexidine 2% Cloths 1 Application(s) Topical <User Schedule>  pantoprazole  Injectable 40 milliGRAM(s) IV Push two times a day  tamsulosin 0.4 milliGRAM(s) Oral at bedtime    MEDICATIONS  (PRN):  haloperidol    Injectable 5 milliGRAM(s) IntraMuscular once PRN agitation  loperamide 2 milliGRAM(s) Oral four times a day PRN Diarrhea    DIET:  Diet, Regular (07-09-25 @ 01:02) [Active]      ALLERGIES:   Allergies    ampicillin (Rash)  levofloxacin (Rash)    Intolerances    VITAL SIGNS:   Vital Signs Last 24 Hrs  T(C): 36.7 (14 Jul 2025 08:00), Max: 37.1 (14 Jul 2025 00:00)  T(F): 98 (14 Jul 2025 08:00), Max: 98.8 (14 Jul 2025 00:00)  HR: 80 (14 Jul 2025 08:00) (69 - 80)  BP: 130/54 (14 Jul 2025 08:00) (107/66 - 130/54)  BP(mean): --  RR: 18 (14 Jul 2025 08:00) (18 - 18)  SpO2: 99% (14 Jul 2025 08:00) (99% - 99%)    Parameters below as of 14 Jul 2025 08:00  Patient On (Oxygen Delivery Method): room air      I&O's Summary      PHYSICAL EXAM:   GENERAL:  No acute distress  HEENT:  NC/AT, conjunctiva clear, sclera anicteric  CHEST:  No increased effort  HEART:  Regular rate  ABDOMEN:  Soft, non-tender, non-distended, normoactive bowel sounds, no rebound or guarding  EXTREMITIES: No edema  SKIN:  Warm, dry  NEURO:  Calm, cooperative    LABS:                        11.0   41.37 )-----------( 116      ( 13 Jul 2025 11:47 )             35.3     Hemoglobin: 11.0 g/dL (07-13-25 @ 11:47)  Hemoglobin: 11.0 g/dL (07-11-25 @ 19:45)    07-13    142  |  109  |  19  ----------------------------<  78  3.3[L]   |  21  |  1.0    Ca    8.9      13 Jul 2025 11:47      PTT - ( 13 Jul 2025 11:47 )  PTT:31.3 sec    Culture - Acid Fast - Stool w/Smear (collected 11 Jul 2025 10:05)  Source: Stool Stool  Preliminary Report (12 Jul 2025 23:07):    Culture is being performed.    GI PCR Panel: Bertha (07-11-25 @ 12:47)   Chief Complaint:  Patient is a 85y old  Female who presents with a chief complaint of Diarrhea (11 Jul 2025 10:07)    HPI/ 24 hr events:   RN reports patient with bloody running down her legs after BM. Vitals stable. Exam benign. blood and stool on the floor. Unclear if is vaginal vs GI. Currently on PPI. GI reconsulted for GIB.     Patient seen and examined at bedside. Patient reproted 3 BM yesterday, which were loose and watery and 2 BM today too. She reported seeing blood yesterday and some today and only sees it when she strains. Denies nausea, vomiting, abdominal pain, hematemesis. Vitals are overall stable, persistent leukocytosis, Hgb stable, LFTs stable.     REVIEW OF SYSTEMS:   Negative except for HPI    MEDICATIONS:   MEDICATIONS  (STANDING):  chlorhexidine 2% Cloths 1 Application(s) Topical <User Schedule>  pantoprazole  Injectable 40 milliGRAM(s) IV Push two times a day  tamsulosin 0.4 milliGRAM(s) Oral at bedtime    MEDICATIONS  (PRN):  haloperidol    Injectable 5 milliGRAM(s) IntraMuscular once PRN agitation  loperamide 2 milliGRAM(s) Oral four times a day PRN Diarrhea    DIET:  Diet, Regular (07-09-25 @ 01:02) [Active]      ALLERGIES:   Allergies    ampicillin (Rash)  levofloxacin (Rash)    Intolerances    VITAL SIGNS:   Vital Signs Last 24 Hrs  T(C): 36.7 (14 Jul 2025 08:00), Max: 37.1 (14 Jul 2025 00:00)  T(F): 98 (14 Jul 2025 08:00), Max: 98.8 (14 Jul 2025 00:00)  HR: 80 (14 Jul 2025 08:00) (69 - 80)  BP: 130/54 (14 Jul 2025 08:00) (107/66 - 130/54)  BP(mean): --  RR: 18 (14 Jul 2025 08:00) (18 - 18)  SpO2: 99% (14 Jul 2025 08:00) (99% - 99%)    Parameters below as of 14 Jul 2025 08:00  Patient On (Oxygen Delivery Method): room air      I&O's Summary      PHYSICAL EXAM:   GENERAL:  No acute distress  HEENT:  NC/AT, conjunctiva clear, sclera anicteric  CHEST:  No increased effort  HEART:  Regular rate  ABDOMEN:  Soft, non-tender, non-distended, normoactive bowel sounds, no rebound or guarding  EXTREMITIES: No edema  SKIN:  Warm, dry  NEURO:  Calm, cooperative    LABS:                        11.0   41.37 )-----------( 116      ( 13 Jul 2025 11:47 )             35.3     Hemoglobin: 11.0 g/dL (07-13-25 @ 11:47)  Hemoglobin: 11.0 g/dL (07-11-25 @ 19:45)    07-13    142  |  109  |  19  ----------------------------<  78  3.3[L]   |  21  |  1.0    Ca    8.9      13 Jul 2025 11:47      PTT - ( 13 Jul 2025 11:47 )  PTT:31.3 sec    Culture - Acid Fast - Stool w/Smear (collected 11 Jul 2025 10:05)  Source: Stool Stool  Preliminary Report (12 Jul 2025 23:07):    Culture is being performed.    GI PCR Panel: Maishatec (07-11-25 @ 12:47)   Chief Complaint:  Patient is a 85y old  Female who presents with a chief complaint of Diarrhea (11 Jul 2025 10:07)    HPI/ 24 hr events:   RN reports patient with bloody running down her legs after BM. Vitals stable. Exam benign. blood and stool on the floor. Unclear if is vaginal vs GI. Currently on PPI. GI reconsulted for GIB.     Patient seen and examined at bedside. Patient reported 3 BM yesterday, which were loose and watery and 2 BM today too. She reported seeing blood yesterday and some today and only sees it when she strains. Denies nausea, vomiting, abdominal pain, hematemesis. Vitals are overall stable, persistent leukocytosis, Hgb stable, LFTs stable.     REVIEW OF SYSTEMS:   Negative except for HPI    MEDICATIONS:   MEDICATIONS  (STANDING):  chlorhexidine 2% Cloths 1 Application(s) Topical <User Schedule>  pantoprazole  Injectable 40 milliGRAM(s) IV Push two times a day  tamsulosin 0.4 milliGRAM(s) Oral at bedtime    MEDICATIONS  (PRN):  haloperidol    Injectable 5 milliGRAM(s) IntraMuscular once PRN agitation  loperamide 2 milliGRAM(s) Oral four times a day PRN Diarrhea    DIET:  Diet, Regular (07-09-25 @ 01:02) [Active]      ALLERGIES:   Allergies    ampicillin (Rash)  levofloxacin (Rash)    Intolerances    VITAL SIGNS:   Vital Signs Last 24 Hrs  T(C): 36.7 (14 Jul 2025 08:00), Max: 37.1 (14 Jul 2025 00:00)  T(F): 98 (14 Jul 2025 08:00), Max: 98.8 (14 Jul 2025 00:00)  HR: 80 (14 Jul 2025 08:00) (69 - 80)  BP: 130/54 (14 Jul 2025 08:00) (107/66 - 130/54)  BP(mean): --  RR: 18 (14 Jul 2025 08:00) (18 - 18)  SpO2: 99% (14 Jul 2025 08:00) (99% - 99%)    Parameters below as of 14 Jul 2025 08:00  Patient On (Oxygen Delivery Method): room air      I&O's Summary      PHYSICAL EXAM:   GENERAL:  No acute distress  HEENT:  NC/AT, conjunctiva clear, sclera anicteric  CHEST:  No increased effort  HEART:  Regular rate  ABDOMEN:  Soft, non-tender, non-distended, normoactive bowel sounds, no rebound or guarding  EXTREMITIES: No edema  SKIN:  Warm, dry  NEURO:  Calm, cooperative    LABS:                        11.0   41.37 )-----------( 116      ( 13 Jul 2025 11:47 )             35.3     Hemoglobin: 11.0 g/dL (07-13-25 @ 11:47)  Hemoglobin: 11.0 g/dL (07-11-25 @ 19:45)    07-13    142  |  109  |  19  ----------------------------<  78  3.3[L]   |  21  |  1.0    Ca    8.9      13 Jul 2025 11:47      PTT - ( 13 Jul 2025 11:47 )  PTT:31.3 sec    Culture - Acid Fast - Stool w/Smear (collected 11 Jul 2025 10:05)  Source: Stool Stool  Preliminary Report (12 Jul 2025 23:07):    Culture is being performed.    GI PCR Panel: Maishatedavid (07-11-25 @ 12:47)

## 2025-07-14 NOTE — PROGRESS NOTE ADULT - SUBJECTIVE AND OBJECTIVE BOX
FELICE LOMBARDI 85y Female  MRN#: [id number]     Hospital Day: 6d    Pt is currently admitted with the primary diagnosis of  Urinary tract infection        SUBJECTIVE     Subjective complaints  Pt was evaluated this am. Patient has no complaints    Review of systems  ROS is negative                                            ----------------------------------------------------------    HOSPITAL COURSE      7/14 - Patient was seen and examined at bedside in the morning. no bloody bowel movements overnight. bleeding most likely hematuria                                             ----------------------------------------------------------     OBJECTIVE  PAST MEDICAL & SURGICAL HISTORY  Arthritis    Cervical cancer    No significant past surgical history                                              -----------------------------------------------------------  ALLERGIES:  ampicillin (Rash)  levofloxacin (Rash)                                            ------------------------------------------------------------    HOME MEDICATIONS  Home Medications:  Gemtesa 75 mg oral tablet: 1 tab(s) orally once a day (10 Jul 2025 14:59)                           MEDICATIONS:  STANDING MEDICATIONS  chlorhexidine 2% Cloths 1 Application(s) Topical <User Schedule>  pantoprazole  Injectable 40 milliGRAM(s) IV Push two times a day  tamsulosin 0.4 milliGRAM(s) Oral at bedtime    PRN MEDICATIONS  haloperidol    Injectable 5 milliGRAM(s) IntraMuscular once PRN  loperamide 2 milliGRAM(s) Oral four times a day PRN                                            ------------------------------------------------------------  VITAL SIGNS: Last 24 Hours  T(C): 36.7 (14 Jul 2025 08:00), Max: 37.1 (14 Jul 2025 00:00)  T(F): 98 (14 Jul 2025 08:00), Max: 98.8 (14 Jul 2025 00:00)  HR: 80 (14 Jul 2025 08:00) (69 - 80)  BP: 130/54 (14 Jul 2025 08:00) (107/66 - 130/54)  BP(mean): --  RR: 18 (14 Jul 2025 08:00) (18 - 18)  SpO2: 99% (14 Jul 2025 08:00) (99% - 99%)                                             --------------------------------------------------------------  LABS:                        11.0   41.37 )-----------( 116      ( 13 Jul 2025 11:47 )             35.3     07-13    142  |  109  |  19  ----------------------------<  78  3.3[L]   |  21  |  1.0    Ca    8.9      13 Jul 2025 11:47      PTT - ( 13 Jul 2025 11:47 )  PTT:31.3 sec  Urinalysis Basic - ( 13 Jul 2025 11:47 )    Color: x / Appearance: x / SG: x / pH: x  Gluc: 78 mg/dL / Ketone: x  / Bili: x / Urobili: x   Blood: x / Protein: x / Nitrite: x   Leuk Esterase: x / RBC: x / WBC x   Sq Epi: x / Non Sq Epi: x / Bacteria: x                                                            -------------------------------------------------------------  RADIOLOGY, TELEMETRY:                                            --------------------------------------------------------------    PHYSICAL EXAM:  GENERAL: NAD, lying in bed comfortably  HEAD:  Atraumatic, Normocephalic  CHEST/LUNG: Clear to auscultation bilaterally; No rales, rhonchi, wheezing, or rubs. Unlabored respirations  HEART: regular rate and rhythm; No murmurs, rubs, or gallops  ABDOMEN: Bowel sounds present; Soft, Nontender, Nondistended.    EXTREMITIES: Warm. No clubbing, cyanosis, or edema  NERVOUS SYSTEM:  Alert & Oriented X3. No focal deficits                                            --------------------------------------------------------------

## 2025-07-14 NOTE — PROGRESS NOTE ADULT - ASSESSMENT
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 Admitted for further management    4B course: Pt was treated with IVF. C diff neg. Seen by GI who recommneded OP colonoscopy. Pt was also treatwed with rocephin for positive UA. Seen by urology: OP cystoscopy. UCx Neg and BCx NGTD. Stable for DC.     #Chronic Diarrhea  -HD stable, appears cachectic  -No SIRS  -4-5 episodes/ day of soft watery stools. No association with lactose intake  - CTAP w/ iv contrast: 1. There is moderate right and mild left hydronephrosis and hydroureter   to the level of the mid pelvis. No ureteral stones are identified. A 2-3   hour delayed CT scan, allowing time for contrast to extend through the   ureters, may be helpful to further delineate the site of obstruction. (No   additional contrast is necessary.)  2. Splenomegaly (16 cm in length).  3. Surgical clips and anastomosis noted in the RLQ.  -No h/o NSAID or PPI use  -Cdiff neg, GI PCR neg  -Fu ESR CRP vit ADEK level, stool cx, Bcx, ova parasites, stool osm (secretory vs osmotic diarrhea), calprotectin, fat  - as outpatient with GI   -s/p IVF LR   - bicarb 17. started on po bicarb 650mg bid. repeat bicarb : 23. po bicarb dc'ed.   - loperamide prn  - GI Cs noted: OP colonoscopy  and EGD,   - GI reconsulted for possible GI bleed, less likely - Follow up TSH, fecal elastase, fecal leukocytes, fecal calprotectin, and fecal fat test.  - Pelvic u/s negative - post hysterectomy    #KHANH on CKD II(likely prerenal)- resolved  #Hypomagnesemia-repleted  -Cr downtrending     #Dysuria  #pyuria  #Hematuria, resolved  #Neurogenic bladder  #Chronic b/l hydro  - RN reports hematuria 7/11; resolved. H/H stable at 11, was previously 10.6  -UA positive for WBC and LE   - UCX neg  - BCX NGTD  -CTAP: B/l hydro, no stone  -Fu repeat CT for delayed contrast clearance  -Seen by urologist Dr. bonilla in [facility] who recomended bee  -Pt refusing bee in ED  -s/p aztreonam in ED, pt states she got a rash to ampicillin.  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 Montgomery OP   - not on any treatments per HIE     #Supportive Management:  Dispo:  Home vs SNF  DVT Ppx: heparin   Injectable 5000 Unit(s) SubCutaneous every 8 hours  GI Ppx: Diet:  Diet, Regular (07-09-25 @ 01:02) [Active

## 2025-07-14 NOTE — PROGRESS NOTE ADULT - ASSESSMENT
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 GI was initially consulted for chronic diarrhea and evaluation colonoscopy to rule out collagenous colitis. Patient was recommended outpatient colonoscopy as her diarrhea resolved. GI is reconsulted for GI bleed and evaluation for colonoscopy.     #Concern of GIB  #Chronic diarrhea   #History of cholecystectomy  #Surgical clips and bowel anastomosis in RLQ on CT abdomen   #Mild Pancreatic atrophy on CT abdomen  - Differentials for patient's chronic diarrhea include postcholecystectomy bile acid diarrhea, malabsorption disorder, thyroid problems, chronic pancreatic insufficiency. Fecal incontinence could be secondary to pelvic floor problems s/p hysterectomy. No infectious etiology identified.   - No hx of colonoscopy per patient.   - EGD 2 years ago per patient for heart burn which was unremarkable.   - Leukocytosis improving 30.5k (lymphocyte predominant), Hb 10.7 (baseline 11), creat 1.2 (baseline 0.9), UA positive.   - CTAP showed surgical clips and anastomosis in RLQ, moderate right and mild left hydronephrosis and hydroureter to the level of the mid pelvis, splenomegaly up to 16 cm in length, mild pancreatic atrophy.   - C diff is negative. RVP negative. Stool culture negative. GI PCR negative.   - 7/11: improvement in diarrhea, reports soft BM today with no blood.     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 Patient follows with Dr. Montgomery.   - Patient had episode of bleed which is unconfirmed if it is GI or vaginal.   - HD stable  -       RECOMMENDATIONS:  - Recommend sending TSH, fecal elastase, fecal leukocytes, fecal calprotectin, fecal fat test, and Anti-TTG IgA.          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 GI was initially consulted for chronic diarrhea and evaluation colonoscopy to rule out collagenous colitis. Patient was recommended outpatient colonoscopy as her diarrhea resolved. GI is reconsulted for GI bleed and evaluation for colonoscopy.     #Concern of GIB  #Chronic diarrhea   #History of cholecystectomy  #Surgical clips and bowel anastomosis in RLQ on CT abdomen   #Mild Pancreatic atrophy on CT abdomen  - Differentials for patient's chronic diarrhea include postcholecystectomy bile acid diarrhea, malabsorption disorder, thyroid problems, chronic pancreatic insufficiency. Fecal incontinence could be secondary to pelvic floor problems s/p hysterectomy. No infectious etiology identified.   - No hx of colonoscopy per patient.   - EGD 2 years ago per patient for heart burn which was unremarkable.   - Leukocytosis improving 30.5k (lymphocyte predominant), Hb 10.7 (baseline 11), creat 1.2 (baseline 0.9), UA positive.   - CTAP showed surgical clips and anastomosis in RLQ, moderate right and mild left hydronephrosis and hydroureter to the level of the mid pelvis, splenomegaly up to 16 cm in length, mild pancreatic atrophy.   - C diff is negative. RVP negative. Stool culture negative so far. GI PCR negative. Anti-TTG IgA negative.    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 Patient follows with Dr. Montgomery.   - Patient had episode of bleed which is unconfirmed if it is GI or vaginal.   - HD stable  - Hb stable at 11, hct 35.3    RECOMMENDATIONS:  - Follow up TSH, fecal elastase, fecal leukocytes, fecal calprotectin, and fecal fat test.        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 GI was initially consulted for chronic diarrhea and evaluation colonoscopy to rule out collagenous colitis. Patient was recommended outpatient colonoscopy as her diarrhea resolved. GI is reconsulted for GI bleed and evaluation for colonoscopy.     #Concern of GIB  #Chronic diarrhea   #History of cholecystectomy  #Surgical clips and bowel anastomosis in RLQ on CT abdomen   #Mild Pancreatic atrophy on CT abdomen  - Differentials for patient's chronic diarrhea include postcholecystectomy bile acid diarrhea, malabsorption disorder, thyroid problems, chronic pancreatic insufficiency. Fecal incontinence could be secondary to pelvic floor problems s/p hysterectomy. No infectious etiology identified. BRBPR is likely secondary to hemorrhoids but need to rule out other causes like diverticulosis, polyps, or CRC.   - No hx of colonoscopy per patient.   - Patient refused HEATH.  - EGD 2 years ago per patient for heart burn which was unremarkable.   - Leukocytosis improving 30.5k (lymphocyte predominant), Hb 10.7 (baseline 11), creat 1.2 (baseline 0.9), UA positive.   - CTAP showed surgical clips and anastomosis in RLQ, moderate right and mild left hydronephrosis and hydroureter to the level of the mid pelvis, splenomegaly up to 16 cm in length, mild pancreatic atrophy.   - C diff is negative. RVP negative. Stool culture negative so far. GI PCR negative. Anti-TTG IgA negative.  - Pelvic US: Unremarkable. Post hysterectomy.     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 Patient follows with Dr. Montgomery.   - Patient had episode of bleed which is unconfirmed if it is GI or vaginal.   - HD stable  - Hb stable at 11, hct 35.3    RECOMMENDATIONS:  - Follow up TSH, fecal elastase, fecal calprotectin, and fecal fat test.   - Recommend inpatient colonoscopy for BRBPR associated with diarrhea.  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   GI was initially consulted for chronic diarrhea and evaluation colonoscopy to rule out collagenous colitis. Patient was recommended outpatient colonoscopy as her diarrhea resolved. GI is reconsulted for GI bleed and evaluation for colonoscopy.     #Concern of GIB  #Chronic diarrhea   #History of cholecystectomy  #Surgical clips and bowel anastomosis in RLQ on CT abdomen   #Mild Pancreatic atrophy on CT abdomen  - Differentials for patient's chronic diarrhea include postcholecystectomy bile acid diarrhea, malabsorption disorder, thyroid problems, chronic pancreatic insufficiency. Fecal incontinence could be secondary to pelvic floor problems s/p hysterectomy. No infectious etiology identified. BRBPR is likely secondary to hemorrhoids but need to rule out other causes like diverticulosis, polyps, or CRC.   - No hx of colonoscopy per patient.   - Patient refused HEATH.  - EGD 2 years ago per patient for heart burn which was unremarkable.   - Leukocytosis improving 30.5k (lymphocyte predominant), Hb 10.7 (baseline 11), creat 1.2 (baseline 0.9), UA positive.   - CTAP showed surgical clips and anastomosis in RLQ, moderate right and mild left hydronephrosis and hydroureter to the level of the mid pelvis, splenomegaly up to 16 cm in length, mild pancreatic atrophy.   - C diff is negative. RVP negative. Stool culture negative so far. GI PCR negative. Anti-TTG IgA negative.  - Pelvic US: Unremarkable. Post hysterectomy.     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 Patient follows with Dr. Montgomery.   - Patient had episode of bleed which is unconfirmed if it is GI or vaginal.   - HD stable  - Hb stable at 11, hct 35.3  - iron studies suggestive of BENJAMÍN (low iron and %sat)    RECOMMENDATIONS:  - Follow up TSH, fecal elastase, fecal calprotectin, and fecal fat test  - check stool ova & parasites  - Recommend inpatient EGD / colonoscopy for BRBPR associated with diarrhea  - Clear liquid diet for now, golytely prep today, npo after MN  - Continue to monitor Hb  - Transfuse PRN to keep Hb > 7  - we will follow

## 2025-07-15 LAB — ELASTASE PANC STL-MCNT: 784 CD:794062645 — SIGNIFICANT CHANGE UP

## 2025-07-15 PROCEDURE — 99232 SBSQ HOSP IP/OBS MODERATE 35: CPT

## 2025-07-15 RX ORDER — BISACODYL 5 MG
20 TABLET, DELAYED RELEASE (ENTERIC COATED) ORAL ONCE
Refills: 0 | Status: COMPLETED | OUTPATIENT
Start: 2025-07-15 | End: 2025-07-15

## 2025-07-15 RX ORDER — POLYETHYLENE GLYCOL-3350 AND ELECTROLYTES 236; 6.74; 5.86; 2.97; 22.74 G/274.31G; G/274.31G; G/274.31G; G/274.31G; G/274.31G
4000 POWDER, FOR SOLUTION ORAL ONCE
Refills: 0 | Status: COMPLETED | OUTPATIENT
Start: 2025-07-15 | End: 2025-07-15

## 2025-07-15 RX ORDER — BISACODYL 5 MG
20 TABLET, DELAYED RELEASE (ENTERIC COATED) ORAL AT BEDTIME
Refills: 0 | Status: DISCONTINUED | OUTPATIENT
Start: 2025-07-15 | End: 2025-07-15

## 2025-07-15 RX ADMIN — TAMSULOSIN HYDROCHLORIDE 0.4 MILLIGRAM(S): 0.4 CAPSULE ORAL at 21:28

## 2025-07-15 RX ADMIN — Medication 40 MILLIGRAM(S): at 05:16

## 2025-07-15 RX ADMIN — Medication 20 MILLIGRAM(S): at 21:25

## 2025-07-15 RX ADMIN — Medication 1 APPLICATION(S): at 05:19

## 2025-07-15 RX ADMIN — Medication 40 MILLIGRAM(S): at 17:22

## 2025-07-15 RX ADMIN — POLYETHYLENE GLYCOL-3350 AND ELECTROLYTES 4000 MILLILITER(S): 236; 6.74; 5.86; 2.97; 22.74 POWDER, FOR SOLUTION ORAL at 14:21

## 2025-07-15 NOTE — CHART NOTE - NSCHARTNOTEFT_GEN_A_CORE
Patient was examined this morning. She is amenable for colonoscopy now and is agreeable to take Golytely. Patient was refusing bowel prep yesterday.     RECOMMENDATIONS:  - Start clear liquid diet and Golytely bowel prep 4 L today at 4 pm.  - Give dulcolax 20 mg at 10 pm tonight.  - NPO past midnight for EGD and colonoscopy tomorrow.

## 2025-07-15 NOTE — PROGRESS NOTE ADULT - SUBJECTIVE AND OBJECTIVE BOX
FELICE LOMBARDI 85y Female  MRN#: [id number]     Hospital Day: 7d    Pt is currently admitted with the primary diagnosis of  Urinary tract infection        SUBJECTIVE     Subjective complaints  Pt was evaluated this am. Patient has no complaints    Review of systems  ROS is negative                                            ----------------------------------------------------------    HOSPITAL COURSE      7/14 - Patient was seen and examined at bedside in the morning. no bloody bowel movements overnight. bleeding most likely hematuria  7/15 - Patient was seen and examined at bedside in the morning. Is now amendable for colonoscopy, will go tomorrow following completion of bowel prep                                             ----------------------------------------------------------     OBJECTIVE  PAST MEDICAL & SURGICAL HISTORY  Arthritis    Cervical cancer    No significant past surgical history                                              -----------------------------------------------------------  ALLERGIES:  ampicillin (Rash)  levofloxacin (Rash)                                            ------------------------------------------------------------    HOME MEDICATIONS  Home Medications:  Gemtesa 75 mg oral tablet: 1 tab(s) orally once a day (10 Jul 2025 14:59)                           MEDICATIONS:  STANDING MEDICATIONS  bisacodyl 20 milliGRAM(s) Oral at bedtime  chlorhexidine 2% Cloths 1 Application(s) Topical <User Schedule>  pantoprazole  Injectable 40 milliGRAM(s) IV Push two times a day  polyethylene glycol/electrolyte Solution. 4000 milliLiter(s) Oral once  tamsulosin 0.4 milliGRAM(s) Oral at bedtime    PRN MEDICATIONS  haloperidol    Injectable 5 milliGRAM(s) IntraMuscular once PRN  loperamide 2 milliGRAM(s) Oral four times a day PRN                                            ------------------------------------------------------------  VITAL SIGNS: Last 24 Hours  T(C): 35.9 (15 Jul 2025 08:43), Max: 36.7 (14 Jul 2025 16:31)  T(F): 96.7 (15 Jul 2025 08:43), Max: 98.1 (14 Jul 2025 16:31)  HR: 71 (15 Jul 2025 08:43) (65 - 71)  BP: 129/61 (15 Jul 2025 08:43) (106/60 - 129/61)  BP(mean): --  RR: 17 (15 Jul 2025 08:43) (17 - 18)  SpO2: 98% (15 Jul 2025 08:43) (97% - 99%)                                             --------------------------------------------------------------  LABS:                                                                    -------------------------------------------------------------  RADIOLOGY, TELEMETRY:                                            --------------------------------------------------------------    PHYSICAL EXAM:  GENERAL: NAD, lying in bed comfortably  HEAD:  Atraumatic, Normocephalic  CHEST/LUNG: Clear to auscultation bilaterally; No rales, rhonchi, wheezing, or rubs. Unlabored respirations  HEART: regular rate and rhythm; No murmurs, rubs, or gallops  ABDOMEN: Bowel sounds present; Soft, Nontender, Nondistended.    EXTREMITIES: Warm. No clubbing, cyanosis, or edema  NERVOUS SYSTEM:  Alert & Oriented X3. No focal deficits                                            --------------------------------------------------------------

## 2025-07-15 NOTE — PROGRESS NOTE ADULT - ASSESSMENT
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 Admitted for further management    4B course: Pt was treated with IVF. C diff neg. Seen by GI who recommneded OP colonoscopy. Pt was also treatwed with rocephin for positive UA. Seen by urology: OP cystoscopy. UCx Neg and BCx NGTD. Stable for DC.     #Chronic Diarrhea  -HD stable, appears cachectic  -No SIRS  -4-5 episodes/ day of soft watery stools. No association with lactose intake  - CTAP w/ iv contrast: 1. There is moderate right and mild left hydronephrosis and hydroureter   to the level of the mid pelvis. No ureteral stones are identified. A 2-3   hour delayed CT scan, allowing time for contrast to extend through the   ureters, may be helpful to further delineate the site of obstruction. (No   additional contrast is necessary.)  2. Splenomegaly (16 cm in length).  3. Surgical clips and anastomosis noted in the RLQ.  -No h/o NSAID or PPI use  -Cdiff neg, GI PCR neg  -Fu ESR CRP vit ADEK level, stool cx, Bcx, ova parasites, stool osm (secretory vs osmotic diarrhea), calprotectin, fat  - as outpatient with GI   -s/p IVF LR   - bicarb 17. started on po bicarb 650mg bid. repeat bicarb : 23. po bicarb dc'ed.   - loperamide prn  - GI reconsulted for possible GI bleed, less likely - Follow up TSH, fecal elastase, fecal leukocytes, fecal calprotectin, and fecal fat test.  - Pelvic u/s negative - post hysterectomy  - GI Cs noted: colonoscopy scheduled for tomorrow 7/15/25    #KHANH on CKD II(likely prerenal)- resolved  #Hypomagnesemia-repleted  -Cr downtrending     #Dysuria  #pyuria  #Hematuria, resolved  #Neurogenic bladder  #Chronic b/l hydro  - RN reports hematuria 7/11; resolved. H/H stable at 11, was previously 10.6  -UA positive for WBC and LE   - UCX neg  - BCX NGTD  -CTAP: B/l hydro, no stone  -Fu repeat CT for delayed contrast clearance  -Seen by urologist Dr. bonilla in [facility] who recomended bee  -Pt refusing bee in ED  -s/p aztreonam in ED, pt states she got a rash to ampicillin.  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 Montgomery OP   - not on any treatments per HIE     #Supportive Management:  Dispo:  Home vs SNF  DVT Ppx: heparin   Injectable 5000 Unit(s) SubCutaneous every 8 hours  GI Ppx: Diet:  Diet, Regular (07-09-25 @ 01:02) [Active

## 2025-07-15 NOTE — CHART NOTE - NSCHARTNOTEFT_GEN_A_CORE
Medicine Attending Attestation  Patient was seen and examined with medicine team.  Nursing records reviewed. I agree with the resident/PA/NP's note including past medical history, home medications, social history, allergies, surgical history, family history, and review of system. I have reviewed relevant vitals, laboratory values, imaging studies, and microbiology.   - Above resident's note was edited by me  - Colonoscopy/EDG postponed for tomorrow 7/16  - Bowel Prep today  - family updated at bedside  - rest of A/P as per detailed housestaff note above except above modifications    Total time spent to complete patient's bedside assessment, reviewed medical chart, discussed medical plan of care with team was 45 minutes with >50% of time spent face to face with patient, discussion with patient/family, and/or coordination of care, which exclude teaching time and/or separately reported services

## 2025-07-16 ENCOUNTER — TRANSCRIPTION ENCOUNTER (OUTPATIENT)
Age: 85
End: 2025-07-16

## 2025-07-16 ENCOUNTER — RESULT REVIEW (OUTPATIENT)
Age: 85
End: 2025-07-16

## 2025-07-16 LAB
A-TOCOPHEROL VIT E SERPL-MCNC: 12.2 MG/L — SIGNIFICANT CHANGE UP (ref 9–29)
ALBUMIN SERPL ELPH-MCNC: 3 G/DL — LOW (ref 3.5–5.2)
ALP SERPL-CCNC: 63 U/L — SIGNIFICANT CHANGE UP (ref 30–115)
ALT FLD-CCNC: 12 U/L — SIGNIFICANT CHANGE UP (ref 0–41)
ANION GAP SERPL CALC-SCNC: 14 MMOL/L — SIGNIFICANT CHANGE UP (ref 7–14)
APTT BLD: 29.9 SEC — SIGNIFICANT CHANGE UP (ref 27–39.2)
AST SERPL-CCNC: 17 U/L — SIGNIFICANT CHANGE UP (ref 0–41)
BASOPHILS # BLD AUTO: 0.04 K/UL — SIGNIFICANT CHANGE UP (ref 0–0.2)
BASOPHILS NFR BLD AUTO: 0.1 % — SIGNIFICANT CHANGE UP (ref 0–1)
BETA+GAMMA TOCOPHEROL SERPL-MCNC: 2.1 MG/L — SIGNIFICANT CHANGE UP (ref 0.5–4.9)
BILIRUB SERPL-MCNC: 0.2 MG/DL — SIGNIFICANT CHANGE UP (ref 0.2–1.2)
BUN SERPL-MCNC: 17 MG/DL — SIGNIFICANT CHANGE UP (ref 10–20)
CALCIUM SERPL-MCNC: 8.9 MG/DL — SIGNIFICANT CHANGE UP (ref 8.4–10.5)
CALPROTECTIN STL-MCNT: 26 UG/G — SIGNIFICANT CHANGE UP (ref 0–120)
CHLORIDE SERPL-SCNC: 111 MMOL/L — HIGH (ref 98–110)
CO2 SERPL-SCNC: 21 MMOL/L — SIGNIFICANT CHANGE UP (ref 17–32)
CREAT SERPL-MCNC: 1 MG/DL — SIGNIFICANT CHANGE UP (ref 0.7–1.5)
EGFR: 56 ML/MIN/1.73M2 — LOW
EGFR: 56 ML/MIN/1.73M2 — LOW
EOSINOPHIL # BLD AUTO: 0.09 K/UL — SIGNIFICANT CHANGE UP (ref 0–0.7)
EOSINOPHIL NFR BLD AUTO: 0.2 % — SIGNIFICANT CHANGE UP (ref 0–8)
GLUCOSE SERPL-MCNC: 79 MG/DL — SIGNIFICANT CHANGE UP (ref 70–99)
HCT VFR BLD CALC: 35.7 % — LOW (ref 37–47)
HGB BLD-MCNC: 11 G/DL — LOW (ref 12–16)
IMM GRANULOCYTES NFR BLD AUTO: 0.3 % — SIGNIFICANT CHANGE UP (ref 0.1–0.3)
INR BLD: 0.98 RATIO — SIGNIFICANT CHANGE UP (ref 0.65–1.3)
LACTOFERRIN STL-MCNC: <1 CD:794062635 — SIGNIFICANT CHANGE UP (ref 0–7.24)
LYMPHOCYTES # BLD AUTO: 37.44 K/UL — HIGH (ref 1.2–3.4)
LYMPHOCYTES # BLD AUTO: 85.7 % — HIGH (ref 20.5–51.1)
MAGNESIUM SERPL-MCNC: 1.6 MG/DL — LOW (ref 1.8–2.4)
MCHC RBC-ENTMCNC: 28.1 PG — SIGNIFICANT CHANGE UP (ref 27–31)
MCHC RBC-ENTMCNC: 30.8 G/DL — LOW (ref 32–37)
MCV RBC AUTO: 91.3 FL — SIGNIFICANT CHANGE UP (ref 81–99)
MONOCYTES # BLD AUTO: 1.8 K/UL — HIGH (ref 0.1–0.6)
MONOCYTES NFR BLD AUTO: 4.1 % — SIGNIFICANT CHANGE UP (ref 1.7–9.3)
NEUTROPHILS # BLD AUTO: 4.21 K/UL — SIGNIFICANT CHANGE UP (ref 1.4–6.5)
NEUTROPHILS NFR BLD AUTO: 9.6 % — LOW (ref 42.2–75.2)
NRBC BLD AUTO-RTO: 0 /100 WBCS — SIGNIFICANT CHANGE UP (ref 0–0)
PHOSPHATE SERPL-MCNC: 3.2 MG/DL — SIGNIFICANT CHANGE UP (ref 2.1–4.9)
PLATELET # BLD AUTO: 126 K/UL — LOW (ref 130–400)
PMV BLD: 10.7 FL — HIGH (ref 7.4–10.4)
POTASSIUM SERPL-MCNC: 3.7 MMOL/L — SIGNIFICANT CHANGE UP (ref 3.5–5)
POTASSIUM SERPL-SCNC: 3.7 MMOL/L — SIGNIFICANT CHANGE UP (ref 3.5–5)
PROT SERPL-MCNC: 4.8 G/DL — LOW (ref 6–8)
PROTHROM AB SERPL-ACNC: 11.5 SEC — SIGNIFICANT CHANGE UP (ref 9.95–12.87)
RBC # BLD: 3.91 M/UL — LOW (ref 4.2–5.4)
RBC # FLD: 15.7 % — HIGH (ref 11.5–14.5)
SODIUM SERPL-SCNC: 146 MMOL/L — SIGNIFICANT CHANGE UP (ref 135–146)
VIT A SERPL-MCNC: 23.5 UG/DL — SIGNIFICANT CHANGE UP (ref 22–69.5)
WBC # BLD: 43.7 K/UL — CRITICAL HIGH (ref 4.8–10.8)
WBC # FLD AUTO: 43.7 K/UL — CRITICAL HIGH (ref 4.8–10.8)

## 2025-07-16 PROCEDURE — 88305 TISSUE EXAM BY PATHOLOGIST: CPT | Mod: 26

## 2025-07-16 PROCEDURE — 43239 EGD BIOPSY SINGLE/MULTIPLE: CPT | Mod: XS

## 2025-07-16 PROCEDURE — 45385 COLONOSCOPY W/LESION REMOVAL: CPT

## 2025-07-16 PROCEDURE — 99232 SBSQ HOSP IP/OBS MODERATE 35: CPT

## 2025-07-16 PROCEDURE — 88312 SPECIAL STAINS GROUP 1: CPT | Mod: 26

## 2025-07-16 RX ORDER — MAGNESIUM SULFATE 500 MG/ML
2 SYRINGE (ML) INJECTION ONCE
Refills: 0 | Status: COMPLETED | OUTPATIENT
Start: 2025-07-16 | End: 2025-07-16

## 2025-07-16 RX ADMIN — Medication 40 MILLIGRAM(S): at 18:08

## 2025-07-16 RX ADMIN — Medication 25 GRAM(S): at 11:37

## 2025-07-16 RX ADMIN — TAMSULOSIN HYDROCHLORIDE 0.4 MILLIGRAM(S): 0.4 CAPSULE ORAL at 21:17

## 2025-07-16 RX ADMIN — Medication 1 APPLICATION(S): at 05:06

## 2025-07-16 NOTE — PROGRESS NOTE ADULT - ASSESSMENT
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 Admitted for further management  4B course: Pt was treated with IVF. C diff neg. Seen by GI who recommneded OP colonoscopy. Pt was also treatwed with rocephin for positive UA. Seen by urology: OP cystoscopy. UCx Neg and BCx NGTD.     #Chronic Diarrhea  -HD stable, appears cachectic  -No SIRS  -4-5 episodes/ day of soft watery stools. No association with lactose intake  - CTAP w/ iv contrast: 1. There is moderate right and mild left hydronephrosis and hydroureter   to the level of the mid pelvis. No ureteral stones are identified. A 2-3   hour delayed CT scan, allowing time for contrast to extend through the   ureters, may be helpful to further delineate the site of obstruction. (No   additional contrast is necessary.)  2. Splenomegaly (16 cm in length).  3. Surgical clips and anastomosis noted in the RLQ.  -No h/o NSAID or PPI use  -Cdiff neg, GI PCR neg  - loperamide prn  - GI reconsulted for possible GI bleed? though reportedly was actually hematuria?-which either way has resolved and hgb stbale  - Pelvic u/s negative - post hysterectomy  - colonoscopy scheduled for today, took golytely last night     #KHANH on CKD II(likely prerenal)- resolved  #Hypomagnesemia-repleted  -Cr downtrending     #Dysuria  #pyuria  #Hematuria, resolved  #Neurogenic bladder  #Chronic b/l hydro  - RN reports hematuria 7/11; resolved. H/H stable at 11, was previously 10.6  -UA positive for WBC and LE   - UCX neg  - BCX NGTD  -CTAP: B/l hydro, no stone  -Seen by urologist Dr. bonilla in [facility] who recomended bee  -Pt refusing bee   -s/p aztreonam in ED, pt states she got a rash to ampicillin.  -ID recs noted: dc rocephin  - c/w home gemtesa   - urology recs noted:  OP cystoscopy   - urine most likely cause of bleeding whcih has resolved-outpt f/u with urology     #Anemia  - iron studies noted  - dc with ferrous sulfate   - Hb around baseline     #suspected CLL   -WBC 48k prev around 40s  -Splenomegaly on CT  -Fish abnormal- 13q deletions  -Follows with dr. Montgomery OP   - not on any treatments per HIE     Pending: for colonoscopy today then dc planning

## 2025-07-16 NOTE — CHART NOTE - NSCHARTNOTEFT_GEN_A_CORE
PACU ANESTHESIA ADMISSION NOTE      Procedure:   Post op diagnosis:      ____  Intubated  TV:______       Rate: ______      FiO2: ______    _x___  Patent Airway    _x___  Full return of protective reflexes    _x___  Full recovery from anesthesia / back to baseline status    Vitals  SPO2:-97  HR:-60  RR:-11  B.P:-123/57  TEMp:-98    Mental Status:  _x___ Awake   ___x_ Alert   _____ Drowsy   _____ Sedated    Nausea/Vomiting:  _x___  NO       ______Yes,   See Post - Op Orders         Pain Scale (0-10):  __0___    Treatment: _x___ None    __x__ See Post - Op/PCA Orders    Post - Operative Fluids:   ___ Oral   ____x See Post - Op Orders    Plan: Discharge:   ____Home       ___x__Floor     _____Critical Care    _____  Other:_________________    Comments:  Report endorsed to RN in pacu  Vitals stable  No anesthesia issues or complications noted.  Discharge to patient to floor  when criteria met.

## 2025-07-16 NOTE — PROGRESS NOTE ADULT - SUBJECTIVE AND OBJECTIVE BOX
FELICE LOMBARDI 85y Female  MRN#: [id number]     Hospital Day: 8d    Pt is currently admitted with the primary diagnosis of  Urinary tract infection        SUBJECTIVE     Subjective complaints  Pt was evaluated this am. Patient has no complaints    Review of systems  ROS is negative                                            ----------------------------------------------------------    HOSPITAL COURSE      7/14 - Patient was seen and examined at bedside in the morning. no bloody bowel movements overnight. bleeding most likely hematuria  7/15 - Patient was seen and examined at bedside in the morning. Is now amendable for colonoscopy, will go tomorrow following completion of bowel prep  716 - Patient was seen and examined at bedside in the morning. Pt will go for colonoscopy today after the family convinced her, will dc tomorrow.                                              ----------------------------------------------------------     OBJECTIVE  PAST MEDICAL & SURGICAL HISTORY  Arthritis    Cervical cancer    No significant past surgical history                                              -----------------------------------------------------------  ALLERGIES:  ampicillin (Rash)  levofloxacin (Rash)                                            ------------------------------------------------------------    HOME MEDICATIONS  Home Medications:  Gemtesa 75 mg oral tablet: 1 tab(s) orally once a day (10 Jul 2025 14:59)                           MEDICATIONS:  STANDING MEDICATIONS  chlorhexidine 2% Cloths 1 Application(s) Topical <User Schedule>  pantoprazole  Injectable 40 milliGRAM(s) IV Push two times a day  tamsulosin 0.4 milliGRAM(s) Oral at bedtime    PRN MEDICATIONS  loperamide 2 milliGRAM(s) Oral four times a day PRN                                            ------------------------------------------------------------  VITAL SIGNS: Last 24 Hours  T(C): 36.3 (16 Jul 2025 08:37), Max: 36.6 (15 Jul 2025 15:57)  T(F): 97.4 (16 Jul 2025 08:37), Max: 97.8 (15 Jul 2025 15:57)  HR: 72 (16 Jul 2025 08:37) (60 - 72)  BP: 123/65 (16 Jul 2025 08:37) (123/65 - 143/74)  BP(mean): --  RR: 17 (16 Jul 2025 08:37) (17 - 18)  SpO2: 100% (16 Jul 2025 08:37) (99% - 100%)                                             --------------------------------------------------------------  LABS:                        11.0   43.70 )-----------( 126      ( 16 Jul 2025 05:31 )             35.7     07-16    146  |  111[H]  |  17  ----------------------------<  79  3.7   |  21  |  1.0    Ca    8.9      16 Jul 2025 05:31  Phos  3.2     07-16  Mg     1.6     07-16    TPro  4.8[L]  /  Alb  3.0[L]  /  TBili  0.2  /  DBili  x   /  AST  17  /  ALT  12  /  AlkPhos  63  07-16    PT/INR - ( 16 Jul 2025 05:31 )   PT: 11.50 sec;   INR: 0.98 ratio         PTT - ( 16 Jul 2025 05:31 )  PTT:29.9 sec  Urinalysis Basic - ( 16 Jul 2025 05:31 )    Color: x / Appearance: x / SG: x / pH: x  Gluc: 79 mg/dL / Ketone: x  / Bili: x / Urobili: x   Blood: x / Protein: x / Nitrite: x   Leuk Esterase: x / RBC: x / WBC x   Sq Epi: x / Non Sq Epi: x / Bacteria: x                                                            -------------------------------------------------------------  RADIOLOGY, TELEMETRY:                                            --------------------------------------------------------------    PHYSICAL EXAM:  GENERAL: NAD, lying in bed comfortably  HEAD:  Atraumatic, Normocephalic  CHEST/LUNG: Clear to auscultation bilaterally; No rales, rhonchi, wheezing, or rubs. Unlabored respirations  HEART: regular rate and rhythm; No murmurs, rubs, or gallops  ABDOMEN: Bowel sounds present; Soft, Nontender, Nondistended.    EXTREMITIES: Warm. No clubbing, cyanosis, or edema  NERVOUS SYSTEM:  Alert & Oriented X3. No focal deficits                                            --------------------------------------------------------------

## 2025-07-16 NOTE — PROGRESS NOTE ADULT - ASSESSMENT
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 Admitted for further management    4B course: Pt was treated with IVF. C diff neg. Seen by GI who recommneded OP colonoscopy. Pt was also treatwed with rocephin for positive UA. Seen by urology: OP cystoscopy. UCx Neg and BCx NGTD. Stable for DC.     #Chronic Diarrhea  -HD stable, appears cachectic  -No SIRS  -4-5 episodes/ day of soft watery stools. No association with lactose intake  - CTAP w/ iv contrast: 1. There is moderate right and mild left hydronephrosis and hydroureter   to the level of the mid pelvis. No ureteral stones are identified. A 2-3   hour delayed CT scan, allowing time for contrast to extend through the   ureters, may be helpful to further delineate the site of obstruction. (No   additional contrast is necessary.)  2. Splenomegaly (16 cm in length).  3. Surgical clips and anastomosis noted in the RLQ.  -No h/o NSAID or PPI use  -Cdiff neg, GI PCR neg  -Fu ESR CRP vit ADEK level, stool cx, Bcx, ova parasites, stool osm (secretory vs osmotic diarrhea), calprotectin, fat  - as outpatient with GI   -s/p IVF LR   - bicarb 17. started on po bicarb 650mg bid. repeat bicarb : 23. po bicarb dc'ed.   - loperamide prn  - GI reconsulted for possible GI bleed, less likely - Follow up TSH, fecal elastase, fecal leukocytes, fecal calprotectin, and fecal fat test.  - Pelvic u/s negative - post hysterectomy  - GI Cs noted: colonoscopy scheduled for 7/16/25    #KHANH on CKD II(likely prerenal)- resolved  #Hypomagnesemia-repleted  -Cr downtrending     #Dysuria  #pyuria  #Hematuria, resolved  #Neurogenic bladder  #Chronic b/l hydro  - RN reports hematuria 7/11; resolved. H/H stable at 11, was previously 10.6  -UA positive for WBC and LE   - UCX neg  - BCX NGTD  -CTAP: B/l hydro, no stone  -Fu repeat CT for delayed contrast clearance  -Seen by urologist Dr. bonilla in [facility] who recomended bee  -Pt refusing bee in ED  -s/p aztreonam in ED, pt states she got a rash to ampicillin.  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 Montgomery OP   - not on any treatments per HIE     #Supportive Management:  Dispo:  Home vs SNF 24 - 48hrs  DVT Ppx: heparin   Injectable 5000 Unit(s) SubCutaneous every 8 hours  GI Ppx: Diet:  Diet, Regular (07-09-25 @ 01:02) [Active

## 2025-07-16 NOTE — PROGRESS NOTE ADULT - SUBJECTIVE AND OBJECTIVE BOX
FELICE LOMBARDI 85y Female  MRN#: [id number]     Hospital Day: 8d      SUBJECTIVE  No acute events overnight, pt seen and examined this morning.                                          ----------------------------------------------------------  OBJECTIVE  PAST MEDICAL & SURGICAL HISTORY  Arthritis    Cervical cancer    No significant past surgical history                                              -----------------------------------------------------------  ALLERGIES:  ampicillin (Rash)  levofloxacin (Rash)                                            ------------------------------------------------------------    HOME MEDICATIONS  Home Medications:  Gemtesa 75 mg oral tablet: 1 tab(s) orally once a day (10 Jul 2025 14:59)                           MEDICATIONS:  STANDING MEDICATIONS  chlorhexidine 2% Cloths 1 Application(s) Topical <User Schedule>  pantoprazole  Injectable 40 milliGRAM(s) IV Push two times a day  tamsulosin 0.4 milliGRAM(s) Oral at bedtime    PRN MEDICATIONS  loperamide 2 milliGRAM(s) Oral four times a day PRN                                            ------------------------------------------------------------  VITAL SIGNS: Last 24 Hours  T(C): 36.3 (16 Jul 2025 08:37), Max: 36.6 (15 Jul 2025 15:57)  T(F): 97.4 (16 Jul 2025 08:37), Max: 97.8 (15 Jul 2025 15:57)  HR: 72 (16 Jul 2025 08:37) (60 - 72)  BP: 123/65 (16 Jul 2025 08:37) (123/65 - 143/74)  BP(mean): --  RR: 17 (16 Jul 2025 08:37) (17 - 18)  SpO2: 100% (16 Jul 2025 08:37) (99% - 100%)                                             --------------------------------------------------------------  LABS:                        11.0   43.70 )-----------( 126      ( 16 Jul 2025 05:31 )             35.7     07-16    146  |  111[H]  |  17  ----------------------------<  79  3.7   |  21  |  1.0    Ca    8.9      16 Jul 2025 05:31  Phos  3.2     07-16  Mg     1.6     07-16    TPro  4.8[L]  /  Alb  3.0[L]  /  TBili  0.2  /  DBili  x   /  AST  17  /  ALT  12  /  AlkPhos  63  07-16    PT/INR - ( 16 Jul 2025 05:31 )   PT: 11.50 sec;   INR: 0.98 ratio         PTT - ( 16 Jul 2025 05:31 )  PTT:29.9 sec  Urinalysis Basic - ( 16 Jul 2025 05:31 )    Color: x / Appearance: x / SG: x / pH: x  Gluc: 79 mg/dL / Ketone: x  / Bili: x / Urobili: x   Blood: x / Protein: x / Nitrite: x   Leuk Esterase: x / RBC: x / WBC x   Sq Epi: x / Non Sq Epi: x / Bacteria: x                                                            -------------------------------------------------------------  RADIOLOGY:  CXR      CT                                            --------------------------------------------------------------    PHYSICAL EXAM:  GENERAL: NAD, lying in bed comfortably  CHEST/LUNG: Clear to auscultation bilaterally; No rales, rhonchi, wheezing, or rubs. Unlabored respirations  HEART: Regular rate and rhythm; No murmurs, rubs, or gallops  ABDOMEN: Soft, nontender, nondistended  EXTREMITIES:  No clubbing, cyanosis, or edema  NERVOUS SYSTEM:  A&Ox3

## 2025-07-17 PROCEDURE — 99232 SBSQ HOSP IP/OBS MODERATE 35: CPT

## 2025-07-17 PROCEDURE — 99233 SBSQ HOSP IP/OBS HIGH 50: CPT

## 2025-07-17 RX ADMIN — TAMSULOSIN HYDROCHLORIDE 0.4 MILLIGRAM(S): 0.4 CAPSULE ORAL at 21:08

## 2025-07-17 RX ADMIN — Medication 40 MILLIGRAM(S): at 05:11

## 2025-07-17 RX ADMIN — LOPERAMIDE HCL 2 MILLIGRAM(S): 1 SOLUTION ORAL at 13:35

## 2025-07-17 RX ADMIN — Medication 1 APPLICATION(S): at 05:11

## 2025-07-17 NOTE — PROGRESS NOTE ADULT - ASSESSMENT
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   GI was initially consulted for chronic diarrhea and evaluation colonoscopy to rule out collagenous colitis. Patient was recommended outpatient colonoscopy as her diarrhea resolved. GI is reconsulted for GI bleed and evaluation for colonoscopy.     #Concern of GIB  #Chronic diarrhea   #History of cholecystectomy  #Surgical clips and bowel anastomosis in RLQ on CT abdomen   #Mild Pancreatic atrophy on CT abdomen  - Differentials for patient's chronic diarrhea include postcholecystectomy bile acid diarrhea, malabsorption disorder, thyroid problems, chronic pancreatic insufficiency. Fecal incontinence could be secondary to pelvic floor problems s/p hysterectomy. No infectious etiology identified. BRBPR is likely secondary to hemorrhoids but need to rule out other causes like diverticulosis, polyps, or CRC.   - No hx of colonoscopy per patient.   - Patient refused HEATH.  - EGD 2 years ago per patient for heart burn which was unremarkable.   - Leukocytosis improving 30.5k (lymphocyte predominant), Hb 10.7 (baseline 11), creat 1.2 (baseline 0.9), UA positive.   - CTAP showed surgical clips and anastomosis in RLQ, moderate right and mild left hydronephrosis and hydroureter to the level of the mid pelvis, splenomegaly up to 16 cm in length, mild pancreatic atrophy.   - C diff is negative. RVP negative. Stool culture negative so far. GI PCR negative. Anti-TTG IgA negative.  - Pelvic US: Unremarkable. Post hysterectomy.     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 Patient follows with Dr. Montgomery.   - Patient had episode of bleed which is unconfirmed if it is GI or vaginal.   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 GI was initially consulted for chronic diarrhea and evaluation colonoscopy to rule out collagenous colitis. Initial plan was for outpatient colonoscopy however GI is reconsulted for GI bleed and evaluation for colonoscopy.     #Chronic watery diarrhea   #Concern of GIB, resolved  #History of cholecystectomy  #Hemicolectomy? Surgical clips and bowel anastomosis in RLQ on CT abdomen, patient unable to recall why.  #Mild Pancreatic atrophy on CT abdomen  #Normocytic anemia, multifactorial in the setting of BENJAMÍN and CLL.   - Differentials for patient's chronic diarrhea include postcholecystectomy bile acid diarrhea, post hemicolectomy diarrhea, chronic pancreatic insufficiency. collagenous colitis, microscopic colitis, SIBO. Fecal incontinence could be secondary to pelvic floor problems s/p hysterectomy. No infectious etiology identified. BRBPR is likely secondary to hemorrhoids in nature after colonoscopy ruled out lesions, diverticulosis, AVM, dieufaloy lesions.   - No hx of colonoscopy per patient piror this admission  - Patient refused HEATH.  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 Patient follows with Dr. Montgomery.   - Patient had episode of bleed which is unconfirmed if it is GI or vaginal.   - HD stable  - Hb stable at 11, hct 35.3  - lactoferrin < 1; calprotectin 26 (n).   - Pancreatic elastase 784 (wnl)  - TSH and ft4 normal.   - EGD 2 years ago per patient for heart burn which was unremarkable.   - Leukocytosis improving 30.5k (lymphocyte predominant), Hb 10.7 (baseline 11), creat 1.2 (baseline 0.9), UA positive.   - CTAP showed surgical clips and anastomosis in RLQ, moderate right and mild left hydronephrosis and hydroureter to the level of the mid pelvis, splenomegaly up to 16 cm in length, mild pancreatic atrophy.   - C diff is negative. RVP negative. Stool culture negative so far. GI PCR negative. Anti-TTG IgA negative.  - Pelvic US: Unremarkable. Post hysterectomy.   - 7/16 EGD Impressions:  	Normal mucosa in the whole esophagus.  	Erythema in the stomach compatible with non-erosive gastritis. (Biopsy).  	Normal mucosa in the whole examined duodenum. (Biopsy).  - 7/16: Colonoscopy Impressions:  	Previous Surgery in the anastomosis.  	Polyp (7 mm) in the rectum. (Polypectomy).  	Polyp (8 mm) in the sigmoid colon. (Polypectomy).  	Polyp (10 mm) in the colon. (Polypectomy).  	The colon and TI were otherwise unremarkable (Biopsy).      RECOMMENDATIONS:  - If diarrhea persists, can consider trial of cholestyramine. Dosage typically starts at 4-8 grams per day (1 packet or scoopful once or twice a day), with maintenance doses of 8-16 grams per day.   - Can also consider Imodium for symptomatic control of her diarrhea; 4mg (2 capsules), followed by 2mg (one capsule) after each unformed stool, not exceeding 16mg per day.   - Continue to monitor Hb  - Transfuse PRN to keep Hb > 7  - Patient will need to follow up with GI outpatient to repeat her colonoscopy in 6 months, if aligns with her GOC.     Case discussed with Dr. Leora Gray MD  PGY-4 Gastroenterology Fellow  [facility]

## 2025-07-17 NOTE — DIETITIAN INITIAL EVALUATION ADULT - PERTINENT MEDS FT
MEDICATIONS  (STANDING):  chlorhexidine 2% Cloths 1 Application(s) Topical <User Schedule>  pantoprazole  Injectable 40 milliGRAM(s) IV Push two times a day  tamsulosin 0.4 milliGRAM(s) Oral at bedtime    MEDICATIONS  (PRN):  loperamide 2 milliGRAM(s) Oral four times a day PRN Diarrhea

## 2025-07-17 NOTE — DIETITIAN INITIAL EVALUATION ADULT - ORAL INTAKE PTA/DIET HISTORY
Pt is questionable historian. Per sister, pt has very robust appetite, and continuously consuming meals. Sister reports pt needing to have BM within minutes of consuming anything. Sister is concerned for disordered eating. Family reports UBW 150lbs, CBW 104lbs. Family reports wt loss x 6 mths, when diarrhea episodes began.  Pt is questionable historian. Per sister, pt has very robust appetite, and continuously consuming meals. No food allergies, or chewing/swallowing issues reported.  Sister reports pt needing to have BM within minutes of consuming anything. Sister is concerned for disordered eating. Family reports UBW 150lbs, CBW 104lbs. Family reports wt loss x 6 mths, when diarrhea episodes began.   Prev Admission wt:   Jan 2025 ---> 53.2kg  Despite family reports, pt is presenting with -2.4% wt loss x 7 mths, which is nonsignificant loss for timeframe.

## 2025-07-17 NOTE — DIETITIAN INITIAL EVALUATION ADULT - LAB (SPECIFY)
Cl 111 H  PRO 4.8 L  Alb 3.0 L Cl 111 H  PRO 4.8 L  Alb 3.0 L  eGFR 56 L  Low K has been Resolved Cl 111 H, Mag 1.6 L  PRO 4.8 L  Alb 3.0 L  eGFR 56 L  Low K has been Resolved

## 2025-07-17 NOTE — DIETITIAN INITIAL EVALUATION ADULT - OTHER INFO
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 GI was initially consulted for chronic diarrhea and evaluation colonoscopy to rule out collagenous colitis. Initial plan was for outpatient colonoscopy however GI is reconsulted for GI bleed and evaluation for colonoscopy.    #Chronic watery diarrhea  #Concern of GIB, resolved #History of cholecystectomy #Hemicolectomy? Surgical clips and bowel anastomosis in RLQ on CT abdomen, patient unable to recall why. #Mild Pancreatic atrophy on CT abdomen #Normocytic anemia, multifactorial in the setting of BENJAMÍN and CLL.  - Differentials for patient's chronic diarrhea include postcholecystectomy bile acid diarrhea, post hemicolectomy diarrhea, chronic pancreatic insufficiency. collagenous colitis, microscopic colitis, SIBO. Fecal incontinence could be secondary to pelvic floor problems s/p hysterectomy. No infectious etiology identified. BRBPR is likely secondary to hemorrhoids in nature after colonoscopy ruled out lesions, diverticulosis, AVM, dieufaloy lesions.  - EGD 2 years ago per patient for heart burn which was unremarkable.  - C diff is negative. RVP negative. Stool culture negative so far. GI PCR negative. Anti-TTG IgA negative. - Pelvic US: Unremarkable. Post hysterectomy.  - 7/16 EGD Impressions:   Normal mucosa in the whole esophagus.   Erythema in the stomach compatible with non-erosive gastritis. (Biopsy).   Normal mucosa in the whole examined duodenum. (Biopsy). - 7/16: Colonoscopy Impressions:   Previous Surgery in the anastomosis.   Polyp (7 mm) in the rectum. (Polypectomy).   Polyp (8 mm) in the sigmoid colon. (Polypectomy).   Polyp (10 mm) in the colon. (Polypectomy).   The colon and TI were otherwise unremarkable (Biopsy).

## 2025-07-17 NOTE — PROGRESS NOTE ADULT - SUBJECTIVE AND OBJECTIVE BOX
FELICE LOMBARDI 85y Female  MRN#: [id number]     Hospital Day: 9d    Pt is currently admitted with the primary diagnosis of  Urinary tract infection        SUBJECTIVE     Subjective complaints  Pt was evaluated this am. Patient has no complaints    Review of systems  ROS is negative                                            ----------------------------------------------------------    HOSPITAL COURSE      7/14 - Patient was seen and examined at bedside in the morning. no bloody bowel movements overnight. bleeding most likely hematuria  7/15 - Patient was seen and examined at bedside in the morning. Is now amendable for colonoscopy, will go tomorrow following completion of bowel prep  716 - Patient was seen and examined at bedside in the morning. Pt will go for colonoscopy today after the family convinced her, will dc tomorrow.   7/17 - Patient was seen and examined at bedside in the morning. Anticipating dc soon after PT, but patient is refusing PT, [place] completed yesterday                                             ----------------------------------------------------------     OBJECTIVE  PAST MEDICAL & SURGICAL HISTORY  Arthritis    Cervical cancer    No significant past surgical history                                              -----------------------------------------------------------  ALLERGIES:  ampicillin (Rash)  levofloxacin (Rash)                                            ------------------------------------------------------------    HOME MEDICATIONS  Home Medications:  Gemtesa 75 mg oral tablet: 1 tab(s) orally once a day (10 Jul 2025 14:59)                           MEDICATIONS:  STANDING MEDICATIONS  chlorhexidine 2% Cloths 1 Application(s) Topical <User Schedule>  pantoprazole  Injectable 40 milliGRAM(s) IV Push two times a day  tamsulosin 0.4 milliGRAM(s) Oral at bedtime    PRN MEDICATIONS  loperamide 2 milliGRAM(s) Oral four times a day PRN                                            ------------------------------------------------------------  VITAL SIGNS: Last 24 Hours  T(C): 36.8 (17 Jul 2025 08:28), Max: 36.8 (17 Jul 2025 00:00)  T(F): 98.2 (17 Jul 2025 08:28), Max: 98.2 (17 Jul 2025 00:00)  HR: 58 (17 Jul 2025 08:28) (53 - 78)  BP: 126/69 (17 Jul 2025 08:28) (123/57 - 151/63)  BP(mean): 88 (17 Jul 2025 08:28) (88 - 88)  RR: 18 (17 Jul 2025 08:28) (16 - 18)  SpO2: 97% (17 Jul 2025 08:28) (95% - 98%)                                             --------------------------------------------------------------  LABS:                        11.0   43.70 )-----------( 126      ( 16 Jul 2025 05:31 )             35.7     07-16    146  |  111[H]  |  17  ----------------------------<  79  3.7   |  21  |  1.0    Ca    8.9      16 Jul 2025 05:31  Phos  3.2     07-16  Mg     1.6     07-16    TPro  4.8[L]  /  Alb  3.0[L]  /  TBili  0.2  /  DBili  x   /  AST  17  /  ALT  12  /  AlkPhos  63  07-16    PT/INR - ( 16 Jul 2025 05:31 )   PT: 11.50 sec;   INR: 0.98 ratio         PTT - ( 16 Jul 2025 05:31 )  PTT:29.9 sec  Urinalysis Basic - ( 16 Jul 2025 05:31 )    Color: x / Appearance: x / SG: x / pH: x  Gluc: 79 mg/dL / Ketone: x  / Bili: x / Urobili: x   Blood: x / Protein: x / Nitrite: x   Leuk Esterase: x / RBC: x / WBC x   Sq Epi: x / Non Sq Epi: x / Bacteria: x                                                            -------------------------------------------------------------  RADIOLOGY, TELEMETRY:                                            --------------------------------------------------------------    PHYSICAL EXAM:  GENERAL: NAD, lying in bed comfortably  HEAD:  Atraumatic, Normocephalic  CHEST/LUNG: Clear to auscultation bilaterally; No rales, rhonchi, wheezing, or rubs. Unlabored respirations  HEART: regular rate and rhythm; No murmurs, rubs, or gallops  ABDOMEN: Bowel sounds present; Soft, Nontender, Nondistended.    EXTREMITIES: Warm. No clubbing, cyanosis, or edema  NERVOUS SYSTEM:  Alert & Oriented X3. No focal deficits                                            --------------------------------------------------------------

## 2025-07-17 NOTE — DIETITIAN INITIAL EVALUATION ADULT - ENERGY INTAKE
0% PO intake x 2 days r/t NPO status  100% PO intake at lunch, following diet advancement earlier today.  Adequate (%)

## 2025-07-17 NOTE — DIETITIAN INITIAL EVALUATION ADULT - ADD RECOMMEND
Interventions:  1.) Recommend Banatrol x 3 daily    Monitor/Eval:  1.) Monitor PO intake.   2.) Monitor GI Distress.   3.) Monitor weight stability.     RD TO FOLLOW AS MODERATE RISK.  Interventions:  1.) Recommend Banatrol x 2 daily for 1 day  2.) Replete K and Mag PRN 2/2 diarrhea    Monitor/Eval:  1.) Monitor PO intake.   2.) Monitor GI Distress.   3.) Monitor weight stability.     RD TO FOLLOW AS MODERATE RISK.

## 2025-07-17 NOTE — PROGRESS NOTE ADULT - SUBJECTIVE AND OBJECTIVE BOX
Chief Complaint:  Patient is a 85y old  Female who presents with a chief complaint of Diarrhea (11 Jul 2025 10:07)    HPI/ 24 hr events:   RN reports patient with bloody running down her legs after BM. Vitals stable. Exam benign. blood and stool on the floor. Unclear if is vaginal vs GI. Currently on PPI. GI reconsulted for GIB.     Patient seen and examined at bedside. Patient reported 3 BM yesterday, which were loose and watery and 2 BM today too. She reported seeing blood yesterday and some today and only sees it when she strains. Denies nausea, vomiting, abdominal pain, hematemesis. Vitals are overall stable, persistent leukocytosis, Hgb stable, LFTs stable.     REVIEW OF SYSTEMS:   Negative except for HPI    MEDICATIONS:   MEDICATIONS  (STANDING):  chlorhexidine 2% Cloths 1 Application(s) Topical <User Schedule>  pantoprazole  Injectable 40 milliGRAM(s) IV Push two times a day  tamsulosin 0.4 milliGRAM(s) Oral at bedtime    MEDICATIONS  (PRN):  haloperidol    Injectable 5 milliGRAM(s) IntraMuscular once PRN agitation  loperamide 2 milliGRAM(s) Oral four times a day PRN Diarrhea    DIET:  Diet, Regular (07-09-25 @ 01:02) [Active]      ALLERGIES:   Allergies    ampicillin (Rash)  levofloxacin (Rash)    Intolerances    VITAL SIGNS:   Vital Signs Last 24 Hrs  T(C): 36.7 (14 Jul 2025 08:00), Max: 37.1 (14 Jul 2025 00:00)  T(F): 98 (14 Jul 2025 08:00), Max: 98.8 (14 Jul 2025 00:00)  HR: 80 (14 Jul 2025 08:00) (69 - 80)  BP: 130/54 (14 Jul 2025 08:00) (107/66 - 130/54)  BP(mean): --  RR: 18 (14 Jul 2025 08:00) (18 - 18)  SpO2: 99% (14 Jul 2025 08:00) (99% - 99%)    Parameters below as of 14 Jul 2025 08:00  Patient On (Oxygen Delivery Method): room air      I&O's Summary      PHYSICAL EXAM:   GENERAL:  No acute distress  HEENT:  NC/AT, conjunctiva clear, sclera anicteric  CHEST:  No increased effort  HEART:  Regular rate  ABDOMEN:  Soft, non-tender, non-distended, normoactive bowel sounds, no rebound or guarding  EXTREMITIES: No edema  SKIN:  Warm, dry  NEURO:  Calm, cooperative    LABS:                        11.0   41.37 )-----------( 116      ( 13 Jul 2025 11:47 )             35.3     Hemoglobin: 11.0 g/dL (07-13-25 @ 11:47)  Hemoglobin: 11.0 g/dL (07-11-25 @ 19:45)    07-13    142  |  109  |  19  ----------------------------<  78  3.3[L]   |  21  |  1.0    Ca    8.9      13 Jul 2025 11:47      PTT - ( 13 Jul 2025 11:47 )  PTT:31.3 sec    Culture - Acid Fast - Stool w/Smear (collected 11 Jul 2025 10:05)  Source: Stool Stool  Preliminary Report (12 Jul 2025 23:07):    Culture is being performed.    GI PCR Panel: Maishatedavid (07-11-25 @ 12:47)   Chief Complaint:  Patient is a 85y old  Female who presents with a chief complaint of Diarrhea (11 Jul 2025 10:07)    HPI/ 24 hr events:   patient is s/p colonoscopy and EGD yesterday. No events overnight. Reported 2-3 water diarrhea episodes since the procedures. Tolerating food without issues. Denies nausea, vomiting, abdominal pain, hematemesis. Vitals are overall stable, persistent leukocytosis, Hgb stable, LFTs stable.       REVIEW OF SYSTEMS:   Negative except for HPI    MEDICATIONS:   MEDICATIONS  (STANDING):  chlorhexidine 2% Cloths 1 Application(s) Topical <User Schedule>  pantoprazole  Injectable 40 milliGRAM(s) IV Push two times a day  tamsulosin 0.4 milliGRAM(s) Oral at bedtime    MEDICATIONS  (PRN):  haloperidol    Injectable 5 milliGRAM(s) IntraMuscular once PRN agitation  loperamide 2 milliGRAM(s) Oral four times a day PRN Diarrhea    DIET:  Diet, Regular (07-09-25 @ 01:02) [Active]      ALLERGIES:   Allergies    ampicillin (Rash)  levofloxacin (Rash)    Intolerances    VITAL SIGNS:   Vital Signs Last 24 Hrs  T(C): 36.7 (14 Jul 2025 08:00), Max: 37.1 (14 Jul 2025 00:00)  T(F): 98 (14 Jul 2025 08:00), Max: 98.8 (14 Jul 2025 00:00)  HR: 80 (14 Jul 2025 08:00) (69 - 80)  BP: 130/54 (14 Jul 2025 08:00) (107/66 - 130/54)  BP(mean): --  RR: 18 (14 Jul 2025 08:00) (18 - 18)  SpO2: 99% (14 Jul 2025 08:00) (99% - 99%)    Parameters below as of 14 Jul 2025 08:00  Patient On (Oxygen Delivery Method): room air      I&O's Summary      PHYSICAL EXAM:   GENERAL:  No acute distress  HEENT:  NC/AT, conjunctiva clear, sclera anicteric  CHEST:  No increased effort  HEART:  Regular rate  ABDOMEN:  Soft, non-tender, non-distended, normoactive bowel sounds, no rebound or guarding  EXTREMITIES: No edema  SKIN:  Warm, dry  NEURO:  Calm, cooperative    LABS:                        11.0   41.37 )-----------( 116      ( 13 Jul 2025 11:47 )             35.3     Hemoglobin: 11.0 g/dL (07-13-25 @ 11:47)  Hemoglobin: 11.0 g/dL (07-11-25 @ 19:45)    07-13    142  |  109  |  19  ----------------------------<  78  3.3[L]   |  21  |  1.0    Ca    8.9      13 Jul 2025 11:47      PTT - ( 13 Jul 2025 11:47 )  PTT:31.3 sec    Culture - Acid Fast - Stool w/Smear (collected 11 Jul 2025 10:05)  Source: Stool Stool  Preliminary Report (12 Jul 2025 23:07):    Culture is being performed.    GI PCR Panel: Bertha (07-11-25 @ 12:47)

## 2025-07-17 NOTE — PROGRESS NOTE ADULT - ASSESSMENT
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 Admitted for further management    4B course: Pt was treated with IVF. C diff neg. Seen by GI who recommneded OP colonoscopy. Pt was also treatwed with rocephin for positive UA. Seen by urology: OP cystoscopy. UCx Neg and BCx NGTD. Stable for DC.     #Chronic Diarrhea  -HD stable, appears cachectic  -No SIRS  -4-5 episodes/ day of soft watery stools. No association with lactose intake  - CTAP w/ iv contrast: 1. There is moderate right and mild left hydronephrosis and hydroureter   to the level of the mid pelvis. No ureteral stones are identified. A 2-3   hour delayed CT scan, allowing time for contrast to extend through the   ureters, may be helpful to further delineate the site of obstruction. (No   additional contrast is necessary.)  2. Splenomegaly (16 cm in length).  3. Surgical clips and anastomosis noted in the RLQ.  -No h/o NSAID or PPI use  -Cdiff neg, GI PCR neg  -Fu ESR CRP vit ADEK level, stool cx, Bcx, ova parasites, stool osm (secretory vs osmotic diarrhea), calprotectin, fat  - as outpatient with GI   -s/p IVF LR   - bicarb 17. started on po bicarb 650mg bid. repeat bicarb : 23. po bicarb dc'ed.   - loperamide prn  - GI reconsulted for possible GI bleed, less likely - Follow up TSH, fecal elastase, fecal leukocytes, fecal calprotectin, and fecal fat test.  - Pelvic u/s negative - post hysterectomy  - GI Cs noted: colonoscopy completed 7/16/25  - If diarrhea persists, can consider trial of cholestyramine. Dosage typically starts at 4-8 grams per day (1 packet or scoopful once or twice a day), with maintenance doses of 8-16 grams per day.   - Can also consider Imodium for symptomatic control of her diarrhea; 4mg (2 capsules), followed by 2mg (one capsule) after each unformed stool, not exceeding 16mg per day.   - Continue to monitor Hb  - Transfuse PRN to keep Hb > 7  - Patient will need to follow up with GI outpatient to repeat her colonoscopy in 6 months, if aligns with her GOC.     #KHANH on CKD II(likely prerenal)- resolved  #Hypomagnesemia-repleted  -Cr downtrending     #Dysuria  #pyuria  #Hematuria, resolved  #Neurogenic bladder  #Chronic b/l hydro  - RN reports hematuria 7/11; resolved. H/H stable at 11, was previously 10.6  -UA positive for WBC and LE   - UCX neg  - BCX NGTD  -CTAP: B/l hydro, no stone  -Fu repeat CT for delayed contrast clearance  -Seen by urologist Dr. bonilla in [facility] who recomended bee  -Pt refusing bee in ED  -s/p aztreonam in ED, pt states she got a rash to ampicillin.  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 Montgomery OP   - not on any treatments per HIE     #Supportive Management:  Dispo:  Home vs SNF 24 - 48hrs  DVT Ppx: heparin   Injectable 5000 Unit(s) SubCutaneous every 8 hours  GI Ppx: PPI  Diet - Clears

## 2025-07-17 NOTE — DIETITIAN INITIAL EVALUATION ADULT - PERTINENT LABORATORY DATA
07-16    146  |  111[H]  |  17  ----------------------------<  79  3.7   |  21  |  1.0    Ca    8.9      16 Jul 2025 05:31  Phos  3.2     07-16  Mg     1.6     07-16    TPro  4.8[L]  /  Alb  3.0[L]  /  TBili  0.2  /  DBili  x   /  AST  17  /  ALT  12  /  AlkPhos  63  07-16

## 2025-07-18 ENCOUNTER — TRANSCRIPTION ENCOUNTER (OUTPATIENT)
Age: 85
End: 2025-07-18

## 2025-07-18 VITALS
DIASTOLIC BLOOD PRESSURE: 57 MMHG | SYSTOLIC BLOOD PRESSURE: 111 MMHG | HEART RATE: 64 BPM | TEMPERATURE: 98 F | RESPIRATION RATE: 18 BRPM | OXYGEN SATURATION: 97 %

## 2025-07-18 LAB
FAT STL QN: NORMAL — SIGNIFICANT CHANGE UP
FAT STL QN: NORMAL — SIGNIFICANT CHANGE UP
SURGICAL PATHOLOGY STUDY: SIGNIFICANT CHANGE UP
SURGICAL PATHOLOGY STUDY: SIGNIFICANT CHANGE UP

## 2025-07-18 PROCEDURE — 99233 SBSQ HOSP IP/OBS HIGH 50: CPT

## 2025-07-18 PROCEDURE — 99239 HOSP IP/OBS DSCHRG MGMT >30: CPT

## 2025-07-18 RX ORDER — LOPERAMIDE HCL 1 MG/7.5ML
2 SOLUTION ORAL
Refills: 0 | Status: DISCONTINUED | OUTPATIENT
Start: 2025-07-18 | End: 2025-07-18

## 2025-07-18 RX ORDER — DIPHENHYDRAMINE HCL 12.5MG/5ML
25 ELIXIR ORAL ONCE
Refills: 0 | Status: COMPLETED | OUTPATIENT
Start: 2025-07-18 | End: 2025-07-18

## 2025-07-18 RX ORDER — MAGNESIUM SULFATE 500 MG/ML
2 SYRINGE (ML) INJECTION ONCE
Refills: 0 | Status: COMPLETED | OUTPATIENT
Start: 2025-07-18 | End: 2025-07-18

## 2025-07-18 RX ADMIN — LOPERAMIDE HCL 2 MILLIGRAM(S): 1 SOLUTION ORAL at 09:33

## 2025-07-18 RX ADMIN — Medication 40 MILLIGRAM(S): at 05:01

## 2025-07-18 RX ADMIN — Medication 25 GRAM(S): at 10:18

## 2025-07-18 RX ADMIN — Medication 1 APPLICATION(S): at 05:00

## 2025-07-18 RX ADMIN — LOPERAMIDE HCL 2 MILLIGRAM(S): 1 SOLUTION ORAL at 17:09

## 2025-07-18 NOTE — DISCHARGE NOTE NURSING/CASE MANAGEMENT/SOCIAL WORK - NSDCPEFALRISK_GEN_ALL_CORE
For information on Fall & Injury Prevention, visit: [email or web address] OR  [email or web address] OR  https://www.cdc.gov/steadi/patient.html

## 2025-07-18 NOTE — PROGRESS NOTE ADULT - ASSESSMENT
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 Admitted for further management    #Chronic Diarrhea  -HD stable, appears cachectic  -No SIRS  -4-5 episodes/ day of soft watery stools. No association with lactose intake  - CTAP w/ iv contrast: 1. There is moderate right and mild left hydronephrosis and hydroureter   to the level of the mid pelvis. No ureteral stones are identified. A 2-3   hour delayed CT scan, allowing time for contrast to extend through the   ureters, may be helpful to further delineate the site of obstruction. (No   additional contrast is necessary.)  2. Splenomegaly (16 cm in length).  3. Surgical clips and anastomosis noted in the RLQ.  -No h/o NSAID or PPI use  -Cdiff neg, GI PCR neg  -Fu ESR CRP vit ADEK level, stool cx, Bcx, ova parasites, stool osm (secretory vs osmotic diarrhea), calprotectin, fat  - as outpatient with GI   -s/p IVF LR   - bicarb 17. started on po bicarb 650mg bid. repeat bicarb : 23. po bicarb dc'ed.   - loperamide prn  - GI reconsulted for possible GI bleed, less likely - Follow up TSH, fecal elastase, fecal leukocytes, fecal calprotectin, and fecal fat test.  - Pelvic u/s negative - post hysterectomy  - GI Cs noted: colonoscopy completed 7/16/25  - If diarrhea persists, can consider trial of cholestyramine. Dosage typically starts at 4-8 grams per day (1 packet or scoopful once or twice a day), with maintenance doses of 8-16 grams per day.   - Can also consider Imodium for symptomatic control of her diarrhea; 4mg (2 capsules), followed by 2mg (one capsule) after each unformed stool, not exceeding 16mg per day.   - Continue to monitor Hb  - Transfuse PRN to keep Hb > 7  - Patient will need to follow up with GI outpatient to repeat her colonoscopy in 6 months, if aligns with her GOC.     #KHANH on CKD II(likely prerenal)- resolved  #Hypomagnesemia-repleted  -Cr downtrending     #Dysuria  #pyuria  #Hematuria, resolved  #Neurogenic bladder  #Chronic b/l hydro  - RN reports hematuria 7/11; resolved. H/H stable at 11, was previously 10.6  -UA positive for WBC and LE   - UCX neg  - BCX NGTD  -CTAP: B/l hydro, no stone  -Fu repeat CT for delayed contrast clearance  -Seen by urologist Dr. bonilla in [facility] who recomended bee  -Pt refusing bee in ED  -s/p aztreonam in ED, pt states she got a rash to ampicillin.  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 Montgomery OP   - not on any treatments per HIE     #Supportive Management:  Dispo:  Home vs SNF 24 - 48hrs  DVT Ppx: heparin   Injectable 5000 Unit(s) SubCutaneous every 8 hours  GI Ppx: PPI  Diet - discharge today

## 2025-07-18 NOTE — PROGRESS NOTE ADULT - PROVIDER SPECIALTY LIST ADULT
Gastroenterology
Gastroenterology
Hospitalist
Infectious Disease
Internal Medicine
Gastroenterology
Hospitalist
Hospitalist
Internal Medicine
Internal Medicine
Gastroenterology
Internal Medicine
Internal Medicine

## 2025-07-18 NOTE — ADVANCED PRACTICE NURSE CONSULT - RECOMMEDATIONS
Cleanse wound with soap and water, pat dry.  Apply Nystatin cream twice daily PRN for soiling - patient refuses barrier cream states its moody her and prefers pink top.  Skin and incontinence care.  Pressure Injury Prevention.  Assess wound daily and inform primary provider of any changes.   Case discussed with primary RN.  Wound/ ostomy specialist to f/u as needed.   Other recommendations per Primary Team.

## 2025-07-18 NOTE — PROGRESS NOTE ADULT - ATTENDING COMMENTS
I edited the note. Patient seen and examined during the GI rounds, case discussed with the GI team (Fellow / Resident / PA), plan communicated to the primary team, assessment, recommendations and plan as above.     Time-based billing (NON-critical care).   50 minutes spent on total encounter which excludes teaching time and/or separately reported services; more than 50% of the visit was spent counseling and / or coordinating care by the attending physician.  The necessity of the time spent during the encounter on this date of service was due to: Coordination of care.
Medicine Attending Attestation  Patient was seen and examined with medicine team.  Nursing records reviewed. I agree with the resident/PA/NP's note including past medical history, home medications, social history, allergies, surgical history, family history, and review of system. I have reviewed relevant vitals, laboratory values, imaging studies, and microbiology.   - Above resident's note was edited by me  - No acute complaints  - Had blood on the floor yesterday; was not clear if it was Gu vs GI vs GYN in origin. H/H stable at 11. On PPI  - Pelvic US with Post hysterectomy.  - GI consulted for possible colonoscopy  - dispo planning once clear by GI  - rest of A/P as per detailed housestaff note above except above modifications    Total time spent to complete patient's bedside assessment, reviewed medical chart, discussed medical plan of care with team was 45 minutes with >50% of time spent face to face with patient, discussion with patient/family, and/or coordination of care, which exclude teaching time and/or separately reported services
Medicine Attending Attestation  Patient was seen and examined with medicine team.  Nursing records reviewed. I agree with the resident/PA/NP's note including past medical history, home medications, social history, allergies, surgical history, family history, and review of system. I have reviewed relevant vitals, laboratory values, imaging studies, and microbiology.   - Above resident's note was edited by me  - rest of A/P as per detailed housestaff note above except above modifications    Total time spent to complete patient's bedside assessment, reviewed medical chart, discussed medical plan of care with team was 45 minutes with >50% of time spent face to face with patient, discussion with patient/family, and/or coordination of care, which exclude teaching time and/or separately reported services
She feels better today, no diarrhea  s/p colonoscopy yesterday, showed multiple polyps  advance diet, PT, if diarrhea again, try Cholestyramine or Imodium, possible discharge in 24hrs to [facility]
I edited the note. Patient seen and examined during the GI rounds, case discussed with the GI team (Fellow / Resident / PA), plan communicated to the primary team, assessment, recommendations and plan as above.     Time-based billing (NON-critical care).   50 minutes spent on total encounter which excludes teaching time and/or separately reported services; more than 50% of the visit was spent counseling and / or coordinating care by the attending physician.  The necessity of the time spent during the encounter on this date of service was due to: Coordination of care.

## 2025-07-18 NOTE — ADVANCED PRACTICE NURSE CONSULT - ASSESSMENT
History of Present Illness:   85-year-old female with PMH cervical cancer (s/p treatment 10 years ago), neurogenic bladder, arthritis, suspected CLL (follows w/ Odaimi)  presents to the ED for evaluation of intermittent diarrhea since January.  Patient states she has up to 5 episodes of loose stools per day, few episodes that wake her up at night, episodes also occur while shes fasting. Diarrhea is non bloodly and consists of soft stool, no mucus. Not associated with bloating or cramping abdominal pain. Patient also reports she has been having frequent UTIs over the past few months, and currently reports dysuria.  Denies fevers, chills, chest pain, shortness of breath, nausea, vomiting, or hematuria      Allergies:  	ampicillin: Drug, Rash  	levofloxacin: Drug, Rash    Patient received in bed, limited mobility, high risk for pressure injury development or progression.    Wound – Sacral friction wounds entered in error – inappropriate documentation   Type & Location: Bilateral gluteal folds, posterior thigh and perineum fungal rash  Tissue Description: beefy red tissue with maculopapular rash  Wound Exudate: none   Wound Edge: intact   Periwound Condition: intact    Patient states she has burning and itching to area.

## 2025-07-18 NOTE — DISCHARGE NOTE NURSING/CASE MANAGEMENT/SOCIAL WORK - FINANCIAL ASSISTANCE
[facility] provides services at a reduced cost to those who are determined to be eligible through [facility]’s financial assistance program. Information regarding [facility]’s financial assistance program can be found by going to [email or web address] or by calling [phone].

## 2025-07-18 NOTE — PROGRESS NOTE ADULT - SUBJECTIVE AND OBJECTIVE BOX
FELICE LOMBARDI 85y Female  MRN#: [id number]     Hospital Day: 10d    Pt is currently admitted with the primary diagnosis of  Urinary tract infection        SUBJECTIVE     Subjective complaints  Pt was evaluated this am. Patient has no complaints    Review of systems  ROS is negative                                            ----------------------------------------------------------    HOSPITAL COURSE      7/14 - Patient was seen and examined at bedside in the morning. no bloody bowel movements overnight. bleeding most likely hematuria  7/15 - Patient was seen and examined at bedside in the morning. Is now amendable for colonoscopy, will go tomorrow following completion of bowel prep  716 - Patient was seen and examined at bedside in the morning. Pt will go for colonoscopy today after the family convinced her, will dc tomorrow.   7/17 - Patient was seen and examined at bedside in the morning. Anticipating dc soon after PT, but patient is refusing PT, [place] completed yesterday  7/18 - Patient was seen and examined at bedside in the morning. Pt stable for d/c , can f/u outpatient with GI                                              ----------------------------------------------------------     OBJECTIVE  PAST MEDICAL & SURGICAL HISTORY  Arthritis    Cervical cancer    No significant past surgical history                                              -----------------------------------------------------------  ALLERGIES:  ampicillin (Rash)  levofloxacin (Rash)                                            ------------------------------------------------------------    HOME MEDICATIONS  Home Medications:  Gemtesa 75 mg oral tablet: 1 tab(s) orally once a day (10 Jul 2025 14:59)                           MEDICATIONS:  STANDING MEDICATIONS  chlorhexidine 2% Cloths 1 Application(s) Topical <User Schedule>  loperamide 2 milliGRAM(s) Oral four times a day  pantoprazole    Tablet 40 milliGRAM(s) Oral before breakfast  tamsulosin 0.4 milliGRAM(s) Oral at bedtime    PRN MEDICATIONS                                            ------------------------------------------------------------  VITAL SIGNS: Last 24 Hours  T(C): 36.7 (18 Jul 2025 01:31), Max: 36.7 (17 Jul 2025 16:37)  T(F): 98 (18 Jul 2025 01:31), Max: 98.1 (17 Jul 2025 16:37)  HR: 76 (18 Jul 2025 01:31) (65 - 76)  BP: 118/62 (18 Jul 2025 01:31) (118/62 - 122/66)  BP(mean): --  RR: 18 (18 Jul 2025 01:31) (18 - 18)  SpO2: 97% (18 Jul 2025 01:31) (97% - 97%)                                             --------------------------------------------------------------  LABS:                                                                    -------------------------------------------------------------  RADIOLOGY, TELEMETRY:                                            --------------------------------------------------------------    PHYSICAL EXAM:  GENERAL: NAD, lying in bed comfortably  HEAD:  Atraumatic, Normocephalic  CHEST/LUNG: Clear to auscultation bilaterally; No rales, rhonchi, wheezing, or rubs. Unlabored respirations  HEART: regular rate and rhythm; No murmurs, rubs, or gallops  ABDOMEN: Bowel sounds present; Soft, Nontender, Nondistended.    EXTREMITIES: Warm. No clubbing, cyanosis, or edema  NERVOUS SYSTEM:  Alert & Oriented X2-3. No focal deficits                                            --------------------------------------------------------------

## 2025-07-18 NOTE — PROGRESS NOTE ADULT - SUBJECTIVE AND OBJECTIVE BOX
Chief Complaint:  Patient is a 85y old  Female who presents with a chief complaint of Diarrhea (11 Jul 2025 10:07)    HPI/ 24 hr events:   patient is s/p colonoscopy and EGD on 7/16. Patient is tolerating solid food. Patient had brown formed stool since last seen yesterday. Had one dose of Imodium. Denies nausea, vomiting, abdominal pain, hematemesis. Vitals are overall stable.       REVIEW OF SYSTEMS:   Negative except for HPI    MEDICATIONS:   MEDICATIONS  (STANDING):  chlorhexidine 2% Cloths 1 Application(s) Topical <User Schedule>  pantoprazole  Injectable 40 milliGRAM(s) IV Push two times a day  tamsulosin 0.4 milliGRAM(s) Oral at bedtime    MEDICATIONS  (PRN):  haloperidol    Injectable 5 milliGRAM(s) IntraMuscular once PRN agitation  loperamide 2 milliGRAM(s) Oral four times a day PRN Diarrhea    DIET:  Diet, Regular (07-09-25 @ 01:02) [Active]      ALLERGIES:   Allergies    ampicillin (Rash)  levofloxacin (Rash)    Intolerances    VITAL SIGNS:   Vital Signs Last 24 Hrs  T(C): 36.7 (14 Jul 2025 08:00), Max: 37.1 (14 Jul 2025 00:00)  T(F): 98 (14 Jul 2025 08:00), Max: 98.8 (14 Jul 2025 00:00)  HR: 80 (14 Jul 2025 08:00) (69 - 80)  BP: 130/54 (14 Jul 2025 08:00) (107/66 - 130/54)  BP(mean): --  RR: 18 (14 Jul 2025 08:00) (18 - 18)  SpO2: 99% (14 Jul 2025 08:00) (99% - 99%)    Parameters below as of 14 Jul 2025 08:00  Patient On (Oxygen Delivery Method): room air      I&O's Summary      PHYSICAL EXAM:   GENERAL:  No acute distress  HEENT:  NC/AT, conjunctiva clear, sclera anicteric  CHEST:  No increased effort  HEART:  Regular rate  ABDOMEN:  Soft, non-tender, non-distended, normoactive bowel sounds, no rebound or guarding  EXTREMITIES: No edema  SKIN:  Warm, dry  NEURO:  Calm, cooperative    LABS:                        11.0   41.37 )-----------( 116      ( 13 Jul 2025 11:47 )             35.3     Hemoglobin: 11.0 g/dL (07-13-25 @ 11:47)  Hemoglobin: 11.0 g/dL (07-11-25 @ 19:45)    07-13    142  |  109  |  19  ----------------------------<  78  3.3[L]   |  21  |  1.0    Ca    8.9      13 Jul 2025 11:47      PTT - ( 13 Jul 2025 11:47 )  PTT:31.3 sec    Culture - Acid Fast - Stool w/Smear (collected 11 Jul 2025 10:05)  Source: Stool Stool  Preliminary Report (12 Jul 2025 23:07):    Culture is being performed.    GI PCR Panel: Bertha (07-11-25 @ 12:47)

## 2025-07-18 NOTE — DISCHARGE NOTE NURSING/CASE MANAGEMENT/SOCIAL WORK - PATIENT PORTAL LINK FT
You can access the FollowMyHealth Patient Portal offered by [facility] by registering at the following website: [email or web address]. By joining [email or web address]’s FollowMyHealth portal, you will also be able to view your health information using other applications (apps) compatible with our system.

## 2025-07-18 NOTE — PROGRESS NOTE ADULT - ASSESSMENT
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 GI was initially consulted for chronic diarrhea and evaluation colonoscopy to rule out collagenous colitis. Initial plan was for outpatient colonoscopy however GI is reconsulted for GI bleed and evaluation for colonoscopy.     #Chronic watery diarrhea, improving   #Concern of GIB, resolved  #History of cholecystectomy  #Hemicolectomy? Surgical clips and bowel anastomosis in RLQ on CT abdomen, patient unable to recall why.  #Mild Pancreatic atrophy on CT abdomen  #Normocytic anemia, multifactorial in the setting of BENJAMÍN and CLL.   - Differentials for patient's chronic diarrhea include postcholecystectomy bile acid diarrhea, post hemicolectomy diarrhea, chronic pancreatic insufficiency. collagenous colitis, microscopic colitis, SIBO. Fecal incontinence could be secondary to pelvic floor problems s/p hysterectomy. No infectious etiology identified. BRBPR is likely secondary to hemorrhoids in nature after colonoscopy ruled out lesions, diverticulosis, AVM, dieufaloy lesions.   - No hx of colonoscopy per patient piror this admission  - Patient refused HEATH.  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 Patient follows with Dr. Montgomery.   - Patient had episode of bleed which is unconfirmed if it is GI or vaginal.   - HD stable  - Hb stable at 11, hct 35.3  - lactoferrin < 1; calprotectin 26 (n).   - Pancreatic elastase 784 (wnl)  - TSH and ft4 normal.   - EGD 2 years ago per patient for heart burn which was unremarkable.   - Leukocytosis improving 30.5k (lymphocyte predominant), Hb 10.7 (baseline 11), creat 1.2 (baseline 0.9), UA positive.   - CTAP showed surgical clips and anastomosis in RLQ, moderate right and mild left hydronephrosis and hydroureter to the level of the mid pelvis, splenomegaly up to 16 cm in length, mild pancreatic atrophy.   - C diff is negative. RVP negative. Stool culture negative so far. GI PCR negative. Anti-TTG IgA negative.  - Pelvic US: Unremarkable. Post hysterectomy.   - 7/16 EGD Impressions:  	Normal mucosa in the whole esophagus.  	Erythema in the stomach compatible with non-erosive gastritis. (Biopsy).  	Normal mucosa in the whole examined duodenum. (Biopsy).  - 7/16: Colonoscopy Impressions:  	Previous Surgery in the anastomosis.  	Polyp (7 mm) in the rectum. (Polypectomy).  	Polyp (8 mm) in the sigmoid colon. (Polypectomy).  	Polyp (10 mm) in the colon. (Polypectomy).  	The colon and TI were otherwise unremarkable (Biopsy).      RECOMMENDATIONS:  - If diarrhea persists, can consider trial of cholestyramine. Dosage typically starts at 4-8 grams per day (1 packet or scoopful once or twice a day), with maintenance doses of 8-16 grams per day.   - Can also consider Imodium for symptomatic control of her diarrhea; 4mg (2 capsules), followed by 2mg (one capsule) after each unformed stool, not exceeding 16mg per day.   - Continue to monitor Hb  - Transfuse PRN to keep Hb > 7  - Patient will need to follow up with GI outpatient to repeat her colonoscopy in 6 months, if aligns with her GOC.     GI will stop actively following this patient. Please do not hesitate to call us back with any questions.     Case discussed with Dr. Turcios.     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 GI was initially consulted for chronic diarrhea and evaluation colonoscopy to rule out collagenous colitis. Initial plan was for outpatient colonoscopy however GI is reconsulted for GI bleed and evaluation for colonoscopy.     #Chronic watery diarrhea, improving   #Concern of GIB, resolved  #History of cholecystectomy  #Hemicolectomy? Surgical clips and bowel anastomosis in RLQ on CT abdomen, patient unable to recall why.  #Mild Pancreatic atrophy on CT abdomen  #Normocytic anemia, multifactorial in the setting of BENJAMÍN and CLL.   - Differentials for patient's chronic diarrhea include postcholecystectomy bile acid diarrhea, post hemicolectomy diarrhea, chronic pancreatic insufficiency. collagenous colitis, microscopic colitis, SIBO. Fecal incontinence could be secondary to pelvic floor problems s/p hysterectomy. No infectious etiology identified. BRBPR is likely secondary to hemorrhoids in nature after colonoscopy ruled out lesions, diverticulosis, AVM, dieufaloy lesions.   - No hx of colonoscopy per patient piror this admission  - Patient refused HEATH.  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 Patient follows with Dr. Montgomery.   - Patient had episode of bleed which is unconfirmed if it is GI or vaginal.   - HD stable  - Hb stable at 11, hct 35.3  - lactoferrin < 1; calprotectin 26 (n).   - Pancreatic elastase 784 (wnl)  - TSH and ft4 normal.   - EGD 2 years ago per patient for heart burn which was unremarkable.   - Leukocytosis improving 30.5k (lymphocyte predominant), Hb 10.7 (baseline 11), creat 1.2 (baseline 0.9), UA positive.   - CTAP showed surgical clips and anastomosis in RLQ, moderate right and mild left hydronephrosis and hydroureter to the level of the mid pelvis, splenomegaly up to 16 cm in length, mild pancreatic atrophy.   - C diff is negative. RVP negative. Stool culture negative so far. GI PCR negative. Anti-TTG IgA negative.  - Pelvic US: Unremarkable. Post hysterectomy.   - 7/16 EGD Impressions:  	Normal mucosa in the whole esophagus.  	Erythema in the stomach compatible with non-erosive gastritis. (Biopsy).  	Normal mucosa in the whole examined duodenum. (Biopsy).  - 7/16: Colonoscopy Impressions:  	Previous Surgery in the anastomosis.  	Polyp (7 mm) in the rectum. (Polypectomy).  	Polyp (8 mm) in the sigmoid colon. (Polypectomy).  	Polyp (10 mm) in the colon. (Polypectomy).  	The colon and TI were otherwise unremarkable (Biopsy).      RECOMMENDATIONS:  - Await pathology results  - If diarrhea persists, can consider trial of cholestyramine. Dosage typically starts at 2-4 grams per day (1 packet or scoopful once or twice a day) to be taken 1-2 hrs after, or 4-6 hrs before other meds  - Can also consider Imodium for symptomatic control of her diarrhea; 4mg (2 capsules), followed by 2mg (one capsule) after each unformed stool, not exceeding 16mg per day  - Continue to monitor Hb  - Transfuse PRN to keep Hb > 7  - Patient will need to follow up with GI outpatient to repeat her colonoscopy in 6 months, if aligns with her [facility]    GI will stop actively following this patient. Please do not hesitate to call us back with any questions.     Case discussed with Dr. Turcios.     Alexandru Gray MD  PGY-4 Gastroenterology Fellow  [facility]

## 2025-07-22 NOTE — CHART NOTE - NSCHARTNOTEFT_GEN_A_CORE
[facility] MRN [id number] Uro & gastro / Left message with sister 7/21 & 7/22 - IRENA    SPECIALTY: urology

## 2025-07-22 NOTE — CHART NOTE - NSCHARTNOTESELECT_GEN_ALL_CORE
attestation/Event Note
Event Note
Event Note
Gastroenterology Update Note
Transfer Note
non-clinical appt info/Event Note

## 2025-07-24 DIAGNOSIS — Z91.199 PATIENT'S NONCOMPLIANCE WITH OTHER MEDICAL TREATMENT AND REGIMEN DUE TO UNSPECIFIED REASON: ICD-10-CM

## 2025-07-24 DIAGNOSIS — Z85.41 PERSONAL HISTORY OF MALIGNANT NEOPLASM OF CERVIX UTERI: ICD-10-CM

## 2025-07-24 DIAGNOSIS — N18.2 CHRONIC KIDNEY DISEASE, STAGE 2 (MILD): ICD-10-CM

## 2025-07-24 DIAGNOSIS — R31.9 HEMATURIA, UNSPECIFIED: ICD-10-CM

## 2025-07-24 DIAGNOSIS — N17.9 ACUTE KIDNEY FAILURE, UNSPECIFIED: ICD-10-CM

## 2025-07-24 DIAGNOSIS — K62.1 RECTAL POLYP: ICD-10-CM

## 2025-07-24 DIAGNOSIS — K52.9 NONINFECTIVE GASTROENTERITIS AND COLITIS, UNSPECIFIED: ICD-10-CM

## 2025-07-24 DIAGNOSIS — C91.10 CHRONIC LYMPHOCYTIC LEUKEMIA OF B-CELL TYPE NOT HAVING ACHIEVED REMISSION: ICD-10-CM

## 2025-07-24 DIAGNOSIS — M19.90 UNSPECIFIED OSTEOARTHRITIS, UNSPECIFIED SITE: ICD-10-CM

## 2025-07-24 DIAGNOSIS — N13.4 HYDROURETER: ICD-10-CM

## 2025-07-24 DIAGNOSIS — D69.6 THROMBOCYTOPENIA, UNSPECIFIED: ICD-10-CM

## 2025-07-24 DIAGNOSIS — N13.6 PYONEPHROSIS: ICD-10-CM

## 2025-07-24 DIAGNOSIS — R63.4 ABNORMAL WEIGHT LOSS: ICD-10-CM

## 2025-07-24 DIAGNOSIS — N31.9 NEUROMUSCULAR DYSFUNCTION OF BLADDER, UNSPECIFIED: ICD-10-CM

## 2025-07-24 DIAGNOSIS — Z88.1 ALLERGY STATUS TO OTHER ANTIBIOTIC AGENTS: ICD-10-CM

## 2025-07-24 DIAGNOSIS — R16.1 SPLENOMEGALY, NOT ELSEWHERE CLASSIFIED: ICD-10-CM

## 2025-07-24 DIAGNOSIS — D64.9 ANEMIA, UNSPECIFIED: ICD-10-CM

## 2025-07-24 DIAGNOSIS — K63.5 POLYP OF COLON: ICD-10-CM

## 2025-07-24 DIAGNOSIS — E83.42 HYPOMAGNESEMIA: ICD-10-CM

## 2025-08-04 NOTE — PHYSICAL THERAPY INITIAL EVALUATION ADULT - ADL SKILLS, REHAB EVAL
Problem: Adult Inpatient Plan of Care  Goal: Plan of Care Review  Outcome: Progressing  Goal: Patient-Specific Goal (Individualized)  Outcome: Progressing  Goal: Absence of Hospital-Acquired Illness or Injury  Outcome: Progressing  Goal: Optimal Comfort and Wellbeing  Outcome: Progressing  Goal: Readiness for Transition of Care  Outcome: Progressing     Problem: Sepsis/Septic Shock  Goal: Optimal Coping  Outcome: Progressing  Goal: Absence of Bleeding  Outcome: Progressing  Goal: Blood Glucose Level Within Targeted Range  Outcome: Progressing  Goal: Absence of Infection Signs and Symptoms  Outcome: Progressing  Goal: Optimal Nutrition Intake  Outcome: Progressing     Problem: Diabetes Comorbidity  Goal: Blood Glucose Level Within Targeted Range  Outcome: Progressing     Problem: Wound  Goal: Optimal Coping  Outcome: Progressing  Goal: Optimal Functional Ability  Outcome: Progressing  Goal: Absence of Infection Signs and Symptoms  Outcome: Progressing  Goal: Improved Oral Intake  Outcome: Progressing  Goal: Optimal Pain Control and Function  Outcome: Progressing  Goal: Skin Health and Integrity  Outcome: Progressing  Goal: Optimal Wound Healing  Outcome: Progressing     Problem: Fall Injury Risk  Goal: Absence of Fall and Fall-Related Injury  Outcome: Progressing      RW/needs device

## 2025-08-11 ENCOUNTER — OUTPATIENT (OUTPATIENT)
Dept: OUTPATIENT SERVICES | Facility: HOSPITAL | Age: 85
LOS: 1 days | End: 2025-08-11
Payer: MEDICARE

## 2025-08-11 ENCOUNTER — APPOINTMENT (OUTPATIENT)
Age: 85
End: 2025-08-11
Payer: MEDICARE

## 2025-08-11 VITALS
WEIGHT: 116 LBS | OXYGEN SATURATION: 95 % | HEART RATE: 68 BPM | TEMPERATURE: 98.1 F | SYSTOLIC BLOOD PRESSURE: 112 MMHG | DIASTOLIC BLOOD PRESSURE: 62 MMHG

## 2025-08-11 DIAGNOSIS — R32 UNSPECIFIED URINARY INCONTINENCE: ICD-10-CM

## 2025-08-11 DIAGNOSIS — Z00.00 ENCOUNTER FOR GENERAL ADULT MEDICAL EXAMINATION WITHOUT ABNORMAL FINDINGS: ICD-10-CM

## 2025-08-11 DIAGNOSIS — N13.30 UNSPECIFIED HYDRONEPHROSIS: ICD-10-CM

## 2025-08-11 DIAGNOSIS — R31.0 GROSS HEMATURIA: ICD-10-CM

## 2025-08-11 DIAGNOSIS — N39.0 URINARY TRACT INFECTION, SITE NOT SPECIFIED: ICD-10-CM

## 2025-08-11 DIAGNOSIS — R30.0 DYSURIA: ICD-10-CM

## 2025-08-11 PROCEDURE — G2211 COMPLEX E/M VISIT ADD ON: CPT

## 2025-08-11 PROCEDURE — 99204 OFFICE O/P NEW MOD 45 MIN: CPT

## 2025-08-11 RX ORDER — SULFAMETHOXAZOLE AND TRIMETHOPRIM 800; 160 MG/1; MG/1
800-160 TABLET ORAL
Qty: 6 | Refills: 0 | Status: ACTIVE | COMMUNITY
Start: 2025-08-11 | End: 1900-01-01

## 2025-08-14 DIAGNOSIS — R31.0 GROSS HEMATURIA: ICD-10-CM

## 2025-08-14 DIAGNOSIS — N13.30 UNSPECIFIED HYDRONEPHROSIS: ICD-10-CM

## 2025-08-14 DIAGNOSIS — R30.0 DYSURIA: ICD-10-CM

## 2025-08-14 DIAGNOSIS — N39.0 URINARY TRACT INFECTION, SITE NOT SPECIFIED: ICD-10-CM

## 2025-08-14 DIAGNOSIS — R32 UNSPECIFIED URINARY INCONTINENCE: ICD-10-CM

## 2025-09-05 ENCOUNTER — APPOINTMENT (OUTPATIENT)
Age: 85
End: 2025-09-05

## 2025-09-08 ENCOUNTER — APPOINTMENT (OUTPATIENT)
Age: 85
End: 2025-09-08
Payer: MEDICARE

## 2025-09-08 VITALS
HEART RATE: 55 BPM | WEIGHT: 116 LBS | TEMPERATURE: 97.3 F | OXYGEN SATURATION: 98 % | SYSTOLIC BLOOD PRESSURE: 92 MMHG | DIASTOLIC BLOOD PRESSURE: 57 MMHG

## 2025-09-08 DIAGNOSIS — R31.0 GROSS HEMATURIA: ICD-10-CM

## 2025-09-08 DIAGNOSIS — N13.30 UNSPECIFIED HYDRONEPHROSIS: ICD-10-CM

## 2025-09-08 DIAGNOSIS — R30.0 DYSURIA: ICD-10-CM

## 2025-09-08 DIAGNOSIS — N18.9 CHRONIC KIDNEY DISEASE, UNSPECIFIED: ICD-10-CM

## 2025-09-08 PROCEDURE — 99214 OFFICE O/P EST MOD 30 MIN: CPT

## 2025-09-08 PROCEDURE — G2211 COMPLEX E/M VISIT ADD ON: CPT
